# Patient Record
Sex: FEMALE | Race: WHITE | Employment: FULL TIME | URBAN - METROPOLITAN AREA
[De-identification: names, ages, dates, MRNs, and addresses within clinical notes are randomized per-mention and may not be internally consistent; named-entity substitution may affect disease eponyms.]

---

## 2017-02-09 ENCOUNTER — TELEPHONE (OUTPATIENT)
Dept: INTERNAL MEDICINE | Age: 67
End: 2017-02-09

## 2017-02-09 DIAGNOSIS — Z00.00 PHYSICAL EXAM: ICD-10-CM

## 2017-02-09 DIAGNOSIS — I10 ESSENTIAL HYPERTENSION: Primary | ICD-10-CM

## 2017-02-16 LAB
A/G RATIO: 1.8 (ref 1.1–2.2)
ALBUMIN SERPL-MCNC: 4.6 G/DL (ref 3.4–5)
ALP BLD-CCNC: 90 U/L (ref 40–129)
ALT SERPL-CCNC: 16 U/L (ref 10–40)
ANION GAP SERPL CALCULATED.3IONS-SCNC: 15 MMOL/L (ref 3–16)
AST SERPL-CCNC: 15 U/L (ref 15–37)
BASOPHILS ABSOLUTE: 0 K/UL (ref 0–0.2)
BASOPHILS RELATIVE PERCENT: 0.4 %
BILIRUB SERPL-MCNC: 0.4 MG/DL (ref 0–1)
BILIRUBIN URINE: NEGATIVE
BLOOD, URINE: NEGATIVE
BUN BLDV-MCNC: 12 MG/DL (ref 7–20)
CALCIUM SERPL-MCNC: 9.7 MG/DL (ref 8.3–10.6)
CHLORIDE BLD-SCNC: 100 MMOL/L (ref 99–110)
CHOLESTEROL, TOTAL: 174 MG/DL (ref 0–199)
CLARITY: CLEAR
CO2: 27 MMOL/L (ref 21–32)
COLOR: YELLOW
CREAT SERPL-MCNC: 0.6 MG/DL (ref 0.6–1.2)
EOSINOPHILS ABSOLUTE: 0.1 K/UL (ref 0–0.6)
EOSINOPHILS RELATIVE PERCENT: 1.4 %
GFR AFRICAN AMERICAN: >60
GFR NON-AFRICAN AMERICAN: >60
GLOBULIN: 2.6 G/DL
GLUCOSE BLD-MCNC: 89 MG/DL (ref 70–99)
GLUCOSE URINE: NEGATIVE MG/DL
HCT VFR BLD CALC: 44.2 % (ref 36–48)
HDLC SERPL-MCNC: 53 MG/DL (ref 40–60)
HEMOGLOBIN: 14.2 G/DL (ref 12–16)
KETONES, URINE: NEGATIVE MG/DL
LDL CHOLESTEROL CALCULATED: 96 MG/DL
LEUKOCYTE ESTERASE, URINE: NEGATIVE
LYMPHOCYTES ABSOLUTE: 1.7 K/UL (ref 1–5.1)
LYMPHOCYTES RELATIVE PERCENT: 30.2 %
MCH RBC QN AUTO: 28.9 PG (ref 26–34)
MCHC RBC AUTO-ENTMCNC: 32.1 G/DL (ref 31–36)
MCV RBC AUTO: 90.1 FL (ref 80–100)
MICROSCOPIC EXAMINATION: NORMAL
MONOCYTES ABSOLUTE: 0.4 K/UL (ref 0–1.3)
MONOCYTES RELATIVE PERCENT: 7.5 %
NEUTROPHILS ABSOLUTE: 3.5 K/UL (ref 1.7–7.7)
NEUTROPHILS RELATIVE PERCENT: 60.5 %
NITRITE, URINE: NEGATIVE
PDW BLD-RTO: 14.4 % (ref 12.4–15.4)
PH UA: 6.5
PLATELET # BLD: 246 K/UL (ref 135–450)
PMV BLD AUTO: 8.7 FL (ref 5–10.5)
POTASSIUM SERPL-SCNC: 4.4 MMOL/L (ref 3.5–5.1)
PROTEIN UA: NEGATIVE MG/DL
RBC # BLD: 4.9 M/UL (ref 4–5.2)
SODIUM BLD-SCNC: 142 MMOL/L (ref 136–145)
SPECIFIC GRAVITY UA: 1.01
TOTAL PROTEIN: 7.2 G/DL (ref 6.4–8.2)
TRIGL SERPL-MCNC: 124 MG/DL (ref 0–150)
TSH REFLEX FT4: 1.21 UIU/ML (ref 0.27–4.2)
URINE TYPE: NORMAL
UROBILINOGEN, URINE: 0.2 E.U./DL
VITAMIN D 25-HYDROXY: 9.2 NG/ML
VLDLC SERPL CALC-MCNC: 25 MG/DL
WBC # BLD: 5.7 K/UL (ref 4–11)

## 2017-02-20 RX ORDER — VALSARTAN 160 MG/1
TABLET ORAL
Qty: 90 TABLET | Refills: 3 | Status: SHIPPED | OUTPATIENT
Start: 2017-02-20 | End: 2018-02-07 | Stop reason: SDUPTHER

## 2017-02-28 RX ORDER — HYDROCHLOROTHIAZIDE 25 MG/1
TABLET ORAL
Qty: 90 TABLET | Refills: 3 | Status: SHIPPED | OUTPATIENT
Start: 2017-02-28 | End: 2018-02-20 | Stop reason: SDUPTHER

## 2017-03-02 ENCOUNTER — OFFICE VISIT (OUTPATIENT)
Dept: INTERNAL MEDICINE | Age: 67
End: 2017-03-02

## 2017-03-02 VITALS
SYSTOLIC BLOOD PRESSURE: 130 MMHG | WEIGHT: 274 LBS | BODY MASS INDEX: 41.52 KG/M2 | DIASTOLIC BLOOD PRESSURE: 78 MMHG | HEIGHT: 68 IN | HEART RATE: 72 BPM

## 2017-03-02 DIAGNOSIS — R19.5 LOOSE STOOLS: ICD-10-CM

## 2017-03-02 DIAGNOSIS — L98.9 SKIN LESION OF RIGHT LEG: ICD-10-CM

## 2017-03-02 DIAGNOSIS — I10 ESSENTIAL HYPERTENSION: ICD-10-CM

## 2017-03-02 DIAGNOSIS — Z00.00 PREVENTATIVE HEALTH CARE: Primary | ICD-10-CM

## 2017-03-02 DIAGNOSIS — R60.9 EDEMA, UNSPECIFIED TYPE: ICD-10-CM

## 2017-03-02 DIAGNOSIS — Z00.00 PHYSICAL EXAM: ICD-10-CM

## 2017-03-02 DIAGNOSIS — E55.9 VITAMIN D DEFICIENCY: ICD-10-CM

## 2017-03-02 PROCEDURE — 93000 ELECTROCARDIOGRAM COMPLETE: CPT | Performed by: INTERNAL MEDICINE

## 2017-03-02 PROCEDURE — 99397 PER PM REEVAL EST PAT 65+ YR: CPT | Performed by: INTERNAL MEDICINE

## 2017-03-02 ASSESSMENT — ENCOUNTER SYMPTOMS
ALLERGIC/IMMUNOLOGIC NEGATIVE: 1
ANAL BLEEDING: 0
RESPIRATORY NEGATIVE: 1
EYES NEGATIVE: 1
DIARRHEA: 1
BLOOD IN STOOL: 0

## 2017-04-06 ENCOUNTER — OFFICE VISIT (OUTPATIENT)
Dept: DERMATOLOGY | Age: 67
End: 2017-04-06

## 2017-04-06 DIAGNOSIS — D23.71 DERMATOFIBROMA OF LOWER LEG, RIGHT: Primary | ICD-10-CM

## 2017-04-06 PROCEDURE — 99201 PR OFFICE OUTPATIENT NEW 10 MINUTES: CPT | Performed by: DERMATOLOGY

## 2018-01-31 ENCOUNTER — TELEPHONE (OUTPATIENT)
Dept: INTERNAL MEDICINE | Age: 68
End: 2018-01-31

## 2018-01-31 RX ORDER — DIPHENOXYLATE HYDROCHLORIDE AND ATROPINE SULFATE 2.5; .025 MG/1; MG/1
1 TABLET ORAL EVERY 6 HOURS PRN
Qty: 20 TABLET | Refills: 0 | OUTPATIENT
Start: 2018-01-31 | End: 2018-03-02

## 2018-01-31 NOTE — TELEPHONE ENCOUNTER
Patient has been sick with vomiting and diarrhea the vomiting has stop  Pt has taken kaopectate not helping  Please call pt to advise   [facility]

## 2018-02-07 RX ORDER — VALSARTAN 160 MG/1
TABLET ORAL
Qty: 90 TABLET | Refills: 3 | Status: SHIPPED | OUTPATIENT
Start: 2018-02-07 | End: 2018-08-13

## 2018-02-13 ENCOUNTER — TELEPHONE (OUTPATIENT)
Dept: INTERNAL MEDICINE | Age: 68
End: 2018-02-13

## 2018-02-13 RX ORDER — CHOLESTYRAMINE
POWDER (GRAM) MISCELLANEOUS
Qty: 473 G | Refills: 0 | Status: SHIPPED | OUTPATIENT
Start: 2018-02-13 | End: 2018-03-05 | Stop reason: SDUPTHER

## 2018-02-13 NOTE — TELEPHONE ENCOUNTER
Symptoms: diarrhea since yesterday     What medications have you tried:Lomotil  #20   1 q6h rn diarrhea, however pt is currently taking kaopectate diarrhea started yesterday   Stopped diarrhea and d/c taking due to experiencing dizziness and would like to be Rx something else for diarrhea   Pharmacy listed     Appointment offered:yes, request to be Rx something different for new onset of diarrhea   Pt can be reached at [phone]

## 2018-02-19 ENCOUNTER — TELEPHONE (OUTPATIENT)
Dept: INTERNAL MEDICINE | Age: 68
End: 2018-02-19

## 2018-02-19 DIAGNOSIS — R31.29 MICROHEMATURIA: ICD-10-CM

## 2018-02-19 DIAGNOSIS — I10 ESSENTIAL HYPERTENSION: Primary | ICD-10-CM

## 2018-02-19 DIAGNOSIS — Z00.00 PHYSICAL EXAM: ICD-10-CM

## 2018-02-19 DIAGNOSIS — E55.9 VITAMIN D DEFICIENCY: ICD-10-CM

## 2018-02-20 RX ORDER — HYDROCHLOROTHIAZIDE 25 MG/1
TABLET ORAL
Qty: 90 TABLET | Refills: 3 | Status: SHIPPED | OUTPATIENT
Start: 2018-02-20 | End: 2019-01-28 | Stop reason: SDUPTHER

## 2018-02-26 LAB
A/G RATIO: 1.8 (ref 1.1–2.2)
ALBUMIN SERPL-MCNC: 4.5 G/DL (ref 3.4–5)
ALP BLD-CCNC: 81 U/L (ref 40–129)
ALT SERPL-CCNC: 19 U/L (ref 10–40)
ANION GAP SERPL CALCULATED.3IONS-SCNC: 17 MMOL/L (ref 3–16)
AST SERPL-CCNC: 22 U/L (ref 15–37)
BASOPHILS ABSOLUTE: 0 K/UL (ref 0–0.2)
BASOPHILS RELATIVE PERCENT: 0.3 %
BILIRUB SERPL-MCNC: 0.5 MG/DL (ref 0–1)
BILIRUBIN URINE: NEGATIVE
BLOOD, URINE: NEGATIVE
BUN BLDV-MCNC: 8 MG/DL (ref 7–20)
CALCIUM SERPL-MCNC: 9.1 MG/DL (ref 8.3–10.6)
CHLORIDE BLD-SCNC: 95 MMOL/L (ref 99–110)
CHOLESTEROL, TOTAL: 169 MG/DL (ref 0–199)
CLARITY: CLEAR
CO2: 28 MMOL/L (ref 21–32)
COLOR: YELLOW
CREAT SERPL-MCNC: 0.5 MG/DL (ref 0.6–1.2)
EOSINOPHILS ABSOLUTE: 0.1 K/UL (ref 0–0.6)
EOSINOPHILS RELATIVE PERCENT: 1.1 %
EPITHELIAL CELLS, UA: 0 /HPF (ref 0–5)
GFR AFRICAN AMERICAN: >60
GFR NON-AFRICAN AMERICAN: >60
GLOBULIN: 2.5 G/DL
GLUCOSE BLD-MCNC: 96 MG/DL (ref 70–99)
GLUCOSE URINE: NEGATIVE MG/DL
HCT VFR BLD CALC: 42.8 % (ref 36–48)
HDLC SERPL-MCNC: 48 MG/DL (ref 40–60)
HEMOGLOBIN: 14.4 G/DL (ref 12–16)
HYALINE CASTS: 0 /LPF (ref 0–8)
KETONES, URINE: NEGATIVE MG/DL
LDL CHOLESTEROL CALCULATED: 91 MG/DL
LEUKOCYTE ESTERASE, URINE: ABNORMAL
LYMPHOCYTES ABSOLUTE: 1.6 K/UL (ref 1–5.1)
LYMPHOCYTES RELATIVE PERCENT: 27.1 %
MCH RBC QN AUTO: 29.6 PG (ref 26–34)
MCHC RBC AUTO-ENTMCNC: 33.6 G/DL (ref 31–36)
MCV RBC AUTO: 88.2 FL (ref 80–100)
MICROSCOPIC EXAMINATION: YES
MONOCYTES ABSOLUTE: 0.4 K/UL (ref 0–1.3)
MONOCYTES RELATIVE PERCENT: 6.1 %
NEUTROPHILS ABSOLUTE: 3.9 K/UL (ref 1.7–7.7)
NEUTROPHILS RELATIVE PERCENT: 65.4 %
NITRITE, URINE: NEGATIVE
PDW BLD-RTO: 13.9 % (ref 12.4–15.4)
PH UA: 6.5
PLATELET # BLD: 282 K/UL (ref 135–450)
PMV BLD AUTO: 8.4 FL (ref 5–10.5)
POTASSIUM SERPL-SCNC: 3.7 MMOL/L (ref 3.5–5.1)
PROTEIN UA: NEGATIVE MG/DL
RBC # BLD: 4.85 M/UL (ref 4–5.2)
RBC UA: 2 /HPF (ref 0–4)
SODIUM BLD-SCNC: 140 MMOL/L (ref 136–145)
SPECIFIC GRAVITY UA: 1
TOTAL PROTEIN: 7 G/DL (ref 6.4–8.2)
TRIGL SERPL-MCNC: 149 MG/DL (ref 0–150)
TSH REFLEX FT4: 0.88 UIU/ML (ref 0.27–4.2)
URINE TYPE: ABNORMAL
UROBILINOGEN, URINE: 0.2 E.U./DL
VITAMIN D 25-HYDROXY: 5.9 NG/ML
VLDLC SERPL CALC-MCNC: 30 MG/DL
WBC # BLD: 5.9 K/UL (ref 4–11)
WBC UA: 1 /HPF (ref 0–5)

## 2018-03-05 ENCOUNTER — OFFICE VISIT (OUTPATIENT)
Dept: INTERNAL MEDICINE | Age: 68
End: 2018-03-05

## 2018-03-05 VITALS
HEART RATE: 67 BPM | SYSTOLIC BLOOD PRESSURE: 120 MMHG | HEIGHT: 68 IN | WEIGHT: 274 LBS | DIASTOLIC BLOOD PRESSURE: 78 MMHG | OXYGEN SATURATION: 96 % | BODY MASS INDEX: 41.52 KG/M2

## 2018-03-05 DIAGNOSIS — E55.9 VITAMIN D DEFICIENCY: ICD-10-CM

## 2018-03-05 DIAGNOSIS — I10 ESSENTIAL HYPERTENSION: Primary | ICD-10-CM

## 2018-03-05 DIAGNOSIS — R31.29 MICROHEMATURIA: ICD-10-CM

## 2018-03-05 DIAGNOSIS — Z00.00 PREVENTATIVE HEALTH CARE: ICD-10-CM

## 2018-03-05 DIAGNOSIS — R19.5 LOOSE STOOLS: ICD-10-CM

## 2018-03-05 PROCEDURE — 93000 ELECTROCARDIOGRAM COMPLETE: CPT | Performed by: INTERNAL MEDICINE

## 2018-03-05 PROCEDURE — 99397 PER PM REEVAL EST PAT 65+ YR: CPT | Performed by: INTERNAL MEDICINE

## 2018-03-05 RX ORDER — CHOLESTYRAMINE
POWDER (GRAM) MISCELLANEOUS
Qty: 473 G | Refills: 5 | Status: SHIPPED | OUTPATIENT
Start: 2018-03-05 | End: 2018-03-05 | Stop reason: SDUPTHER

## 2018-03-05 RX ORDER — CHOLESTYRAMINE
POWDER (GRAM) MISCELLANEOUS
Qty: 473 G | Refills: 5 | Status: SHIPPED | OUTPATIENT
Start: 2018-03-05 | End: 2019-02-14

## 2018-03-05 ASSESSMENT — PATIENT HEALTH QUESTIONNAIRE - PHQ9
1. LITTLE INTEREST OR PLEASURE IN DOING THINGS: 0
SUM OF ALL RESPONSES TO PHQ9 QUESTIONS 1 & 2: 0
SUM OF ALL RESPONSES TO PHQ QUESTIONS 1-9: 0
2. FEELING DOWN, DEPRESSED OR HOPELESS: 0

## 2018-03-05 ASSESSMENT — ENCOUNTER SYMPTOMS
VOMITING: 0
NAUSEA: 0
BLOOD IN STOOL: 0
EYES NEGATIVE: 1
ABDOMINAL PAIN: 0
RESPIRATORY NEGATIVE: 1
ALLERGIC/IMMUNOLOGIC NEGATIVE: 1
DIARRHEA: 1

## 2018-04-11 PROBLEM — Z00.00 PREVENTATIVE HEALTH CARE: Status: RESOLVED | Noted: 2018-03-05 | Resolved: 2018-04-11

## 2018-08-13 ENCOUNTER — TELEPHONE (OUTPATIENT)
Dept: INTERNAL MEDICINE | Age: 68
End: 2018-08-13

## 2018-08-13 RX ORDER — LOSARTAN POTASSIUM 100 MG/1
100 TABLET ORAL DAILY
Qty: 90 TABLET | Refills: 3 | Status: SHIPPED | OUTPATIENT
Start: 2018-08-13 | End: 2019-07-23 | Stop reason: SDUPTHER

## 2018-09-04 ENCOUNTER — TELEPHONE (OUTPATIENT)
Dept: INTERNAL MEDICINE | Age: 68
End: 2018-09-04

## 2018-09-04 RX ORDER — TOBRAMYCIN AND DEXAMETHASONE 3; 1 MG/ML; MG/ML
SUSPENSION/ DROPS OPHTHALMIC
Qty: 5 ML | Refills: 0 | Status: SHIPPED | OUTPATIENT
Start: 2018-09-04 | End: 2018-11-19 | Stop reason: SDUPTHER

## 2018-11-19 ENCOUNTER — TELEPHONE (OUTPATIENT)
Dept: INTERNAL MEDICINE CLINIC | Age: 68
End: 2018-11-19

## 2018-11-19 RX ORDER — TOBRAMYCIN AND DEXAMETHASONE 3; 1 MG/ML; MG/ML
SUSPENSION/ DROPS OPHTHALMIC
Qty: 5 ML | Refills: 0 | Status: SHIPPED | OUTPATIENT
Start: 2018-11-19 | End: 2019-02-14

## 2018-11-19 NOTE — TELEPHONE ENCOUNTER
I refilled the last one I could find. If not improving within a couple of days she should see us or her eye doctor.

## 2019-01-07 ENCOUNTER — TELEPHONE (OUTPATIENT)
Dept: INTERNAL MEDICINE CLINIC | Age: 69
End: 2019-01-07

## 2019-01-28 RX ORDER — HYDROCHLOROTHIAZIDE 25 MG/1
TABLET ORAL
Qty: 90 TABLET | Refills: 3 | Status: SHIPPED | OUTPATIENT
Start: 2019-01-28 | End: 2019-03-06

## 2019-01-31 RX ORDER — CHOLESTYRAMINE 4 G/9G
POWDER, FOR SUSPENSION ORAL
Qty: 378 G | Refills: 1 | Status: SHIPPED | OUTPATIENT
Start: 2019-01-31 | End: 2019-02-18 | Stop reason: SDUPTHER

## 2019-02-14 ENCOUNTER — TELEPHONE (OUTPATIENT)
Dept: INTERNAL MEDICINE CLINIC | Age: 69
End: 2019-02-14

## 2019-02-14 DIAGNOSIS — I10 ESSENTIAL HYPERTENSION: ICD-10-CM

## 2019-02-14 DIAGNOSIS — E55.9 VITAMIN D DEFICIENCY: ICD-10-CM

## 2019-02-14 DIAGNOSIS — R31.29 MICROHEMATURIA: ICD-10-CM

## 2019-02-14 DIAGNOSIS — Z00.00 WELL ADULT EXAM: Primary | ICD-10-CM

## 2019-02-18 RX ORDER — CHOLESTYRAMINE 4 G/9G
POWDER, FOR SUSPENSION ORAL
Qty: 1134 G | Refills: 1 | Status: SHIPPED | OUTPATIENT
Start: 2019-02-18 | End: 2019-09-03 | Stop reason: SDUPTHER

## 2019-02-26 LAB
A/G RATIO: 2 (ref 1.1–2.2)
ALBUMIN SERPL-MCNC: 4.7 G/DL (ref 3.4–5)
ALP BLD-CCNC: 96 U/L (ref 40–129)
ALT SERPL-CCNC: 16 U/L (ref 10–40)
ANION GAP SERPL CALCULATED.3IONS-SCNC: 16 MMOL/L (ref 3–16)
AST SERPL-CCNC: 15 U/L (ref 15–37)
BASOPHILS ABSOLUTE: 0 K/UL (ref 0–0.2)
BASOPHILS RELATIVE PERCENT: 0.3 %
BILIRUB SERPL-MCNC: 0.5 MG/DL (ref 0–1)
BUN BLDV-MCNC: 8 MG/DL (ref 7–20)
CALCIUM SERPL-MCNC: 9.3 MG/DL (ref 8.3–10.6)
CHLORIDE BLD-SCNC: 99 MMOL/L (ref 99–110)
CHOLESTEROL, TOTAL: 159 MG/DL (ref 0–199)
CO2: 28 MMOL/L (ref 21–32)
CREAT SERPL-MCNC: 0.6 MG/DL (ref 0.6–1.2)
EOSINOPHILS ABSOLUTE: 0 K/UL (ref 0–0.6)
EOSINOPHILS RELATIVE PERCENT: 1 %
GFR AFRICAN AMERICAN: >60
GFR NON-AFRICAN AMERICAN: >60
GLOBULIN: 2.3 G/DL
GLUCOSE BLD-MCNC: 97 MG/DL (ref 70–99)
HCT VFR BLD CALC: 44.1 % (ref 36–48)
HDLC SERPL-MCNC: 45 MG/DL (ref 40–60)
HEMOGLOBIN: 14.3 G/DL (ref 12–16)
HEPATITIS C ANTIBODY INTERPRETATION: NORMAL
LDL CHOLESTEROL CALCULATED: 90 MG/DL
LYMPHOCYTES ABSOLUTE: 1.3 K/UL (ref 1–5.1)
LYMPHOCYTES RELATIVE PERCENT: 27 %
MCH RBC QN AUTO: 28.7 PG (ref 26–34)
MCHC RBC AUTO-ENTMCNC: 32.4 G/DL (ref 31–36)
MCV RBC AUTO: 88.6 FL (ref 80–100)
MONOCYTES ABSOLUTE: 0.3 K/UL (ref 0–1.3)
MONOCYTES RELATIVE PERCENT: 7 %
NEUTROPHILS ABSOLUTE: 3 K/UL (ref 1.7–7.7)
NEUTROPHILS RELATIVE PERCENT: 64.7 %
PDW BLD-RTO: 14.4 % (ref 12.4–15.4)
PLATELET # BLD: 274 K/UL (ref 135–450)
PMV BLD AUTO: 8.1 FL (ref 5–10.5)
POTASSIUM SERPL-SCNC: 3.9 MMOL/L (ref 3.5–5.1)
RBC # BLD: 4.98 M/UL (ref 4–5.2)
SODIUM BLD-SCNC: 143 MMOL/L (ref 136–145)
TOTAL PROTEIN: 7 G/DL (ref 6.4–8.2)
TRIGL SERPL-MCNC: 119 MG/DL (ref 0–150)
TSH SERPL DL<=0.05 MIU/L-ACNC: 1.03 UIU/ML (ref 0.27–4.2)
VITAMIN D 25-HYDROXY: <5 NG/ML
VLDLC SERPL CALC-MCNC: 24 MG/DL
WBC # BLD: 4.7 K/UL (ref 4–11)

## 2019-02-27 LAB
ESTIMATED AVERAGE GLUCOSE: 119.8 MG/DL
HBA1C MFR BLD: 5.8 %

## 2019-03-06 ENCOUNTER — HOSPITAL ENCOUNTER (OUTPATIENT)
Age: 69
End: 2019-03-06
Payer: COMMERCIAL

## 2019-03-06 ENCOUNTER — OFFICE VISIT (OUTPATIENT)
Dept: INTERNAL MEDICINE CLINIC | Age: 69
End: 2019-03-06
Payer: COMMERCIAL

## 2019-03-06 ENCOUNTER — HOSPITAL ENCOUNTER (OUTPATIENT)
Dept: GENERAL RADIOLOGY | Age: 69
Discharge: HOME | End: 2019-03-06
Payer: COMMERCIAL

## 2019-03-06 VITALS
BODY MASS INDEX: 41.98 KG/M2 | HEIGHT: 68 IN | SYSTOLIC BLOOD PRESSURE: 130 MMHG | HEART RATE: 72 BPM | TEMPERATURE: 97.8 F | OXYGEN SATURATION: 96 % | DIASTOLIC BLOOD PRESSURE: 80 MMHG | WEIGHT: 277 LBS

## 2019-03-06 DIAGNOSIS — M79.675 GREAT TOE PAIN, LEFT: ICD-10-CM

## 2019-03-06 DIAGNOSIS — I10 ESSENTIAL HYPERTENSION: ICD-10-CM

## 2019-03-06 DIAGNOSIS — E55.9 VITAMIN D DEFICIENCY: ICD-10-CM

## 2019-03-06 DIAGNOSIS — Z23 NEED FOR PNEUMOCOCCAL VACCINATION: ICD-10-CM

## 2019-03-06 DIAGNOSIS — Z00.00 PREVENTATIVE HEALTH CARE: Primary | ICD-10-CM

## 2019-03-06 DIAGNOSIS — R60.9 EDEMA, UNSPECIFIED TYPE: ICD-10-CM

## 2019-03-06 PROCEDURE — 93000 ELECTROCARDIOGRAM COMPLETE: CPT | Performed by: INTERNAL MEDICINE

## 2019-03-06 PROCEDURE — 99213 OFFICE O/P EST LOW 20 MIN: CPT | Performed by: INTERNAL MEDICINE

## 2019-03-06 PROCEDURE — 73660 X-RAY EXAM OF TOE(S): CPT

## 2019-03-06 PROCEDURE — 99397 PER PM REEVAL EST PAT 65+ YR: CPT | Performed by: INTERNAL MEDICINE

## 2019-03-06 PROCEDURE — 90732 PPSV23 VACC 2 YRS+ SUBQ/IM: CPT | Performed by: INTERNAL MEDICINE

## 2019-03-06 PROCEDURE — 90471 IMMUNIZATION ADMIN: CPT | Performed by: INTERNAL MEDICINE

## 2019-03-06 RX ORDER — TORSEMIDE 20 MG/1
20 TABLET ORAL DAILY
Qty: 30 TABLET | Refills: 5 | Status: SHIPPED | OUTPATIENT
Start: 2019-03-06 | End: 2019-07-02 | Stop reason: SDUPTHER

## 2019-03-06 RX ORDER — TORSEMIDE 20 MG/1
20 TABLET ORAL DAILY
Qty: 30 TABLET | Refills: 5 | Status: SHIPPED | OUTPATIENT
Start: 2019-03-06 | End: 2019-03-06 | Stop reason: SDUPTHER

## 2019-03-06 ASSESSMENT — PATIENT HEALTH QUESTIONNAIRE - PHQ9
1. LITTLE INTEREST OR PLEASURE IN DOING THINGS: 0
2. FEELING DOWN, DEPRESSED OR HOPELESS: 0
SUM OF ALL RESPONSES TO PHQ QUESTIONS 1-9: 0
SUM OF ALL RESPONSES TO PHQ QUESTIONS 1-9: 0
SUM OF ALL RESPONSES TO PHQ9 QUESTIONS 1 & 2: 0

## 2019-03-06 ASSESSMENT — ENCOUNTER SYMPTOMS
GASTROINTESTINAL NEGATIVE: 1
RESPIRATORY NEGATIVE: 1

## 2019-03-11 ENCOUNTER — TELEPHONE (OUTPATIENT)
Dept: INTERNAL MEDICINE CLINIC | Age: 69
End: 2019-03-11

## 2019-04-05 PROBLEM — Z00.00 PREVENTATIVE HEALTH CARE: Status: RESOLVED | Noted: 2018-03-05 | Resolved: 2019-04-05

## 2019-07-02 RX ORDER — TORSEMIDE 20 MG/1
TABLET ORAL
Qty: 90 TABLET | Refills: 1 | Status: SHIPPED | OUTPATIENT
Start: 2019-07-02 | End: 2019-12-23

## 2019-07-23 RX ORDER — LOSARTAN POTASSIUM 100 MG/1
TABLET ORAL
Qty: 90 TABLET | Refills: 3 | Status: SHIPPED | OUTPATIENT
Start: 2019-07-23 | End: 2020-07-29

## 2019-08-05 ENCOUNTER — OFFICE VISIT (OUTPATIENT)
Dept: INTERNAL MEDICINE CLINIC | Age: 69
End: 2019-08-05
Payer: COMMERCIAL

## 2019-08-05 VITALS
HEIGHT: 68 IN | OXYGEN SATURATION: 97 % | WEIGHT: 277 LBS | BODY MASS INDEX: 41.98 KG/M2 | SYSTOLIC BLOOD PRESSURE: 148 MMHG | HEART RATE: 65 BPM | DIASTOLIC BLOOD PRESSURE: 82 MMHG

## 2019-08-05 DIAGNOSIS — R06.02 SOB (SHORTNESS OF BREATH): Primary | ICD-10-CM

## 2019-08-05 DIAGNOSIS — R13.19 ESOPHAGEAL DYSPHAGIA: ICD-10-CM

## 2019-08-05 PROCEDURE — 93000 ELECTROCARDIOGRAM COMPLETE: CPT | Performed by: INTERNAL MEDICINE

## 2019-08-05 PROCEDURE — 99213 OFFICE O/P EST LOW 20 MIN: CPT | Performed by: INTERNAL MEDICINE

## 2019-08-05 ASSESSMENT — PATIENT HEALTH QUESTIONNAIRE - PHQ9
1. LITTLE INTEREST OR PLEASURE IN DOING THINGS: 0
2. FEELING DOWN, DEPRESSED OR HOPELESS: 0
SUM OF ALL RESPONSES TO PHQ9 QUESTIONS 1 & 2: 0
SUM OF ALL RESPONSES TO PHQ QUESTIONS 1-9: 0
SUM OF ALL RESPONSES TO PHQ QUESTIONS 1-9: 0

## 2019-08-05 ASSESSMENT — ENCOUNTER SYMPTOMS
SHORTNESS OF BREATH: 0
TROUBLE SWALLOWING: 0

## 2019-08-05 NOTE — PROGRESS NOTES
SUBJECTIVE:  Patient ID: Chris Garcia is an 71 y.o. female. HPI: Patient here today for the f/u of chronic problems-- see Problem List and associated comments. New issues or complaints include (alsosee Assessment for more details): For the last couple weeks she gets lower mid chest discomfort only after she eats. Sensation of fullness. It can radiate up her esophagus or right or left below her rib cage. No associated diaphoresis or shortness of breath. No lightheadedness. If she does not eat and she gets no symptoms. In the past she has been taking antacids but not recently. She denies any acid reflux symptoms. No nocturnal symptoms. No exertional symptoms. She does get a little short of breath when she climbs multiple sets of steps but she recovers quickly. She did have an EGD in the past, probably 20 years ago or greater. Review of Systems   Constitutional: Negative for diaphoresis. HENT: Negative for trouble swallowing. Respiratory: Negative for shortness of breath. Cardiovascular: Positive for chest pain. Negative for palpitations. Neurological: Negative for light-headedness. OBJECTIVE:    BP (!) 148/82 (Site: Right Upper Arm, Position: Sitting, Cuff Size: Large Adult)   Pulse 65   Ht 5' 8\" (1.727 m)   Wt 277 lb (125.6 kg)   SpO2 97%   BMI 42.12 kg/m²      Physical Exam   Constitutional: She appears well-developed and well-nourished. No distress. Overweight   Eyes: No scleral icterus. Neck: No tracheal deviation present. Cardiovascular: Normal rate, regular rhythm and normal heart sounds. Exam reveals no gallop. No murmur heard. Pulmonary/Chest: Effort normal and breath sounds normal. No stridor. No respiratory distress. She has no wheezes. Abdominal: Soft. Bowel sounds are normal. She exhibits no distension, no abdominal bruit and no pulsatile midline mass. There is tenderness in the epigastric area. There is no rigidity and no guarding.    Skin: She is not diaphoretic. ASSESSMENT:       Encounter Diagnoses   Name Primary?  SOB (shortness of breath) Yes    Esophageal dysphagia        Esophageal dysphagia  Suspect by location and symptoms that this is all esophageal or hiatal hernia. Gave samples Nexium 20 mg to take daily. Refer to GI for probable EGD. If symptoms change in nature or quality then she is to call. EKG normal.        PLAN:See ASSESSMENT for evaluation & PLAN     Orders Placed This Encounter   Procedures    EKG 12 Lead     Order Specific Question:   Reason for Exam?     Answer: Other       PSH, PMH, SH and FH reviewed and noted. Recent and past labs, tests and consultsalso reviewed. Recent or new meds also reviewed.

## 2019-08-06 ENCOUNTER — TELEPHONE (OUTPATIENT)
Dept: INTERNAL MEDICINE CLINIC | Age: 69
End: 2019-08-06

## 2019-08-06 DIAGNOSIS — R13.19 ESOPHAGEAL DYSPHAGIA: Primary | ICD-10-CM

## 2019-08-09 ENCOUNTER — TELEPHONE (OUTPATIENT)
Dept: INTERNAL MEDICINE CLINIC | Age: 69
End: 2019-08-09

## 2019-09-03 RX ORDER — CHOLESTYRAMINE 4 G/9G
POWDER, FOR SUSPENSION ORAL
Qty: 1134 G | Refills: 1 | Status: SHIPPED | OUTPATIENT
Start: 2019-09-03 | End: 2020-06-29

## 2019-12-23 RX ORDER — TORSEMIDE 20 MG/1
TABLET ORAL
Qty: 90 TABLET | Refills: 1 | Status: SHIPPED | OUTPATIENT
Start: 2019-12-23 | End: 2020-06-22

## 2020-01-31 ENCOUNTER — OFFICE VISIT (OUTPATIENT)
Dept: INTERNAL MEDICINE CLINIC | Age: 70
End: 2020-01-31
Payer: COMMERCIAL

## 2020-01-31 VITALS
BODY MASS INDEX: 39.4 KG/M2 | DIASTOLIC BLOOD PRESSURE: 76 MMHG | WEIGHT: 260 LBS | HEIGHT: 68 IN | SYSTOLIC BLOOD PRESSURE: 122 MMHG

## 2020-01-31 PROBLEM — M17.11 DEGENERATIVE ARTHRITIS OF RIGHT KNEE: Status: ACTIVE | Noted: 2020-01-31

## 2020-01-31 PROBLEM — M23.306 DEGENERATION OF MENISCUS OF RIGHT KNEE: Status: ACTIVE | Noted: 2020-01-31

## 2020-01-31 PROCEDURE — 99213 OFFICE O/P EST LOW 20 MIN: CPT | Performed by: INTERNAL MEDICINE

## 2020-01-31 RX ORDER — OMEPRAZOLE 40 MG/1
CAPSULE, DELAYED RELEASE ORAL
COMMUNITY
Start: 2019-08-01

## 2020-01-31 NOTE — ASSESSMENT & PLAN NOTE
Probable diagnosis but she could have meniscus tear. She did have meniscus injury to her left knee previously. No benefit from steroid injection. X-rays done at [facility]. Recommend MRI and possible orthopedic follow-up.

## 2020-02-10 ENCOUNTER — TELEPHONE (OUTPATIENT)
Dept: INTERNAL MEDICINE CLINIC | Age: 70
End: 2020-02-10

## 2020-02-18 ENCOUNTER — TELEPHONE (OUTPATIENT)
Dept: INTERNAL MEDICINE CLINIC | Age: 70
End: 2020-02-18

## 2020-02-21 LAB
A/G RATIO: 1.8 (ref 1.1–2.2)
ALBUMIN SERPL-MCNC: 4.6 G/DL (ref 3.4–5)
ALP BLD-CCNC: 132 U/L (ref 40–129)
ALT SERPL-CCNC: 12 U/L (ref 10–40)
ANION GAP SERPL CALCULATED.3IONS-SCNC: 16 MMOL/L (ref 3–16)
AST SERPL-CCNC: 14 U/L (ref 15–37)
BASOPHILS ABSOLUTE: 0 K/UL (ref 0–0.2)
BASOPHILS RELATIVE PERCENT: 0.5 %
BILIRUB SERPL-MCNC: 0.5 MG/DL (ref 0–1)
BILIRUBIN URINE: NEGATIVE
BLOOD, URINE: NEGATIVE
BUN BLDV-MCNC: 10 MG/DL (ref 7–20)
CALCIUM SERPL-MCNC: 9.4 MG/DL (ref 8.3–10.6)
CHLORIDE BLD-SCNC: 103 MMOL/L (ref 99–110)
CHOLESTEROL, TOTAL: 157 MG/DL (ref 0–199)
CLARITY: CLEAR
CO2: 25 MMOL/L (ref 21–32)
COLOR: YELLOW
CREAT SERPL-MCNC: 0.5 MG/DL (ref 0.6–1.2)
EOSINOPHILS ABSOLUTE: 0.2 K/UL (ref 0–0.6)
EOSINOPHILS RELATIVE PERCENT: 3 %
EPITHELIAL CELLS, UA: 1 /HPF (ref 0–5)
GFR AFRICAN AMERICAN: >60
GFR NON-AFRICAN AMERICAN: >60
GLOBULIN: 2.6 G/DL
GLUCOSE BLD-MCNC: 87 MG/DL (ref 70–99)
GLUCOSE URINE: NEGATIVE MG/DL
HCT VFR BLD CALC: 43.5 % (ref 36–48)
HDLC SERPL-MCNC: 51 MG/DL (ref 40–60)
HEMOGLOBIN: 14.1 G/DL (ref 12–16)
HYALINE CASTS: 1 /LPF (ref 0–8)
KETONES, URINE: NEGATIVE MG/DL
LDL CHOLESTEROL CALCULATED: 86 MG/DL
LEUKOCYTE ESTERASE, URINE: ABNORMAL
LYMPHOCYTES ABSOLUTE: 1.6 K/UL (ref 1–5.1)
LYMPHOCYTES RELATIVE PERCENT: 26.7 %
MCH RBC QN AUTO: 27.8 PG (ref 26–34)
MCHC RBC AUTO-ENTMCNC: 32.5 G/DL (ref 31–36)
MCV RBC AUTO: 85.6 FL (ref 80–100)
MICROSCOPIC EXAMINATION: YES
MONOCYTES ABSOLUTE: 0.4 K/UL (ref 0–1.3)
MONOCYTES RELATIVE PERCENT: 6.8 %
NEUTROPHILS ABSOLUTE: 3.8 K/UL (ref 1.7–7.7)
NEUTROPHILS RELATIVE PERCENT: 63 %
NITRITE, URINE: NEGATIVE
PDW BLD-RTO: 15 % (ref 12.4–15.4)
PH UA: 6 (ref 5–8)
PLATELET # BLD: 263 K/UL (ref 135–450)
PMV BLD AUTO: 8.8 FL (ref 5–10.5)
POTASSIUM SERPL-SCNC: 3.8 MMOL/L (ref 3.5–5.1)
PROTEIN UA: NEGATIVE MG/DL
RBC # BLD: 5.08 M/UL (ref 4–5.2)
RBC UA: 2 /HPF (ref 0–4)
SODIUM BLD-SCNC: 144 MMOL/L (ref 136–145)
SPECIFIC GRAVITY UA: 1.01 (ref 1–1.03)
TOTAL PROTEIN: 7.2 G/DL (ref 6.4–8.2)
TRIGL SERPL-MCNC: 102 MG/DL (ref 0–150)
TSH REFLEX FT4: 1.13 UIU/ML (ref 0.27–4.2)
URINE TYPE: ABNORMAL
UROBILINOGEN, URINE: 0.2 E.U./DL
VITAMIN D 25-HYDROXY: 22.8 NG/ML
VLDLC SERPL CALC-MCNC: 20 MG/DL
WBC # BLD: 6 K/UL (ref 4–11)
WBC UA: 4 /HPF (ref 0–5)

## 2020-03-06 ENCOUNTER — OFFICE VISIT (OUTPATIENT)
Dept: INTERNAL MEDICINE CLINIC | Age: 70
End: 2020-03-06
Payer: COMMERCIAL

## 2020-03-06 VITALS
BODY MASS INDEX: 38.34 KG/M2 | SYSTOLIC BLOOD PRESSURE: 138 MMHG | DIASTOLIC BLOOD PRESSURE: 88 MMHG | WEIGHT: 253 LBS | HEIGHT: 68 IN | HEART RATE: 70 BPM | OXYGEN SATURATION: 98 %

## 2020-03-06 PROBLEM — R51.9 FRONTAL HEADACHE: Status: ACTIVE | Noted: 2020-03-06

## 2020-03-06 PROBLEM — M79.675 GREAT TOE PAIN, LEFT: Status: RESOLVED | Noted: 2019-03-06 | Resolved: 2020-03-06

## 2020-03-06 PROCEDURE — 99397 PER PM REEVAL EST PAT 65+ YR: CPT | Performed by: INTERNAL MEDICINE

## 2020-03-06 PROCEDURE — 99213 OFFICE O/P EST LOW 20 MIN: CPT | Performed by: INTERNAL MEDICINE

## 2020-03-06 RX ORDER — AMOXICILLIN AND CLAVULANATE POTASSIUM 875; 125 MG/1; MG/1
1 TABLET, FILM COATED ORAL 2 TIMES DAILY
Qty: 14 TABLET | Refills: 0 | Status: SHIPPED | OUTPATIENT
Start: 2020-03-06 | End: 2020-03-10

## 2020-03-06 RX ORDER — DICLOFENAC SODIUM 75 MG/1
TABLET, DELAYED RELEASE ORAL
COMMUNITY
Start: 2020-02-24 | End: 2020-05-01 | Stop reason: ALTCHOICE

## 2020-03-06 ASSESSMENT — ENCOUNTER SYMPTOMS
GASTROINTESTINAL NEGATIVE: 1
TROUBLE SWALLOWING: 0
RESPIRATORY NEGATIVE: 1

## 2020-03-06 ASSESSMENT — PATIENT HEALTH QUESTIONNAIRE - PHQ9
SUM OF ALL RESPONSES TO PHQ QUESTIONS 1-9: 0
1. LITTLE INTEREST OR PLEASURE IN DOING THINGS: 0
2. FEELING DOWN, DEPRESSED OR HOPELESS: 0
SUM OF ALL RESPONSES TO PHQ QUESTIONS 1-9: 0
SUM OF ALL RESPONSES TO PHQ9 QUESTIONS 1 & 2: 0

## 2020-03-06 NOTE — PROGRESS NOTES
SUBJECTIVE:  Patient ID: Deepa Harden is an 71 y.o. female. HPI: Patient here today for the f/u of chronic problems-- see Problem List and associated comments. New issues or complaints include (alsosee Assessment for more details): Here for general checkup. Labs reviewed. Her biggest problem right now is her right knee. She is seen orthopedics and received a steroid injection again. She also has been placed in a brace and placed on diclofenac. So far the shot is provided some relief but not substantial.  She will be following up with orthopedics in the future. Arthroscopic surgery and possible TKR is in her future. She also has a headache that she wakes up with in the morning. It is right above her right eye. It usually goes away by noon with the use of Tylenol. There are no other neurologic symptoms. This is been going on for approximately 1/2 weeks. She denies any new cardiovascular pulmonary issues. She is still working full-time. Review of Systems   Constitutional: Negative. HENT: Negative. Negative for trouble swallowing. Eyes: Negative for visual disturbance. Routine ophthalmology [id number] glaucoma   Respiratory: Negative. Cardiovascular: Negative. Gastrointestinal: Negative. Genitourinary: Negative. Musculoskeletal: Positive for arthralgias. Skin: Negative. Neurological: Positive for headaches. Hematological: Negative. Psychiatric/Behavioral: Negative. OBJECTIVE:    /88 (Site: Left Upper Arm, Position: Sitting, Cuff Size: Large Adult)   Pulse 70   Ht 5' 8\" (1.727 m)   Wt 253 lb (114.8 kg)   SpO2 98%   BMI 38.47 kg/m²      Physical Exam  Constitutional:       General: She is not in acute distress. Appearance: She is well-developed. She is not diaphoretic. Comments: overweight    HENT:      Head: Normocephalic and atraumatic.       Comments: Tender over right frontal sinus area     Right Ear: External ear normal.

## 2020-04-05 PROBLEM — Z00.00 PREVENTATIVE HEALTH CARE: Status: RESOLVED | Noted: 2018-03-05 | Resolved: 2020-04-05

## 2020-05-22 ENCOUNTER — TELEPHONE (OUTPATIENT)
Dept: INTERNAL MEDICINE CLINIC | Age: 70
End: 2020-05-22

## 2020-06-22 RX ORDER — TORSEMIDE 20 MG/1
TABLET ORAL
Qty: 90 TABLET | Refills: 1 | Status: SHIPPED | OUTPATIENT
Start: 2020-06-22 | End: 2020-12-14 | Stop reason: SDUPTHER

## 2020-06-29 RX ORDER — CHOLESTYRAMINE 4 G/9G
POWDER, FOR SUSPENSION ORAL
Qty: 1134 G | Refills: 1 | Status: SHIPPED | OUTPATIENT
Start: 2020-06-29 | End: 2021-07-15

## 2020-07-29 RX ORDER — LOSARTAN POTASSIUM 100 MG/1
TABLET ORAL
Qty: 90 TABLET | Refills: 3 | Status: SHIPPED | OUTPATIENT
Start: 2020-07-29 | End: 2021-07-06

## 2020-11-03 ENCOUNTER — OFFICE VISIT (OUTPATIENT)
Dept: INTERNAL MEDICINE CLINIC | Age: 70
End: 2020-11-03
Payer: COMMERCIAL

## 2020-11-03 VITALS
SYSTOLIC BLOOD PRESSURE: 136 MMHG | BODY MASS INDEX: 42.31 KG/M2 | WEIGHT: 279.2 LBS | HEART RATE: 90 BPM | DIASTOLIC BLOOD PRESSURE: 80 MMHG | TEMPERATURE: 97.5 F | OXYGEN SATURATION: 98 % | RESPIRATION RATE: 16 BRPM | HEIGHT: 68 IN

## 2020-11-03 PROCEDURE — 99213 OFFICE O/P EST LOW 20 MIN: CPT | Performed by: INTERNAL MEDICINE

## 2020-11-03 RX ORDER — CELECOXIB 200 MG/1
200 CAPSULE ORAL DAILY
Qty: 30 CAPSULE | Refills: 3 | Status: SHIPPED | OUTPATIENT
Start: 2020-11-03 | End: 2020-12-14 | Stop reason: ALTCHOICE

## 2020-11-03 ASSESSMENT — ENCOUNTER SYMPTOMS
EYE REDNESS: 0
CHEST TIGHTNESS: 0
WHEEZING: 0
SHORTNESS OF BREATH: 0
NAUSEA: 0
BACK PAIN: 0
ABDOMINAL PAIN: 0
COUGH: 0

## 2020-11-03 NOTE — PROGRESS NOTES
Subjective:      Patient ID: Froilan Samuel is a 79 y.o. female    Chief Complaint   Patient presents with    Back Pain    Other     both legs       Knee Pain    The pain is present in the right knee. The quality of the pain is described as aching. The pain is at a severity of 4/10. The pain is moderate. The pain has been constant since onset. Associated symptoms include a loss of motion. The symptoms are aggravated by weight bearing. She has tried rest and non-weight bearing (Excedrin) for the symptoms. The treatment provided mild relief. Leg Pain    The pain is present in the left thigh, right thigh, right leg and left leg. The quality of the pain is described as aching. The pain is at a severity of 4/10. The pain is moderate. The pain has been constant since onset. Associated symptoms include a loss of motion. The symptoms are aggravated by weight bearing and movement. She has tried non-weight bearing and rest for the symptoms. The treatment provided mild relief. Current Outpatient Medications on File Prior to Visit   Medication Sig Dispense Refill    losartan (COZAAR) 100 MG tablet TAKE 1 TABLET BY MOUTH EVERY DAY 90 tablet 3    cholestyramine (QUESTRAN) 4 GM/DOSE powder USE ONE SCOOP IN WATER 2 TIMES DAILY 1134 g 1    torsemide (DEMADEX) 20 MG tablet TAKE 1 TABLET BY MOUTH EVERY DAY 90 tablet 1    omeprazole (PRILOSEC) 40 MG delayed release capsule        No current facility-administered medications on file prior to visit.         Allergies   Allergen Reactions    Ciprofloxacin      G.I. Upset       Past Medical History:   Diagnosis Date    Esophageal reflux     Flushing     HTN (hypertension)     Labyrinthitis, unspecified     Lumbar disc disease     traumatic fall    Migraine     Pain in limb     left leg    Screening mammogram 2/29/2008    (Both)Negative     Past Surgical History:   Procedure Laterality Date    APPENDECTOMY      CHOLECYSTECTOMY      COLONOSCOPY  11/2014 negative - Dr nEgle Doctor ENDOSCOPY  09/2019    Small sliding hiatal hernia     Social History     Socioeconomic History    Marital status: Single     Spouse name: Not on file    Number of children: Not on file    Years of education: Not on file    Highest education level: Not on file   Occupational History    Not on file   Social Needs    Financial resource strain: Not on file    Food insecurity     Worry: Not on file     Inability: Not on file    Transportation needs     Medical: Not on file     Non-medical: Not on file   Tobacco Use    Smoking status: Never Smoker    Smokeless tobacco: Never Used   Substance and Sexual Activity    Alcohol use: No    Drug use: No    Sexual activity: Not on file   Lifestyle    Physical activity     Days per week: Not on file     Minutes per session: Not on file    Stress: Not on file   Relationships    Social connections     Talks on phone: Not on file     Gets together: Not on file     Attends Congregation service: Not on file     Active member of club or organization: Not on file     Attends meetings of clubs or organizations: Not on file     Relationship status: Not on file    Intimate partner violence     Fear of current or ex partner: Not on file     Emotionally abused: Not on file     Physically abused: Not on file     Forced sexual activity: Not on file   Other Topics Concern    Not on file   Social History Narrative    Not on file     Family History   Problem Relation Age of Onset    Cancer Mother         skin, SCC     Immunization History   Administered Date(s) Administered    Influenza Virus Vaccine 10/16/2012    Pneumococcal Conjugate 13-valent (Lonell Azucena) 02/02/2016    Pneumococcal Polysaccharide ([id number]) 03/06/2019    Tdap (Boostrix, Adacel) 01/28/2013    Zoster Live (Zostavax) 01/29/2014       Review of Systems   Constitutional: Negative for fatigue, fever and unexpected weight change.    HENT: Negative for congestion and hearing loss. Eyes: Negative for redness and visual disturbance. Respiratory: Negative for cough, chest tightness, shortness of breath and wheezing. Cardiovascular: Positive for leg swelling. Negative for chest pain. Gastrointestinal: Negative for abdominal pain and nausea. Endocrine: Negative for polydipsia and polyuria. Genitourinary: Negative for dysuria and frequency. Musculoskeletal: Positive for arthralgias. Negative for back pain and neck pain. Skin: Negative for rash and wound. Allergic/Immunologic: Negative for environmental allergies. Neurological: Negative for dizziness, weakness and headaches. Hematological: Negative for adenopathy. Does not bruise/bleed easily. Psychiatric/Behavioral: Negative for sleep disturbance. The patient is not nervous/anxious. Objective:    Physical Exam  Constitutional:       Appearance: She is obese. Cardiovascular:      Rate and Rhythm: Normal rate and regular rhythm. Heart sounds: No murmur. Pulmonary:      Effort: Pulmonary effort is normal.      Breath sounds: No wheezing or rales. Abdominal:      General: Abdomen is flat. Musculoskeletal:      Right lower leg: Edema present. Left lower leg: Edema present. Skin:     General: Skin is warm and dry. Findings: No erythema or rash. Neurological:      Mental Status: She is alert and oriented to person, place, and time. Psychiatric:         Mood and Affect: Mood normal.       Vitals:    11/03/20 0941   BP: 136/80   Pulse: 90   Resp: 16   Temp: 97.5 °F (36.4 °C)   SpO2: 98%       Assessment and plan       1. Essential hypertension  Blood pressure slightly increased. She has not been taking her diuretic Demadex for the last several months. She has slowly been accumulating edema in her lower extremities. 2. Degeneration of meniscus of right knee  Chronic degenerative joint disease of the right knee.   She has been taking Excedrin 2 or 3 times a day for the last few months without much improvement. She had diclofenac before then but does not recall whether it did much better job. She is requesting medication for knee and leg pain. - celecoxib (CELEBREX) 200 MG capsule; Take 1 capsule by mouth daily  Dispense: 30 capsule; Refill: 3    3. Leg pain, bilateral  Bilateral leg pain which I think is both her knee arthritis, lower extremity edema, and chronic back condition after disc rupture 30 years ago. We should try reducing the swelling by having her restart her diuretic. Start on the anti-inflammatory Celebrex. Then proceed with evaluation of her back work-up if her pain is unimproved.

## 2020-11-03 NOTE — PROGRESS NOTES
Pt present due to lower back pain and pain in both legs. By the end of February R knee replacement put on hold due to Matthho but still causing pain. Lower back pain started with fall 30 years ago resulting in 2 herniated disk. Treatment was shots with relief.

## 2020-12-14 ENCOUNTER — VIRTUAL VISIT (OUTPATIENT)
Dept: INTERNAL MEDICINE CLINIC | Age: 70
End: 2020-12-14
Payer: COMMERCIAL

## 2020-12-14 PROCEDURE — 99214 OFFICE O/P EST MOD 30 MIN: CPT | Performed by: INTERNAL MEDICINE

## 2020-12-14 RX ORDER — TORSEMIDE 20 MG/1
TABLET ORAL
Qty: 180 TABLET | Refills: 1 | Status: SHIPPED | OUTPATIENT
Start: 2020-12-14 | End: 2020-12-22

## 2020-12-14 ASSESSMENT — ENCOUNTER SYMPTOMS
SHORTNESS OF BREATH: 1
GASTROINTESTINAL NEGATIVE: 1
CHEST TIGHTNESS: 0

## 2020-12-14 NOTE — ASSESSMENT & PLAN NOTE
She still needs knee surgerypossible total arthroplasty. Checking vascular studies at this time on her legs.

## 2020-12-14 NOTE — PROGRESS NOTES
SUBJECTIVE:  Patient ID: Hunter Vogt is an 79 y.o. female. HPI: Patient here today for the f/u of chronic problems-- see Problem List and associated comments. New issues or complaints include (also see Assessment for more details):      TELEHEALTH EVALUATION -- Audio/Visual (During VWAVX-70 public health emergency)    Pursuant to the emergency declaration under the [address] [place] [address] authority and the [employer] Act, this Virtual  Visit was conducted, with patient's consent, to reduce the patient's risk of exposure to COVID-19 and provide continuity of care for an established patient. Services were provided through a video synchronous discussion virtually to substitute for in-person clinic visit. She has been having worsening edema of her lower extremities for the last 4 to 6 weeks. In the morning when she wakes up there is not much edema but it progresses as the day goes on. She actually becomes edematous enough to have pitting edema by evening. She still has a severely arthritic right knee which will probably need knee replacement. We discussed possibility of compression hose which may be problematic. She does sit a lot for work. During the night she is able to elevate the foot of her bed which she is currently doing to some degree. She wakes up with much less edema in the morning. She occasionally describes little dyspnea with ambulation. Note no chest pain or diaphoresis. She has no wheezing. Review of Systems   Constitutional: Negative for fatigue and fever. Respiratory: Positive for shortness of breath. Negative for chest tightness. Cardiovascular: Positive for leg swelling. Negative for chest pain. Gastrointestinal: Negative. Genitourinary: Negative for difficulty urinating. Musculoskeletal: Positive for arthralgias. Skin: Negative for rash. Neurological: Negative. Psychiatric/Behavioral: Negative. OBJECTIVE:    There were no vitals taken for this visit. Physical Exam  Constitutional:       Comments: Overweight   Pulmonary:      Effort: Pulmonary effort is normal. No respiratory distress. Skin:     Coloration: Skin is not pale. Neurological:      General: No focal deficit present. Mental Status: She is alert and oriented to person, place, and time. Psychiatric:         Mood and Affect: Mood normal.         Thought Content: Thought content normal.         Judgment: Judgment normal.         ASSESSMENT:       Encounter Diagnoses   Name Primary?  Claudication of both lower extremities (HCC) Yes    Edema of both legs     Dyspnea on exertion     Heart murmur     Edema, unspecified type     Osteoarthritis of right knee, unspecified osteoarthritis type     Essential hypertension        Edema  Chronic edema of her legs. Worsens as day goes on. Will increase Demadex to 40 mg since it no longer is as efficacious as prior. Avoid NSAIDs and excess salt. Keep legs elevated. Discussed compression hose which may be problematic. Check vascular studies of extremities and echo. Degenerative arthritis of right knee  She still needs knee surgerypossible total arthroplasty. Checking vascular studies at this time on her legs. Hypertension  Continue losartan        PLAN:See ASSESSMENT for evaluation & PLAN     Orders Placed This Encounter   Procedures    VL DUP LOWER EXTREMITY ARTERIES BILATERAL     Standing Status:   Future     Standing Expiration Date:   12/14/2021     Order Specific Question:   Reason for exam:     Answer:   Chronic lower extremity edema. Family history of PVD    US DOPPLER VENOUS LEGS B/L    ECHO Complete 2D W Doppler W Color     Order Specific Question:   Reason for exam:     Answer:   Dependent edema; some exertional dyspnea.   Evaluate left ventricular function, diastolic function , PMH, SH and FH reviewed and noted. Recent and past labs, tests and consultsalso reviewed. Recent or new meds also reviewed.

## 2020-12-14 NOTE — ASSESSMENT & PLAN NOTE
Chronic edema of her legs. Worsens as day goes on. Will increase Demadex to 40 mg since it no longer is as efficacious as prior. Avoid NSAIDs and excess salt. Keep legs elevated. Discussed compression hose which may be problematic. Check vascular studies of extremities and echo.

## 2020-12-22 RX ORDER — TORSEMIDE 20 MG/1
TABLET ORAL
Qty: 90 TABLET | Refills: 1 | Status: SHIPPED | OUTPATIENT
Start: 2020-12-22 | End: 2021-06-28

## 2021-01-12 ENCOUNTER — TELEPHONE (OUTPATIENT)
Dept: INTERNAL MEDICINE CLINIC | Age: 71
End: 2021-01-12

## 2021-01-12 NOTE — TELEPHONE ENCOUNTER
Hydration and Tylenol are the most important. If she is not hydrating well then she should hold the water pill for a few days. The main thing to watch out for is respiratory distress-if she has difficulty breathing or feels unusually short of breath that she needs to call or get evaluated at the ER.

## 2021-01-12 NOTE — TELEPHONE ENCOUNTER
Patient called stating she had the COVID-19 done on Sunday, 01/10/21, rapid test and she came back positive. She is taking Mucinex, Tylenol and Flonase, is there anything wlse she needs to be taking? He states she is on a water pill, they told her to stay hydrated, should she continue to take the water pill right now?      Please call to advise

## 2021-01-18 ENCOUNTER — TELEPHONE (OUTPATIENT)
Dept: INTERNAL MEDICINE CLINIC | Age: 71
End: 2021-01-18

## 2021-01-18 RX ORDER — ONDANSETRON 4 MG/1
4 TABLET, FILM COATED ORAL EVERY 6 HOURS PRN
Qty: 20 TABLET | Refills: 1 | Status: SHIPPED | OUTPATIENT
Start: 2021-01-18 | End: 2021-04-08

## 2021-01-18 NOTE — TELEPHONE ENCOUNTER
Stay hydrated. Okay to use some Afrin nasal spray for a few days if necessary for nasal congestion. Can alternate Tylenol and Advil for the fever every 4 hours.

## 2021-01-18 NOTE — TELEPHONE ENCOUNTER
Day 10 of covid 19 and no cough but fevers are still really high and she cant get them to come down, she also has head congestion and cant get anything to help. She tried mucinex it does not help and she has a little diarrhea and no appetite.  Please advise

## 2021-01-18 NOTE — TELEPHONE ENCOUNTER
SPOKE TO PT GAVE ADVISE   Pt wants to know if something can be called in for nausea?     Sent to [facility] on [address] and [id number]

## 2021-01-25 ENCOUNTER — TELEPHONE (OUTPATIENT)
Dept: INTERNAL MEDICINE CLINIC | Age: 71
End: 2021-01-25

## 2021-01-25 RX ORDER — DEXAMETHASONE 4 MG/1
4 TABLET ORAL 2 TIMES DAILY WITH MEALS
Qty: 14 TABLET | Refills: 0 | Status: SHIPPED | OUTPATIENT
Start: 2021-01-25 | End: 2021-02-01

## 2021-01-25 NOTE — TELEPHONE ENCOUNTER
MD stated if it has been over 10 days from the  onset of sxs, and she has no fever, then she can leave quarantine.    And unless her job requires it - but I do not rec this

## 2021-01-25 NOTE — TELEPHONE ENCOUNTER
Pt states she still test positive for covid 15 days later and still has sinus congestion no fever but is miserable as far as sinuses please advise she would like some antibiotics or something the nose spray is not helping.

## 2021-01-25 NOTE — TELEPHONE ENCOUNTER
Its not unusual to still test positive for Covid. Viral remnants present. If she is not blowing any green or yellow thick mucus out of her sinuses and it may only be some inflammation and I would actually give her a short course of steroids.   Decadron 4 mg    #14     1 twice daily

## 2021-01-25 NOTE — TELEPHONE ENCOUNTER
Script sent   Pt aware   Pt wants to know if she has to quarantine still? Only symptom she is still having is congestion park the mucus is clear when it does come out.    Also if she does not have to quarantine does she have to test until she gets a negative

## 2021-02-02 ENCOUNTER — TELEPHONE (OUTPATIENT)
Dept: INTERNAL MEDICINE CLINIC | Age: 71
End: 2021-02-02

## 2021-02-02 DIAGNOSIS — I73.9 CLAUDICATION (HCC): ICD-10-CM

## 2021-02-02 DIAGNOSIS — I87.2 VENOUS INSUFFICIENCY: ICD-10-CM

## 2021-02-02 DIAGNOSIS — M79.89 RIGHT LEG SWELLING: ICD-10-CM

## 2021-02-02 DIAGNOSIS — R60.0 LEG EDEMA: ICD-10-CM

## 2021-02-02 DIAGNOSIS — M79.89 LEFT LEG SWELLING: Primary | ICD-10-CM

## 2021-02-02 DIAGNOSIS — Z20.822 EXPOSURE TO COVID-19 VIRUS: ICD-10-CM

## 2021-02-02 DIAGNOSIS — I73.9 PERIPHERAL ARTERIAL DISEASE (HCC): ICD-10-CM

## 2021-02-02 NOTE — TELEPHONE ENCOUNTER
If she has had the symptoms for this long the Covid test may actually be negative at this point.   She may want to get the complete antibody test-blood test.

## 2021-02-02 NOTE — TELEPHONE ENCOUNTER
Pt has been dealing with a fever ,loss of taste, loss of smell and diarrhea for about 3 weeks now and she would like to know if she should go have the covid test done again?  Pt was given covid number [id number] and she stated she had it done twice already and if she had to go get another one this would be the 3rd one pls advise she is awaiting on a call back before she call the number

## 2021-02-03 ENCOUNTER — TELEPHONE (OUTPATIENT)
Dept: INTERNAL MEDICINE CLINIC | Age: 71
End: 2021-02-03

## 2021-02-04 DIAGNOSIS — I10 ESSENTIAL HYPERTENSION: ICD-10-CM

## 2021-02-04 DIAGNOSIS — R31.29 MICROHEMATURIA: ICD-10-CM

## 2021-02-04 DIAGNOSIS — Z00.00 PREVENTATIVE HEALTH CARE: ICD-10-CM

## 2021-02-04 DIAGNOSIS — Z20.822 EXPOSURE TO COVID-19 VIRUS: ICD-10-CM

## 2021-02-04 DIAGNOSIS — E55.9 VITAMIN D DEFICIENCY: ICD-10-CM

## 2021-02-04 LAB
A/G RATIO: 1.6 (ref 1.1–2.2)
ALBUMIN SERPL-MCNC: 3.9 G/DL (ref 3.4–5)
ALP BLD-CCNC: 111 U/L (ref 40–129)
ALT SERPL-CCNC: 24 U/L (ref 10–40)
ANION GAP SERPL CALCULATED.3IONS-SCNC: 15 MMOL/L (ref 3–16)
AST SERPL-CCNC: 15 U/L (ref 15–37)
BACTERIA: ABNORMAL /HPF
BASOPHILS ABSOLUTE: 0 K/UL (ref 0–0.2)
BASOPHILS RELATIVE PERCENT: 0.3 %
BILIRUB SERPL-MCNC: 0.9 MG/DL (ref 0–1)
BILIRUBIN URINE: NEGATIVE
BLOOD, URINE: ABNORMAL
BUN BLDV-MCNC: 14 MG/DL (ref 7–20)
CALCIUM SERPL-MCNC: 8.8 MG/DL (ref 8.3–10.6)
CHLORIDE BLD-SCNC: 97 MMOL/L (ref 99–110)
CHOLESTEROL, TOTAL: 163 MG/DL (ref 0–199)
CLARITY: ABNORMAL
CO2: 25 MMOL/L (ref 21–32)
COLOR: YELLOW
CREAT SERPL-MCNC: 0.6 MG/DL (ref 0.6–1.2)
EOSINOPHILS ABSOLUTE: 0.1 K/UL (ref 0–0.6)
EOSINOPHILS RELATIVE PERCENT: 1.3 %
EPITHELIAL CELLS, UA: 10 /HPF (ref 0–5)
GFR AFRICAN AMERICAN: >60
GFR NON-AFRICAN AMERICAN: >60
GLOBULIN: 2.5 G/DL
GLUCOSE BLD-MCNC: 88 MG/DL (ref 70–99)
GLUCOSE URINE: NEGATIVE MG/DL
HCT VFR BLD CALC: 43 % (ref 36–48)
HDLC SERPL-MCNC: 49 MG/DL (ref 40–60)
HEMOGLOBIN: 14.1 G/DL (ref 12–16)
HYALINE CASTS: 2 /LPF (ref 0–8)
KETONES, URINE: NEGATIVE MG/DL
LDL CHOLESTEROL CALCULATED: 74 MG/DL
LEUKOCYTE ESTERASE, URINE: ABNORMAL
LYMPHOCYTES ABSOLUTE: 1.5 K/UL (ref 1–5.1)
LYMPHOCYTES RELATIVE PERCENT: 19.2 %
MCH RBC QN AUTO: 28.2 PG (ref 26–34)
MCHC RBC AUTO-ENTMCNC: 32.8 G/DL (ref 31–36)
MCV RBC AUTO: 85.9 FL (ref 80–100)
MICROSCOPIC EXAMINATION: YES
MONOCYTES ABSOLUTE: 0.7 K/UL (ref 0–1.3)
MONOCYTES RELATIVE PERCENT: 9.3 %
NEUTROPHILS ABSOLUTE: 5.4 K/UL (ref 1.7–7.7)
NEUTROPHILS RELATIVE PERCENT: 69.9 %
NITRITE, URINE: NEGATIVE
PDW BLD-RTO: 15.8 % (ref 12.4–15.4)
PH UA: 6 (ref 5–8)
PLATELET # BLD: 395 K/UL (ref 135–450)
PMV BLD AUTO: 8.5 FL (ref 5–10.5)
POTASSIUM SERPL-SCNC: 3.5 MMOL/L (ref 3.5–5.1)
PROTEIN UA: ABNORMAL MG/DL
RBC # BLD: 5 M/UL (ref 4–5.2)
RBC UA: ABNORMAL /HPF (ref 0–4)
SARS-COV-2 ANTIBODY, TOTAL: POSITIVE
SODIUM BLD-SCNC: 137 MMOL/L (ref 136–145)
SPECIFIC GRAVITY UA: 1.02 (ref 1–1.03)
TOTAL PROTEIN: 6.4 G/DL (ref 6.4–8.2)
TRIGL SERPL-MCNC: 200 MG/DL (ref 0–150)
TSH REFLEX FT4: 0.9 UIU/ML (ref 0.27–4.2)
URINE TYPE: ABNORMAL
UROBILINOGEN, URINE: 0.2 E.U./DL
VITAMIN D 25-HYDROXY: 25.1 NG/ML
VLDLC SERPL CALC-MCNC: 40 MG/DL
WBC # BLD: 7.7 K/UL (ref 4–11)
WBC UA: 303 /HPF (ref 0–5)

## 2021-02-05 RX ORDER — CEFUROXIME AXETIL 250 MG/1
250 TABLET ORAL 2 TIMES DAILY
Qty: 10 TABLET | Refills: 0 | Status: SHIPPED | OUTPATIENT
Start: 2021-02-05 | End: 2021-02-10

## 2021-02-05 NOTE — TELEPHONE ENCOUNTER
For the arterial Doppler use peripheral arterial disease; claudication  For the venous Doppler use leg edema, venous insufficiency

## 2021-02-05 NOTE — TELEPHONE ENCOUNTER
Magdy Cornelius with scheduling called and  Stated that if you really want arterial and venous then arterial has to have a different DX code. What code would you want to use?

## 2021-03-01 ENCOUNTER — HOSPITAL ENCOUNTER (OUTPATIENT)
Dept: VASCULAR LAB | Age: 71
Discharge: HOME | End: 2021-03-01
Payer: COMMERCIAL

## 2021-03-01 DIAGNOSIS — I73.9 PERIPHERAL ARTERIAL DISEASE (HCC): ICD-10-CM

## 2021-03-01 DIAGNOSIS — I73.9 CLAUDICATION (HCC): ICD-10-CM

## 2021-03-01 PROCEDURE — 93925 LOWER EXTREMITY STUDY: CPT

## 2021-03-05 ENCOUNTER — OFFICE VISIT (OUTPATIENT)
Dept: INTERNAL MEDICINE CLINIC | Age: 71
End: 2021-03-05
Payer: COMMERCIAL

## 2021-03-05 VITALS
TEMPERATURE: 97.8 F | HEART RATE: 72 BPM | OXYGEN SATURATION: 98 % | SYSTOLIC BLOOD PRESSURE: 140 MMHG | BODY MASS INDEX: 42.53 KG/M2 | WEIGHT: 271 LBS | DIASTOLIC BLOOD PRESSURE: 82 MMHG | HEIGHT: 67 IN

## 2021-03-05 DIAGNOSIS — Z12.39 SCREENING BREAST EXAMINATION: ICD-10-CM

## 2021-03-05 DIAGNOSIS — M79.89 LEFT LEG SWELLING: ICD-10-CM

## 2021-03-05 DIAGNOSIS — M79.89 RIGHT LEG SWELLING: ICD-10-CM

## 2021-03-05 DIAGNOSIS — Z00.00 PREVENTATIVE HEALTH CARE: ICD-10-CM

## 2021-03-05 DIAGNOSIS — M23.306 DEGENERATION OF MENISCUS OF RIGHT KNEE: ICD-10-CM

## 2021-03-05 DIAGNOSIS — I10 ESSENTIAL HYPERTENSION: ICD-10-CM

## 2021-03-05 DIAGNOSIS — Z00.00 ANNUAL PHYSICAL EXAM: Primary | ICD-10-CM

## 2021-03-05 DIAGNOSIS — E55.9 VITAMIN D DEFICIENCY: ICD-10-CM

## 2021-03-05 DIAGNOSIS — R60.9 EDEMA, UNSPECIFIED TYPE: ICD-10-CM

## 2021-03-05 PROBLEM — G89.29 CHRONIC LOW BACK PAIN: Status: ACTIVE | Noted: 2021-03-05

## 2021-03-05 PROBLEM — R51.9 FRONTAL HEADACHE: Status: RESOLVED | Noted: 2020-03-06 | Resolved: 2021-03-05

## 2021-03-05 PROBLEM — M54.50 CHRONIC LOW BACK PAIN: Status: ACTIVE | Noted: 2021-03-05

## 2021-03-05 LAB — CORTISOL TOTAL: 12.8 UG/DL

## 2021-03-05 PROCEDURE — 93000 ELECTROCARDIOGRAM COMPLETE: CPT | Performed by: INTERNAL MEDICINE

## 2021-03-05 PROCEDURE — 99397 PER PM REEVAL EST PAT 65+ YR: CPT | Performed by: INTERNAL MEDICINE

## 2021-03-05 RX ORDER — SPIRONOLACTONE 25 MG/1
25 TABLET ORAL DAILY
Qty: 30 TABLET | Refills: 5 | Status: SHIPPED | OUTPATIENT
Start: 2021-03-05 | End: 2021-06-21

## 2021-03-05 ASSESSMENT — PATIENT HEALTH QUESTIONNAIRE - PHQ9
1. LITTLE INTEREST OR PLEASURE IN DOING THINGS: 0
SUM OF ALL RESPONSES TO PHQ QUESTIONS 1-9: 0
SUM OF ALL RESPONSES TO PHQ9 QUESTIONS 1 & 2: 0

## 2021-03-05 ASSESSMENT — ENCOUNTER SYMPTOMS
SHORTNESS OF BREATH: 0
GASTROINTESTINAL NEGATIVE: 1
BACK PAIN: 1
TROUBLE SWALLOWING: 0

## 2021-03-05 NOTE — ASSESSMENT & PLAN NOTE
Exacerbated in bilateral lower extremities. Suspect multifactorial including venous disease and obesity. Arterial studies okay. Echocardiogram and venous Dopplers pending. Continue Demadex 20 mg. Add spironolactone 25 mg. Strongly encourage weight loss. Keep legs elevated. Compression hose would be problematic. She did note that her back felt better while she was on steroids for the Covid. Also she thought her edema was slightly less in her feet.

## 2021-03-05 NOTE — ASSESSMENT & PLAN NOTE
She has known lower lumbar disc disease. Situation is tolerable at this time. She denies any intervention.

## 2021-03-05 NOTE — PROGRESS NOTES
SUBJECTIVE:  Patient ID: Vernon English is an 79 y.o. female. HPI: Patient here today for the f/u of chronic problems-- see Problem List and associated comments. New issues or complaints include (also see Assessment for more details): Here for her yearly checkup. Her main problem is her swelling in her legs. This is been getting worse over the last couple months. Seem to exacerbate during her Covid infection 2 months ago. She has recovered from the Covid except she still feels occasionally fuzzy in her thinking and memory. She has returned to work. Review of Systems   Constitutional: Positive for fatigue. Negative for fever. HENT: Negative for trouble swallowing. Eyes: Negative for visual disturbance. Respiratory: Negative for shortness of breath. Cardiovascular: Positive for leg swelling. Negative for chest pain. Gastrointestinal: Negative. Genitourinary: Negative for difficulty urinating. Musculoskeletal: Positive for arthralgias and back pain. Skin: Negative for rash. Neurological: Negative for headaches. Hematological: Negative for adenopathy. Psychiatric/Behavioral: Negative. OBJECTIVE:    BP (!) 140/82   Pulse 72   Temp 97.8 °F (36.6 °C)   Ht 5' 7\" (1.702 m)   Wt 271 lb (122.9 kg)   SpO2 98%   BMI 42.44 kg/m²      Physical Exam  Constitutional:       General: She is not in acute distress. Appearance: She is well-developed. She is obese. She is not diaphoretic. Comments: overweight    HENT:      Head: Normocephalic and atraumatic. Comments: Tender over right frontal sinus area     Right Ear: External ear normal.      Left Ear: External ear normal.      Nose: Nose normal.      Mouth/Throat:      Pharynx: No oropharyngeal exudate. Eyes:      General: No scleral icterus. Right eye: No discharge. Left eye: No discharge. Extraocular Movements: Extraocular movements intact.       Conjunctiva/sclera: Conjunctivae normal. Pupils: Pupils are equal, round, and reactive to light. Neck:      Musculoskeletal: Normal range of motion and neck supple. Thyroid: No thyromegaly. Vascular: No carotid bruit or JVD. Trachea: No tracheal deviation. Cardiovascular:      Rate and Rhythm: Normal rate and regular rhythm. Pulses:           Carotid pulses are 2+ on the right side and 2+ on the left side. Radial pulses are 2+ on the right side and 2+ on the left side. Heart sounds: Normal heart sounds. No murmur. No friction rub. No gallop. Pulmonary:      Effort: Pulmonary effort is normal. No respiratory distress. Breath sounds: Normal breath sounds. No stridor. No decreased breath sounds, wheezing or rales. Abdominal:      General: Bowel sounds are normal. There is no distension or abdominal bruit. Palpations: Abdomen is soft. Musculoskeletal: Normal range of motion. General: No tenderness. Right lower leg: Edema present. Left lower leg: Edema present. Comments: SLR negative. Some right hip pain with hip rotation. Lymphadenopathy:      Head:      Right side of head: No submandibular adenopathy. Left side of head: No submandibular adenopathy. Cervical: No cervical adenopathy. Upper Body:      Right upper body: No supraclavicular adenopathy. Left upper body: No supraclavicular adenopathy. Skin:     General: Skin is warm and dry. Coloration: Skin is not pale. Findings: No erythema or rash. Comments: Nevi     Neurological:      Mental Status: She is alert and oriented to person, place, and time. Cranial Nerves: No cranial nerve deficit. Motor: No tremor or abnormal muscle tone. Coordination: Coordination normal.      Gait: Gait normal.      Deep Tendon Reflexes: Reflexes are normal and symmetric. Reflex Scores:       Bicep reflexes are 2+ on the right side and 2+ on the left side. Order Specific Question:   8AM or 4PM?     Answer:   NA    EKG 12 Lead     Order Specific Question:   Reason for Exam?     Answer:   Chest pain     , PMH, SH and FH reviewed and noted. Recent and past labs, tests and consultsalso reviewed. Recent or new meds also reviewed.

## 2021-03-08 ENCOUNTER — HOSPITAL ENCOUNTER (OUTPATIENT)
Dept: VASCULAR LAB | Age: 71
Discharge: HOME | End: 2021-03-08
Payer: COMMERCIAL

## 2021-03-08 ENCOUNTER — HOSPITAL ENCOUNTER (OUTPATIENT)
Dept: NON INVASIVE DIAGNOSTICS | Age: 71
Discharge: HOME | End: 2021-03-08
Payer: COMMERCIAL

## 2021-03-08 DIAGNOSIS — I87.2 VENOUS INSUFFICIENCY: ICD-10-CM

## 2021-03-08 DIAGNOSIS — R60.0 LEG EDEMA: ICD-10-CM

## 2021-03-08 DIAGNOSIS — M79.89 RIGHT LEG SWELLING: ICD-10-CM

## 2021-03-08 DIAGNOSIS — M79.89 LEFT LEG SWELLING: ICD-10-CM

## 2021-03-08 LAB
LV EF: 58 %
LVEF MODALITY: NORMAL

## 2021-03-08 PROCEDURE — 93306 TTE W/DOPPLER COMPLETE: CPT

## 2021-03-08 PROCEDURE — 93970 EXTREMITY STUDY: CPT

## 2021-03-09 DIAGNOSIS — M79.604 LEG PAIN, BILATERAL: Primary | ICD-10-CM

## 2021-03-09 DIAGNOSIS — M79.605 LEG PAIN, BILATERAL: Primary | ICD-10-CM

## 2021-03-09 RX ORDER — TRAMADOL HYDROCHLORIDE 50 MG/1
50 TABLET ORAL 2 TIMES DAILY PRN
Qty: 60 TABLET | Refills: 0 | Status: SHIPPED | OUTPATIENT
Start: 2021-03-09 | End: 2021-11-22 | Stop reason: ALTCHOICE

## 2021-03-10 ENCOUNTER — TELEPHONE (OUTPATIENT)
Dept: ORTHOPEDIC SURGERY | Age: 71
End: 2021-03-10

## 2021-03-16 ENCOUNTER — IMMUNIZATION (OUTPATIENT)
Dept: PRIMARY CARE CLINIC | Age: 71
End: 2021-03-16
Payer: COMMERCIAL

## 2021-03-16 PROCEDURE — 0001A COVID-19, PFIZER VACCINE 30MCG/0.3ML DOSE: CPT | Performed by: FAMILY MEDICINE

## 2021-03-16 PROCEDURE — 91300 COVID-19, PFIZER VACCINE 30MCG/0.3ML DOSE: CPT | Performed by: FAMILY MEDICINE

## 2021-03-19 ENCOUNTER — TELEPHONE (OUTPATIENT)
Dept: VASCULAR SURGERY | Age: 71
End: 2021-03-19

## 2021-03-19 DIAGNOSIS — M25.473 ANKLE SWELLING, UNSPECIFIED LATERALITY: Primary | ICD-10-CM

## 2021-04-04 PROBLEM — Z00.00 PREVENTATIVE HEALTH CARE: Status: RESOLVED | Noted: 2018-03-05 | Resolved: 2021-04-04

## 2021-04-06 ENCOUNTER — IMMUNIZATION (OUTPATIENT)
Dept: PRIMARY CARE CLINIC | Age: 71
End: 2021-04-06
Payer: COMMERCIAL

## 2021-04-06 PROCEDURE — 0002A COVID-19, PFIZER VACCINE 30MCG/0.3ML DOSE: CPT | Performed by: FAMILY MEDICINE

## 2021-04-06 PROCEDURE — 91300 COVID-19, PFIZER VACCINE 30MCG/0.3ML DOSE: CPT | Performed by: FAMILY MEDICINE

## 2021-04-08 ENCOUNTER — OFFICE VISIT (OUTPATIENT)
Dept: VASCULAR SURGERY | Age: 71
End: 2021-04-08
Payer: COMMERCIAL

## 2021-04-08 VITALS
SYSTOLIC BLOOD PRESSURE: 140 MMHG | DIASTOLIC BLOOD PRESSURE: 74 MMHG | BODY MASS INDEX: 41.81 KG/M2 | HEIGHT: 67 IN | WEIGHT: 266.4 LBS | TEMPERATURE: 97.1 F | HEART RATE: 86 BPM

## 2021-04-08 DIAGNOSIS — I89.0 LYMPHEDEMA OF BOTH LOWER EXTREMITIES: Primary | ICD-10-CM

## 2021-04-08 PROCEDURE — 99203 OFFICE O/P NEW LOW 30 MIN: CPT | Performed by: SURGERY

## 2021-04-08 NOTE — PROGRESS NOTES
Sarabjit Rogers (:  1950) is a 79 y.o. female,New patient, here for evaluation of the following chief complaint(s):  New Patient (Np referred per Dr Eileen Monge for swollen ankels and calves, present swelling since 2020)      ASSESSMENT/PLAN:  1. Lymphedema of both lower extremities  -     Ambulatory referral to Physical Therapy  She appears to have lymphedema changes of the lower extremities, likely secondary to long-term chronic venous insufficiency. --Refer to physical therapy for lymphedema massage and wrap   --Discussed the importance of long-term compression stockings   --Discussed the importance of increased walking, elevation of the legs, and weight management. All questions were answered and she expressed understanding. Return if symptoms worsen or fail to improve. SUBJECTIVE/OBJECTIVE:  Ms. Alejandro Willard is a very nice 77-year-old female who presents today for evaluation of chronic leg swelling. This has been present for many years, though worse over the last few months. She does have a sedentary job, and has been more sedentary since the Covid pandemic, as she has been working from home and moving around less. At this point, she reports it is difficult to walk secondary to the swelling and heaviness in her legs. She has not been able to wear compression. She denies any coronary symptoms, denies chest pain shortness of breath, dyspnea on exertion, or history of congestive heart failure. She is a non-smoker, not diabetic. Review of Systems   All other systems reviewed and are negative. Physical Exam  Constitutional:       General: She is not in acute distress. Appearance: Normal appearance. She is obese. She is not ill-appearing. Neck:      Musculoskeletal: Normal range of motion and neck supple. Cardiovascular:      Rate and Rhythm: Normal rate and regular rhythm. Pulses: Normal pulses. Heart sounds: No murmur. No friction rub. No gallop.     Pulmonary:

## 2021-04-14 ENCOUNTER — HOSPITAL ENCOUNTER (OUTPATIENT)
Dept: PHYSICAL THERAPY | Age: 71
Setting detail: THERAPIES SERIES
Discharge: HOME | End: 2021-04-14
Payer: COMMERCIAL

## 2021-04-14 PROCEDURE — 97161 PT EVAL LOW COMPLEX 20 MIN: CPT

## 2021-04-14 PROCEDURE — 97530 THERAPEUTIC ACTIVITIES: CPT

## 2021-04-14 NOTE — FLOWSHEET NOTE
[address] [facility]  Phone: [phone]   Fax: [phone]    Physical Therapy Daily LYMPHEDEMA Treatment Note    Date:  2021    Patient Name:  Pietro Garcia    :  1950  MRN: [id number]  Restrictions/Precautions:    Medical/Treatment Diagnosis Information:  · Diagnosis: Bilateral LE lymphedema  ·    Insurance/Certification information:  PT Insurance Information: [employer]---90/10 plan, medical necessity  Physician Information:  Referring Practitioner: Dr. Marine Esparza of care signed (Y/N): []  Yes [x]  No     Date of Patient follow up with Physician:     Functional scale[de-identified]  LLIS  raw score = 28/72 ; dysfunction = 38%    Progress Report: [x]  Yes  []  No     Date Range for reporting period:  Beginning  Ending    Progress report due (10 Rx/or 30 days whichever is less): visit #33 or      Recertification due (POC duration/ or 90 days whichever is less): visit #*     Visit # Insurance Allowable Auth required?  Date Range    [place] []  Yes  [x]  No         Latex Allergy:  [x]NO      []YES  Preferred Language for Healthcare:   [x]English       []other:      Pain level:  4-5/10     SUBJECTIVE:  See eval    OBJECTIVE: See eval      RESTRICTIONS/PRECAUTIONS:     Exercises/Interventions:     Therapeutic Exercises (07046) Resistance / level Sets/sec Reps Notes          Pt educated on exercises to stimulate lymphatic flow                                     Therapeutic Activities (32773)  (Dynamic activities such as compression, designed to improve functional performance)  30 min  Discussed purchase of wraps and provided handout detailing where to obtain   Compression: trial tensoshape size   applied to        Multilayer compression bandaging to   Stockinette  Artiflex  Foam   cm low stretch compression bandage  cm low stretch compression bandage  cm low stretch compression bandage   cm low stretch compression bandage Home Management  (providing pt education on safety procedures/instructions)  30 min     Pt educated on compression as follows:   how to appropriately apply and wear compression  how to maintain appropriate gradient compression  do not sleep with compression garment/tensoshape  signs and symptoms of constriction   when to remove compression    X        X    x     Pt educated on lymphedema prevention and management   x                                        Neuromuscular Re-ed (24279)                            Manual Intervention (89981)       See MLD flowchart below                                            Manual Lymph Drainage (MLD):  MLD to B LE, clearing along alternate pathway to ipsilateral axillary lymph nodes    Clear Nodes 10x each   Neck    [address]    Axilla    Abdomen    Groin    Popliteal x2    Clear Alternate Pathway 10x each   Re-clear alternate pathway   x5 each position    Location        Fluid Mobilization 10x each   Re-clear alternate pathway   x5 each position    Shoulder bracing    Location        Protein Resorption 10x each   Location        Clear Foot/Ankle or Hand 10x each   Achilles    Bilateral malleolus    Fan the cards    Clear dorsum    Clear through web space    Clear toes/fingers    Fluid mobilization    Re-clear all positions  X5 each            Modalities:     OTHER:     Pt education:   Pt educated on pathology and anatomy of etiology of lymphedema, condition precautions, indications for long term prognosis. Pt was educated on diagnosis; prognosis; PT POC including MLD, compression (role of multilayer bandaging/ compression garments), lymphedema management/prevention of flare ups, role of exercise, HEP, lymphedema pump; expectations for rehab.     All pt questions were answered and handouts provided    HEP instruction:node clearance and swipe technique to be discussed next visit       Therapeutic Exercise and NMR EXR  [] (16490) Provided verbal/tactile cueing for activities related to strengthening, flexibility, endurance, ROM for improvements in  [] LE / Lumbar: LE, proximal hip, and core control with self care, mobility, lifting, ambulation. [] UE / Cervical: cervical, postural, scapular, scapulothoracic and UE control with self care, reaching, carrying, lifting, house/yardwork, driving, computer work.  [] (25518) Provided verbal/tactile cueing for activities related to improving balance, coordination, kinesthetic sense, posture, motor skill, proprioception to assist with   [] LE / lumbar: LE, proximal hip, and core control in self care, mobility, lifting, ambulation and eccentric single leg control. [] UE / cervical: cervical, scapular, scapulothoracic and UE control with self care, reaching, carrying, lifting, house/yardwork, driving, computer work.   [] (02292) Therapist is in constant attendance of 2 or more patients providing skilled therapy interventions, but not providing any significant amount of measurable one-on-one time to either patient, for improvements in  [] LE / lumbar: LE, proximal hip, and core control in self care, mobility, lifting, ambulation and eccentric single leg control. [] UE / cervical: cervical, scapular, scapulothoracic and UE control with self care, reaching, carrying, lifting, house/yardwork, driving, computer work.      NMR and Therapeutic Activities:    [] (60139 or 74983) Provided verbal/tactile cueing for activities related to improving balance, coordination, kinesthetic sense, posture, motor skill, proprioception and motor activation to allow for proper function of   [] LE: / Lumbar core, proximal hip and LE with self care and ADLs  [] UE / Cervical: cervical, postural, scapular, scapulothoracic and UE control with self care, carrying, lifting, driving, computer work.   [] (37410) Gait Re-education- Provided training and instruction to the patient for proper LE, core and proximal hip recruitment and positioning and eccentric body weight control with ambulation re-education including up and down stairs     Home Management Training / Self Care:  [] (07052) Provided self-care/home management training related to activities of daily living and compensatory training, and/or use of adaptive equipment for improvement with: ADLs and compensatory training, meal preparation, safety procedures and instruction in use of adaptive equipment, including bathing, grooming, dressing, personal hygiene, basic household cleaning and chores. Home Exercise Program:    [x] (20189) Reviewed/Progressed HEP activities related to strengthening, flexibility, endurance, ROM of   [] LE / Lumbar: core, proximal hip and LE for functional self-care, mobility, lifting and ambulation/stair navigation   [] UE / Cervical: cervical, postural, scapular, scapulothoracic and UE control with self care, reaching, carrying, lifting, house/yardwork, driving, computer work  [] (20587)Reviewed/Progressed HEP activities related to improving balance, coordination, kinesthetic sense, posture, motor skill, proprioception of   [] LE: core, proximal hip and LE for self care, mobility, lifting, and ambulation/stair navigation    [] UE / Cervical: cervical, postural,  scapular, scapulothoracic and UE control with self care, reaching, carrying, lifting, house/yardwork, driving, computer work    Manual Treatments:  PROM / STM / Oscillations-Mobs:  G-I, II, III, IV (PA's, Inf., Post.)  [] (22819) Provided manual therapy to mobilize LE, proximal hip and/or LS spine soft tissue/joints for the purpose of modulating pain, promoting relaxation,  increasing ROM, reducing/eliminating soft tissue swelling/inflammation/restriction, improving soft tissue extensibility and allowing for proper ROM for normal function with   [] LE / lumbar: self care, mobility, lifting and ambulation. [] UE / Cervical: self care, reaching, carrying, lifting, house/yardwork, driving, computer work.      Modalities:  [] progressing as expected towards functional goals listed. [] Progression is slowed due to complexities/Impairments listed. [] Progression has been slowed due to co-morbidities. [x] Plan just implemented, too soon to assess goals progression <30days   [] Goals require adjustment due to lack of progress  [] Patient is not progressing as expected and requires additional follow up with physician  [] Other    Persisting Functional Limitations/Impairments:  []Sleeping []Sitting               [x]Standing [x]Transfers        [x]Walking []Kneeling               []Stairs []Squatting / bending   [x]ADLs []Reaching  []Lifting  []Housework  []Driving []Job related tasks  []Sports/Recreation [x]Other:donning/doffing shoe and pants        ASSESSMENT:  See eval  Treatment/Activity Tolerance:  [] Patient able to complete tx [] Patient limited by fatigue  [] Patient limited by pain  [] Patient limited by other medical complications  [] Other:     Prognosis: [x] Good [] Fair  [] Poor    Patient Requires Follow-up: [x] Yes  [] No    Plan for next treatment session:   MLD, compression, HEP, pt education, lymph pump as appropriate, exercise to stimulate lymphatic flow    PLAN: See eval. PT 2x / week for 6    weeks. [] Continue per plan of care [] Alter current plan (see comments)  [x] Plan of care initiated [] Hold pending MD visit [] Discharge    Electronically signed by: Anna Villegas DPT [id number]    Note: If patient does not return for scheduled/ recommended follow up visits, this note will serve as a discharge from care along with most recent update on progress.

## 2021-04-14 NOTE — PLAN OF CARE
having some trouble climbing up and down steps and tends to do it in a step to fashion. Current Level of Function:  Prior Level of Function: Prior to this injury / incident, pt was independent with ADLs and IADLs; walks without AD and without a limp    Living Status: lives alone in [facility], has to climb multiple flights to reach condo unit  Occupation/School:     PAIN:  Pain Scale:4-5 /10, current, worsens at times  Easing factors: rest  Provocative factors:  Sitting, standing    Functional Outcome: LLIS: 28  taken at initial eval    Precautions/ Contra-indications: n/a  Latex Allergy:  [x]NO      []YES  Relevant Medical History:  [x] Patient history, allergies, meds reviewed. Medical chart reviewed. See intake form. Review Of Systems (ROS):  [x]Performed Review of systems (Integumentary, CardioPulmonary, Neurological) by intake and observation. Intake form has been scanned into medical record. Patient has been instructed to contact their primary care physician regarding ROS issues if not already being addressed at this time.       Co-morbidities/Complexities (which will affect course of rehabilitation):   []None        [x]Hx of COVID   Arthritic conditions   []Rheumatoid arthritis (M05.9)  [x]Osteoarthritis (M19.91)  []Gout   Cardiovascular conditions   [x]Hypertension (I10)  []Hyperlipidemia (E78.5)  []Angina pectoris (I20)  []Atherosclerosis (I70)  []Pacemaker  []Hx of CABG/stent/  cardiac surgeries   Musculoskeletal conditions   []Disc pathology   []Congenital spine pathologies   []Osteoporosis (M81.8)  []Osteopenia (M85.8)  []Scoliosis       Endocrine conditions   []Hypothyroid (E03.9)  []Hyperthyroid Gastrointestinal conditions   []Constipation (O37.72)   Metabolic conditions   []Morbid obesity (E66.01)  []Diabetes type 1(E10.65) or 2 (E11.65)   []Neuropathy (G60.9)     Cardio/Pulmonary conditions   []Asthma (J45)  []Coughing   []COPD (J44.9)  []CHF  []A-fib   Psychological Disorders  []Anxiety (F41.9)  []Depression (F32.9)   []Other:   Developmental Disorders  []Autism (F84.0)  []CP (G80)  []Down Syndrome (Q90.9)  []Developmental delay     Neurological conditions  []Prior Stroke (I69.30)  []Parkinson's (G20)  []Encephalopathy (G93.40)  []MS (G35)  []Post-polio (G14)  []SCI  []TBI  []ALS Other conditions  []Fibromyalgia (M79.7)  []Vertigo  []Syncope  []Kidney Failure  []Cancer      []currently undergoing                treatment  []Pregnancy  []Incontinence   Prior surgeries  []involved limb  []previous spinal surgery  [] section birth  []hysterectomy  []bowel / bladder surgery  []other relevant surgeries   []Other:                OBJECTIVE:       Functional Mobility/TransfersGAIT: indep      ROM Right Left Comments         LE      Has been told that she needs a right knee replacement; AROM B LE functionally intact                                                         Pitting   [] none [] slightly [x] moderate [] severe [] brawny (does not indent)   Color    [] dusky [x] mottled [] red streaks [] other:  Skin Texture   [] rough  [x] dry   [] moist  [] normal  [] hyperkaratosis [] hyperplasia  [] hyperpigmentation [] Elephantiasis  [] papillomas  [] Skin breakdown with lymphorrhea (weeping)  Skin Temperature   [x] normal [] cool  [] uneven [] warm [] hot  Edema Rebound  [] quick [x] slow [] fibrotic tissue  *pressure applied x10 seconds    Signs of Constriction:  Condition of Nailbeds:   [x] discolored [] red  [] white [] swollen     Skin Breakdown (indicate size, location and number) [] Yes [x] No  Comments:  Fistulas (an abnormal passageway) [] Yes  [x] No  Comments:  Tinea (fungus) [x] Yes [] No  Comments: toenails  Papilloma (benign tumor arising from an epithelial layer)  [] Yes [x] No  Comments:   Fibrotic areas [] Yes [x] No  Comments:   Lymphorrhea (flow of lymph from a cut or ruptured lymph vessels): [] Yes [x] No  Comments:  Warts (a local growth of the outer layer of skin) [] Yes [x] No  Comments:  Ulcers  [] Yes [x] No  Comments:    SCARS: n/a    STEMMER SIGN: [x] positive [] negative    Stage of Lymphedema   [] Latency stage/Lymphangiopathy (Stage 0 / Prestage / Subclinical stage):   · No swelling  · Reduced transport capacity (TC)  · \"Normal\" tissue consistency  [] Stage 1 (reversible stage):  · Edema is soft (pitting)  · No secondary tissue changes  · Elevation reduces swelling  [x] Stage 2 (spontaneously irreversible stage)  · Lyphostatic fibrosis  · Hardening of the tissue (no pitting)  · Stemmer sign positive   · Frequent infections   [] Stage 3 (lymphostatic elephantiasis)  · Extreme increase in volume and tissue texture with typical skin changes (papillomas, deep skinfolds, etc.)  · Stemmer sign positive  · GIRTH MEASUREMENT  · (Tape on skin along anterior tibialis)  ·   Lower Extremity Right (cm) Left  (cm)   Date 4/14 4/14        cm  Widest at hip     cm at waist (at umbilicus)          Metatarsal heads 25.6 26.1   10Cm above inf aspect of lat mall 30.8 30.7   20Cm above inf aspect lat mall  40.0 39.6   30Cm above  inf aspect lat mall  50.5 49.5   40Cm above  inf aspect of lat mall 50.9 51.9   55Cm above  inf aspect of lat mall  61.9 61   65Cm above  inf aspect of lat mall 64.8 65.5                  Total Girth 324.5 324.3            Classification for Lymphedema: N/A because bilateral involvement  [] Mild: < 3 cm differential between affected limb and unaffected limb  [] Moderate:  3 - 5 cm differential between affected limb and unaffected limb   [] Severe:  5+ cm differential between affected limb and unaffected limb           Barriers to/and or personal factors that will affect rehab potential:              []Age  []Sex    []Smoker              []Motivation/Lack of Motivation                        [x]Co-Morbidities              []Cognitive Function, education/learning barriers              []Environmental, home barriers              []profession/work barriers  []past PT/medical experience  []other:    Falls Risk Assessment (30 days):   [x] Falls Risk assessed and no intervention required.   [] Falls Risk assessed and Patient requires intervention due to being higher risk   TUG score (>12s at risk):     [] Falls education provided, including         ASSESSMENT: presents with B LE lymphedema without known cause, primarily present below the knee, making it difficult for ADLs, ambulation up and down steps, and donning/doffing shoes and pants  Functional Impairments:   [x] Noted increased girth of B LE  [x] Noted decreased health of skin at B LE    []Decreased functional strength of   []Reduced balance/proprioceptive control    []other:  reduced functional ROM of    []other:  reduce functional strength of    []other: myofascial changes and pain at    [] Postural impairments:   []other:      Functional Activity Limitations (from functional questionnaire and intake)  [x]Reduced ability to use affected limb for ADLs/IADLs due to swelling causing symptoms of heaviness, skin tightness, pain  []Reduced ability to perform lifting, reaching, carrying tasks  [x]Reduced ability to wear his/her normal clothes/shoes due to swelling    [x]Reduced ability to tolerate prolonged functional positions  [x]Reduced ability or difficulty with changes of positions or transfers between positions  []Reduced ability to maintain good posture and demonstrate good body mechanics with sitting, bending, and lifting   [x]Reduced ability to sleep   [] Reduced ability or tolerance with driving and/or computer work   []Reduced ability to squat   []Reduced ability to forward bend   []Reduced ability to ambulate prolonged functional periods/distances/surfaces   [x]Reduced ability to ascend/descend stairs    []Reduced ability to tolerate any impact through UE or spine   []other:        Participation Restrictions   [x]Reduced participation in self care activities   [x]Reduced participation in home management activities   [x]Reduced participation in work activities   [x]Reduced participation in social activities. []Reduced participation in sport/recreational activities. Prognosis/Rehab Potential:      [x]Excellent   []Good    []Fair   []Poor    Tolerance of evaluation/treatment:    [x]Excellent   []Good    []Fair   []Poor     Physical Therapy Evaluation Complexity Justification  [x] A history of present problem with:  [] no personal factors and/or comorbidities that impact the plan of care;  [x]1-2 personal factors and/or comorbidities that impact the plan of care  []3 personal factors and/or comorbidities that impact the plan of care  [x] An examination of body systems using standardized tests and measures addressing any of the following: body structures and functions (impairments), activity limitations, and/or participation restrictions;:  [] a total of 1-2 or more elements   [x] a total of 3 or more elements   [] a total of 4 or more elements   [x] A clinical presentation with:  [x] stable and/or uncomplicated characteristics   [] evolving clinical presentation with changing characteristics  [] unstable and unpredictable characteristics;   [x] Clinical decision making of [x] low, [] moderate, [] high complexity using standardized patient assessment instrument and/or measurable assessment of functional outcome. [x] EVAL (LOW) 77757 (typically 15 minutes face-to-face)  [] EVAL (MOD) 68676 (typically 30 minutes face-to-face)  [] EVAL (HIGH) 79110 (typically 45 minutes face-to-face)  [] RE-EVAL     PLAN:  manual lymph drainage to ipsilateral axillary nodes; compression, HEP, education on lymphedema management, ROM/strength exercises to restore PLOF    Frequency/Duration:  2 days per week for 6 Weeks:  Interventions:  [x]  Compression to include multilayer compression bandaging and/or compression garments as appropriate  [x]  Manual therapy as indicated for B LE to include: manual lymph drainage, STM, ROM as appropriate.    [x]

## 2021-04-19 ENCOUNTER — HOSPITAL ENCOUNTER (OUTPATIENT)
Dept: PHYSICAL THERAPY | Age: 71
Setting detail: THERAPIES SERIES
Discharge: HOME | End: 2021-04-19
Payer: COMMERCIAL

## 2021-04-19 PROCEDURE — 97140 MANUAL THERAPY 1/> REGIONS: CPT

## 2021-04-19 PROCEDURE — 97530 THERAPEUTIC ACTIVITIES: CPT

## 2021-04-19 NOTE — FLOWSHEET NOTE
[facility] Physical Therapy  Phone: [phone]   Fax: [phone]    Physical Therapy Daily LYMPHEDEMA Treatment Note    Date:  2021    Patient Name:  Kip Hartman    :  1950  MRN: [id number]  Restrictions/Precautions:    Medical/Treatment Diagnosis Information:  · Diagnosis: Bilateral LE lymphedema     Insurance/Certification information:  PT Insurance Information: U[employer]---90/10 plan, medical necessity  Physician Information:  Referring Practitioner: Dr. Mika Pennington of care signed (Y/N): []  Yes [x]  No     Date of Patient follow up with Physician:     Functional scale[de-identified]  LLIS  raw score = 28/72 ; dysfunction = 38%    Progress Report: []  Yes  [x]  No     Date Range for reporting period:  Beginning  Ending    Progress report due (10 Rx/or 30 days whichever is less): visit #27 or      Recertification due (POC duration/ or 90 days whichever is less): visit #*     Visit # Insurance Allowable Auth required? Date Range    [place] []  Yes  [x]  No         Latex Allergy:  [x]NO      []YES  Preferred Language for Healthcare:   [x]English       []other:      Pain level:  3/10     SUBJECTIVE:  Returns feeling that her legs are less swollen already, shoe is fitting better. Compliant with use of tensoshape and into clinic with her bandages (Artiflex has not yet arrived).     OBJECTIVE: See eval      RESTRICTIONS/PRECAUTIONS:     Exercises/Interventions:     Therapeutic Exercises (93240) Resistance / level Sets/sec Reps Notes          Pt educated on exercises to stimulate lymphatic flow     AP X 20 Added to HEP; discussed mm pumping with compression                               Therapeutic Activities (73898)  (Dynamic activities such as compression, designed to improve functional performance)  30 min     Compression: trial tensoshape size   applied to        Multilayer compression bandaging to   Stockinette  Artiflex  Foam   8 cm low stretch compression bandage  10 cm low stretch compression bandage  cm low stretch compression bandage   cm low stretch compression bandage        X      X  x  BLE                                      Home Management  (providing pt education on safety procedures/instructions)  5 min     Pt educated on compression as follows:   how to appropriately apply and wear compression  how to maintain appropriate gradient compression  do not sleep with compression garment/tensoshape  signs and symptoms of constriction   when to remove compression      Pt educated on lymphedema prevention and management        Educated on self node clearance  x                                 Neuromuscular Re-ed (71188)                            Manual Intervention (41172)  30 min      See MLD flowchart below--B LE  x                                          Manual Lymph Drainage (MLD):  MLD to B LE, clearing along alternate pathway to ipsilateral axillary lymph nodes    Clear Nodes 10x each   Neck x   [address] x   Axilla x   Abdomen x   Groin x   Popliteal x2 x   Clear Alternate Pathway 10x each   Re-clear alternate pathway   x5 each position x   Location Ipsilateral axilla       Fluid Mobilization 10x each   Re-clear alternate pathway   x5 each position x   Shoulder bracing    Location        Protein Resorption 10x each   Location        Clear Foot/Ankle or Hand 10x each   Achilles x   Bilateral malleolus x   Fan the cards x   Clear dorsum x   Clear through web space x   Clear toes/fingers    Fluid mobilization x   Re-clear all positions  X5 each x           Modalities:     OTHER:     Pt education:   Pt educated on pathology and anatomy of etiology of lymphedema, condition precautions, indications for long term prognosis.     Pt was educated on diagnosis; prognosis; PT POC including MLD, compression (role of multilayer bandaging/ compression garments), lymphedema management/prevention of flare ups, role of exercise, HEP, lymphedema pump; proper ROM for normal function with   [x] LE / lumbar: self care, mobility, lifting and ambulation. [] UE / Cervical: self care, reaching, carrying, lifting, house/yardwork, driving, computer work. Modalities:  [] (23266) Vasopneumatic compression: Utilized vasopneumatic compression to decrease edema / swelling for the purpose of improving mobility and quad tone / recruitment which will allow for increased overall function including but not limited to self-care, transfers, ambulation, and ascending / descending stairs. Charges:  Timed Code Treatment Minutes: 65   Total Treatment Minutes: 65     [] EVAL - LOW (83796)   [] EVAL - MOD (99760)  [] EVAL - HIGH (11719)  [] RE-EVAL (40838)  [] JK(28652) x       [] Ionto  [] NMR (09427) x       [] Vaso  [x] Manual (77061) x   2    [] Ultrasound  [x] TA x   2     [] Mech Traction (66672)  [] Aquatic Therapy x     [] ES (un) (87343):   [] Home Management Training x  [] ES(attended) (45934)   [] Dry Needling 1-2 muscles (71151):  [] Dry Needling 3+ muscles (772492  [] Group:      [] Other:     GOALS:   GOALS:  Patient stated goal: \"get rid of swelling\"  [] Progressing: [] Met: [] Not Met: [] Adjusted    Therapist goals for Patient:   Short Term Goals: To be achieved in: 2 weeks  1. Independent in HEP and progression per patient tolerance, in order to prevent return of swelling   [] Progressing: [] Met: [] Not Met: [] Adjusted  2. Patient will have a decrease in swelling/pain to facilitate improvement in movement, function, and ADLs as indicated by improvement with LLIS. [] Progressing: [] Met: [] Not Met: [] Adjusted    Long Term Goals: To be achieved in: 6 weeks  1. Disability index score of 10% or less on the LLIS to assist with reaching prior level of function. [] Progressing: [] Met: [] Not Met: [] Adjusted    2.  Decrease swelling of B LE by at least 15 cm total limb volume so that pt can return to functional activities including walking up and down steps and getting in and out of bed without increased symptoms or restriction. [] Progressing: [] Met: [] Not Met: [] Adjusted      Overall Progression Towards Functional goals/ Treatment Progress Update:  [] Patient is progressing as expected towards functional goals listed. [] Progression is slowed due to complexities/Impairments listed. [] Progression has been slowed due to co-morbidities. [x] Plan just implemented, too soon to assess goals progression <30days   [] Goals require adjustment due to lack of progress  [] Patient is not progressing as expected and requires additional follow up with physician  [] Other    Persisting Functional Limitations/Impairments:  []Sleeping []Sitting               [x]Standing [x]Transfers        [x]Walking []Kneeling               []Stairs []Squatting / bending   [x]ADLs []Reaching  []Lifting  []Housework  []Driving []Job related tasks  []Sports/Recreation [x]Other:donning/doffing shoe and pants        ASSESSMENT:  See eval  Treatment/Activity Tolerance:  [] Patient able to complete tx [] Patient limited by fatigue  [] Patient limited by pain  [] Patient limited by other medical complications  [] Other:     Prognosis: [x] Good [] Fair  [] Poor    Patient Requires Follow-up: [x] Yes  [] No    Plan for next treatment session:   MLD, compression, HEP, pt education, lymph pump as appropriate, exercise to stimulate lymphatic flow    PLAN: See eval. PT 2x / week for 6    weeks. [x] Continue per plan of care [] Alter current plan (see comments)  [] Plan of care initiated [] Hold pending MD visit [] Discharge    Electronically signed by: Yamilex Beltran DPT [id number]    Note: If patient does not return for scheduled/ recommended follow up visits, this note will serve as a discharge from care along with most recent update on progress.

## 2021-04-23 ENCOUNTER — HOSPITAL ENCOUNTER (OUTPATIENT)
Dept: PHYSICAL THERAPY | Age: 71
Setting detail: THERAPIES SERIES
Discharge: HOME | End: 2021-04-23
Payer: COMMERCIAL

## 2021-04-23 PROCEDURE — 97530 THERAPEUTIC ACTIVITIES: CPT

## 2021-04-23 PROCEDURE — 97140 MANUAL THERAPY 1/> REGIONS: CPT

## 2021-04-23 NOTE — FLOWSHEET NOTE
[address] [facility] Physical Therapy  Phone: [phone]   Fax: [phone]    Physical Therapy Daily LYMPHEDEMA Treatment Note    Date:  2021    Patient Name:  Chucho Aleman    :  1950  MRN: [id number]  Restrictions/Precautions:    Medical/Treatment Diagnosis Information:  · Diagnosis: Bilateral LE lymphedema     Insurance/Certification information:  PT Insurance Information: UMR---90/10 plan, medical necessity  Physician Information:  Referring Practitioner: Dr. Flaherty Cough of care signed (Y/N): []  Yes [x]  No     Date of Patient follow up with Physician:     Functional scale[de-identified]  LLIS  raw score = 28/72 ; dysfunction = 38%    Progress Report: []  Yes  [x]  No     Date Range for reporting period:  Beginning  Ending    Progress report due (10 Rx/or 30 days whichever is less): visit #90 or      Recertification due (POC duration/ or 90 days whichever is less): visit #*     Visit # Insurance Allowable Auth required? Date Range   3/12 [place] []  Yes  [x]  No         Latex Allergy:  [x]NO      []YES  Preferred Language for Healthcare:   [x]English       []other:      Pain level:  3/10     SUBJECTIVE:  Patient feeling frustrated because she feels that her swelling increased since last visit.   Reports compliance with node clearance    OBJECTIVE: See eval      RESTRICTIONS/PRECAUTIONS:     Exercises/Interventions:     Therapeutic Exercises (17325) Resistance / level Sets/sec Reps Notes          Pt educated on exercises to stimulate lymphatic flow                                 Therapeutic Activities (22595)  (Dynamic activities such as compression, designed to improve functional performance)  30 min     Compression: trial tensoshape size   applied to        Multilayer compression bandaging to   Stockinette  Artiflex  Foam   8 cm low stretch compression bandage  10 cm low stretch compression bandage  cm low stretch compression bandage   cm low stretch compression bandage        X  X    X  x  BLE                                      Home Management  (providing pt education on safety procedures/instructions)  5 min     Pt educated on compression as follows:   how to appropriately apply and wear compression  how to maintain appropriate gradient compression  do not sleep with compression garment/tensoshape  signs and symptoms of constriction   when to remove compression      Pt educated on lymphedema prevention and management        Educated on self node clearance  x                                 Neuromuscular Re-ed (03497)                            Manual Intervention (91077)  30 min      See MLD flowchart below--B LE  x                                          Manual Lymph Drainage (MLD):  MLD to B LE, clearing along alternate pathway to ipsilateral axillary lymph nodes    Clear Nodes 10x each   Neck x   [address] x   Axilla x   Abdomen x   Groin x   Popliteal x2 x   Clear Alternate Pathway 10x each   Re-clear alternate pathway   x5 each position x   Location Ipsilateral axilla       Fluid Mobilization 10x each   Re-clear alternate pathway   x5 each position x   Shoulder bracing    Location        Protein Resorption 10x each   Location        Clear Foot/Ankle or Hand 10x each   Achilles x   Bilateral malleolus x   Fan the cards x   Clear dorsum x   Clear through web space x   Clear toes/fingers    Fluid mobilization x   Re-clear all positions  X5 each x           Modalities:     OTHER:     Pt education:   Pt educated on pathology and anatomy of etiology of lymphedema, condition precautions, indications for long term prognosis. Pt was educated on diagnosis; prognosis; PT POC including MLD, compression (role of multilayer bandaging/ compression garments), lymphedema management/prevention of flare ups, role of exercise, HEP, lymphedema pump; expectations for rehab.     All pt questions were answered and handouts provided    HEP instruction: 4/19:added self node clearance to HEP  4/23: added pathway clearance swiping technique to HEP; discussed self wrapping using comprilan; handouts provided and patient demonstrated understanding       Therapeutic Exercise and NMR EXR  [] (03022) Provided verbal/tactile cueing for activities related to strengthening, flexibility, endurance, ROM for improvements in  [] LE / Lumbar: LE, proximal hip, and core control with self care, mobility, lifting, ambulation. [] UE / Cervical: cervical, postural, scapular, scapulothoracic and UE control with self care, reaching, carrying, lifting, house/yardwork, driving, computer work.  [] (17061) Provided verbal/tactile cueing for activities related to improving balance, coordination, kinesthetic sense, posture, motor skill, proprioception to assist with   [] LE / lumbar: LE, proximal hip, and core control in self care, mobility, lifting, ambulation and eccentric single leg control. [] UE / cervical: cervical, scapular, scapulothoracic and UE control with self care, reaching, carrying, lifting, house/yardwork, driving, computer work.   [] (43098) Therapist is in constant attendance of 2 or more patients providing skilled therapy interventions, but not providing any significant amount of measurable one-on-one time to either patient, for improvements in  [] LE / lumbar: LE, proximal hip, and core control in self care, mobility, lifting, ambulation and eccentric single leg control. [] UE / cervical: cervical, scapular, scapulothoracic and UE control with self care, reaching, carrying, lifting, house/yardwork, driving, computer work.      NMR and Therapeutic Activities:    [x] (47008 or 91073) Provided verbal/tactile cueing for activities related to improving balance, coordination, kinesthetic sense, posture, motor skill, proprioception and motor activation to allow for proper function of   [x] LE: / Lumbar core, proximal hip and LE with self care and ADLs  [] UE / Cervical: cervical, postural, scapular, scapulothoracic and UE control with self care, carrying, lifting, driving, computer work.   [] (73273) Gait Re-education- Provided training and instruction to the patient for proper LE, core and proximal hip recruitment and positioning and eccentric body weight control with ambulation re-education including up and down stairs     Home Management Training / Self Care:  [] (30988) Provided self-care/home management training related to activities of daily living and compensatory training, and/or use of adaptive equipment for improvement with: ADLs and compensatory training, meal preparation, safety procedures and instruction in use of adaptive equipment, including bathing, grooming, dressing, personal hygiene, basic household cleaning and chores.      Home Exercise Program:    [x] (20885) Reviewed/Progressed HEP activities related to strengthening, flexibility, endurance, ROM of   [] LE / Lumbar: core, proximal hip and LE for functional self-care, mobility, lifting and ambulation/stair navigation   [] UE / Cervical: cervical, postural, scapular, scapulothoracic and UE control with self care, reaching, carrying, lifting, house/yardwork, driving, computer work  [] (98575)Reviewed/Progressed HEP activities related to improving balance, coordination, kinesthetic sense, posture, motor skill, proprioception of   [] LE: core, proximal hip and LE for self care, mobility, lifting, and ambulation/stair navigation    [] UE / Cervical: cervical, postural,  scapular, scapulothoracic and UE control with self care, reaching, carrying, lifting, house/yardwork, driving, computer work    Manual Treatments:  PROM / STM / Oscillations-Mobs:  G-I, II, III, IV (PA's, Inf., Post.)  [x] (99853) Provided manual therapy to mobilize LE, proximal hip and/or LS spine soft tissue/joints for the purpose of modulating pain, promoting relaxation,  increasing ROM, reducing/eliminating soft tissue swelling/inflammation/restriction, improving soft tissue extensibility and allowing for proper ROM for normal function with   [x] LE / lumbar: self care, mobility, lifting and ambulation. [] UE / Cervical: self care, reaching, carrying, lifting, house/yardwork, driving, computer work. Modalities:  [] (57831) Vasopneumatic compression: Utilized vasopneumatic compression to decrease edema / swelling for the purpose of improving mobility and quad tone / recruitment which will allow for increased overall function including but not limited to self-care, transfers, ambulation, and ascending / descending stairs. Charges:  Timed Code Treatment Minutes: 60   Total Treatment Minutes: 60     [] EVAL - LOW (38108)   [] EVAL - MOD (15859)  [] EVAL - HIGH (66044)  [] RE-EVAL (05798)  [] YZ(51929) x       [] Ionto  [] NMR (07650) x       [] Vaso  [x] Manual (19684) x   2    [] Ultrasound  [x] TA x   2     [] Mech Traction (93218)  [] Aquatic Therapy x     [] ES (un) (80892):   [] Home Management Training x  [] ES(attended) (16916)   [] Dry Needling 1-2 muscles (68344):  [] Dry Needling 3+ muscles (336911  [] Group:      [] Other:     GOALS:   GOALS:  Patient stated goal: \"get rid of swelling\"  [] Progressing: [] Met: [] Not Met: [] Adjusted    Therapist goals for Patient:   Short Term Goals: To be achieved in: 2 weeks  1. Independent in HEP and progression per patient tolerance, in order to prevent return of swelling   [] Progressing: [] Met: [] Not Met: [] Adjusted  2. Patient will have a decrease in swelling/pain to facilitate improvement in movement, function, and ADLs as indicated by improvement with LLIS. [] Progressing: [] Met: [] Not Met: [] Adjusted    Long Term Goals: To be achieved in: 6 weeks  1. Disability index score of 10% or less on the LLIS to assist with reaching prior level of function. [] Progressing: [] Met: [] Not Met: [] Adjusted    2.  Decrease swelling of B LE by at least 15 cm total limb volume so that pt can return to functional activities including walking up and down steps and getting in and out of bed without increased symptoms or restriction. [] Progressing: [] Met: [] Not Met: [] Adjusted      Overall Progression Towards Functional goals/ Treatment Progress Update:  [] Patient is progressing as expected towards functional goals listed. [] Progression is slowed due to complexities/Impairments listed. [] Progression has been slowed due to co-morbidities. [x] Plan just implemented, too soon to assess goals progression <30days   [] Goals require adjustment due to lack of progress  [] Patient is not progressing as expected and requires additional follow up with physician  [] Other    Persisting Functional Limitations/Impairments:  []Sleeping []Sitting               [x]Standing [x]Transfers        [x]Walking []Kneeling               []Stairs []Squatting / bending   [x]ADLs []Reaching  []Lifting  []Housework  []Driving []Job related tasks  []Sports/Recreation [x]Other:donning/doffing shoe and pants        ASSESSMENT:  See eval  Treatment/Activity Tolerance:  [] Patient able to complete tx [] Patient limited by fatigue  [] Patient limited by pain  [] Patient limited by other medical complications  [] Other:     Prognosis: [x] Good [] Fair  [] Poor    Patient Requires Follow-up: [x] Yes  [] No    Plan for next treatment session: changed most appointments to 90 minutes moving forward to allow for more complete treatment  MLD, compression, HEP, pt education, lymph pump as appropriate, exercise to stimulate lymphatic flow    PLAN: See eval. PT 2x / week for 6 weeks. [x] Continue per plan of care [] Alter current plan (see comments)  [] Plan of care initiated [] Hold pending MD visit [] Discharge    Electronically signed by: Orion Domínguez DPT [id number]    Note: If patient does not return for scheduled/ recommended follow up visits, this note will serve as a discharge from care along with most recent update on progress.

## 2021-04-26 ENCOUNTER — HOSPITAL ENCOUNTER (OUTPATIENT)
Dept: PHYSICAL THERAPY | Age: 71
Setting detail: THERAPIES SERIES
Discharge: HOME | End: 2021-04-26
Payer: COMMERCIAL

## 2021-04-26 PROCEDURE — 97530 THERAPEUTIC ACTIVITIES: CPT

## 2021-04-26 PROCEDURE — 97140 MANUAL THERAPY 1/> REGIONS: CPT

## 2021-04-26 NOTE — FLOWSHEET NOTE
[address] [facility] Physical Therapy  Phone: [phone]   Fax: [phone]    Physical Therapy Daily LYMPHEDEMA Treatment Note    Date:  2021    Patient Name:  Terry Woodard    :  1950  MRN: [id number]  Restrictions/Precautions:    Medical/Treatment Diagnosis Information:  · Diagnosis: Bilateral LE lymphedema     Insurance/Certification information:  PT Insurance Information: UMR---90/10 plan, medical necessity  Physician Information:  Referring Practitioner: Dr. Nath Locus of care signed (Y/N): []  Yes [x]  No     Date of Patient follow up with Physician:     Functional scale[de-identified]  LLIS  raw score = 28/72 ; dysfunction = 38%    Progress Report: []  Yes  [x]  No     Date Range for reporting period:  Beginning  Ending    Progress report due (10 Rx/or 30 days whichever is less): visit #87 or      Recertification due (POC duration/ or 90 days whichever is less): visit #*     Visit # Insurance Allowable Auth required? Date Range    [place]  Yes  [x]  No         Latex Allergy:  [x]NO      []YES  Preferred Language for Healthcare:   [x]English       []other:      Pain level:  10     SUBJECTIVE: attempted wrapping at home, but had some trouble getting it wrapped above her ankle. Twisted her right knee at the sink over the weekend, so is having difficulty walking today.     OBJECTIVE: See eval      RESTRICTIONS/PRECAUTIONS:     Exercises/Interventions:     Therapeutic Exercises (14800) Resistance / level Sets/sec Reps Notes          Pt educated on exercises to stimulate lymphatic flow                                 Therapeutic Activities (43395)  (Dynamic activities such as compression, designed to improve functional performance)  25 min     Compression: trial tensoshape size   applied to        Multilayer compression bandaging to   Stockinette  Artiflex  Foam   8 cm low stretch compression bandage  10 cm low stretch compression bandage  cm low stretch compression bandage   cm low stretch compression bandage        X  X    X  x  BLE                                      Home Management  (providing pt education on safety procedures/instructions)       Pt educated on compression as follows:   how to appropriately apply and wear compression  how to maintain appropriate gradient compression  do not sleep with compression garment/tensoshape  signs and symptoms of constriction   when to remove compression      Pt educated on lymphedema prevention and management        Educated on self node clearance                                   Neuromuscular Re-ed (04446)                            Manual Intervention (53438)  30 min      See MLD flowchart below--B LE  x                                          Manual Lymph Drainage (MLD):  MLD to B LE, clearing along alternate pathway to ipsilateral axillary lymph nodes    Clear Nodes 10x each   Neck x   [address] x   Axilla x   Abdomen x   Groin x   Popliteal x2 x   Clear Alternate Pathway 10x each   Re-clear alternate pathway   x5 each position x   Location Ipsilateral axilla       Fluid Mobilization 10x each   Re-clear alternate pathway   x5 each position x   Shoulder bracing    Location        Protein Resorption 10x each   Location        Clear Foot/Ankle or Hand 10x each   Achilles x   Bilateral malleolus x   Fan the cards x   Clear dorsum x   Clear through web space x   Clear toes/fingers    Fluid mobilization x   Re-clear all positions  X5 each x           Modalities:     OTHER:     Pt education:   Pt educated on pathology and anatomy of etiology of lymphedema, condition precautions, indications for long term prognosis. Pt was educated on diagnosis; prognosis; PT POC including MLD, compression (role of multilayer bandaging/ compression garments), lymphedema management/prevention of flare ups, role of exercise, HEP, lymphedema pump; expectations for rehab.     All pt questions were answered and handouts provided    HEP instruction: 4/19:added self node clearance to HEP  4/23: added pathway clearance swiping technique to HEP; discussed self wrapping using comprilan; handouts provided and patient demonstrated understanding       Therapeutic Exercise and NMR EXR  [] (75325) Provided verbal/tactile cueing for activities related to strengthening, flexibility, endurance, ROM for improvements in  [] LE / Lumbar: LE, proximal hip, and core control with self care, mobility, lifting, ambulation. [] UE / Cervical: cervical, postural, scapular, scapulothoracic and UE control with self care, reaching, carrying, lifting, house/yardwork, driving, computer work.  [] (04457) Provided verbal/tactile cueing for activities related to improving balance, coordination, kinesthetic sense, posture, motor skill, proprioception to assist with   [] LE / lumbar: LE, proximal hip, and core control in self care, mobility, lifting, ambulation and eccentric single leg control. [] UE / cervical: cervical, scapular, scapulothoracic and UE control with self care, reaching, carrying, lifting, house/yardwork, driving, computer work.   [] (57529) Therapist is in constant attendance of 2 or more patients providing skilled therapy interventions, but not providing any significant amount of measurable one-on-one time to either patient, for improvements in  [] LE / lumbar: LE, proximal hip, and core control in self care, mobility, lifting, ambulation and eccentric single leg control. [] UE / cervical: cervical, scapular, scapulothoracic and UE control with self care, reaching, carrying, lifting, house/yardwork, driving, computer work.      NMR and Therapeutic Activities:    [x] (59793 or 11051) Provided verbal/tactile cueing for activities related to improving balance, coordination, kinesthetic sense, posture, motor skill, proprioception and motor activation to allow for proper function of   [x] LE: / Lumbar core, proximal hip and LE with self care and ADLs  [] UE / Cervical: cervical, postural, scapular, scapulothoracic and UE control with self care, carrying, lifting, driving, computer work.   [] (84261) Gait Re-education- Provided training and instruction to the patient for proper LE, core and proximal hip recruitment and positioning and eccentric body weight control with ambulation re-education including up and down stairs     Home Management Training / Self Care:  [] (69484) Provided self-care/home management training related to activities of daily living and compensatory training, and/or use of adaptive equipment for improvement with: ADLs and compensatory training, meal preparation, safety procedures and instruction in use of adaptive equipment, including bathing, grooming, dressing, personal hygiene, basic household cleaning and chores.      Home Exercise Program:    [x] (68273) Reviewed/Progressed HEP activities related to strengthening, flexibility, endurance, ROM of   [] LE / Lumbar: core, proximal hip and LE for functional self-care, mobility, lifting and ambulation/stair navigation   [] UE / Cervical: cervical, postural, scapular, scapulothoracic and UE control with self care, reaching, carrying, lifting, house/yardwork, driving, computer work  [] (92547)Reviewed/Progressed HEP activities related to improving balance, coordination, kinesthetic sense, posture, motor skill, proprioception of   [] LE: core, proximal hip and LE for self care, mobility, lifting, and ambulation/stair navigation    [] UE / Cervical: cervical, postural,  scapular, scapulothoracic and UE control with self care, reaching, carrying, lifting, house/yardwork, driving, computer work    Manual Treatments:  PROM / STM / Oscillations-Mobs:  G-I, II, III, IV (PA's, Inf., Post.)  [x] (32299) Provided manual therapy to mobilize LE, proximal hip and/or LS spine soft tissue/joints for the purpose of modulating pain, promoting relaxation,  increasing ROM, reducing/eliminating soft tissue swelling/inflammation/restriction, improving soft tissue extensibility and allowing for proper ROM for normal function with   [x] LE / lumbar: self care, mobility, lifting and ambulation. [] UE / Cervical: self care, reaching, carrying, lifting, house/yardwork, driving, computer work. Modalities:  [] (72868) Vasopneumatic compression: Utilized vasopneumatic compression to decrease edema / swelling for the purpose of improving mobility and quad tone / recruitment which will allow for increased overall function including but not limited to self-care, transfers, ambulation, and ascending / descending stairs. Charges:  Timed Code Treatment Minutes: 55   Total Treatment Minutes: 55     [] EVAL - LOW (96024)   [] EVAL - MOD (15864)  [] EVAL - HIGH (10758)  [] RE-EVAL (11335)  [] YE(46609) x       [] Ionto  [] NMR (97701) x       [] Vaso  [x] Manual (56596) x   2    [] Ultrasound  [x] TA x   2     [] Mech Traction (12640)  [] Aquatic Therapy x     [] ES (un) (46739):   [] Home Management Training x  [] ES(attended) (79337)   [] Dry Needling 1-2 muscles (31111):  [] Dry Needling 3+ muscles (954926  [] Group:      [] Other:     GOALS:   GOALS:  Patient stated goal: \"get rid of swelling\"  [] Progressing: [] Met: [] Not Met: [] Adjusted    Therapist goals for Patient:   Short Term Goals: To be achieved in: 2 weeks  1. Independent in HEP and progression per patient tolerance, in order to prevent return of swelling   [] Progressing: [] Met: [] Not Met: [] Adjusted  2. Patient will have a decrease in swelling/pain to facilitate improvement in movement, function, and ADLs as indicated by improvement with LLIS. [] Progressing: [] Met: [] Not Met: [] Adjusted    Long Term Goals: To be achieved in: 6 weeks  1. Disability index score of 10% or less on the LLIS to assist with reaching prior level of function. [] Progressing: [] Met: [] Not Met: [] Adjusted    2.  Decrease swelling of B LE by at least 15 cm total limb volume so that pt can return to functional activities including walking up and down steps and getting in and out of bed without increased symptoms or restriction. [] Progressing: [] Met: [] Not Met: [] Adjusted      Overall Progression Towards Functional goals/ Treatment Progress Update:  [] Patient is progressing as expected towards functional goals listed. [] Progression is slowed due to complexities/Impairments listed. [] Progression has been slowed due to co-morbidities. [x] Plan just implemented, too soon to assess goals progression <30days   [] Goals require adjustment due to lack of progress  [] Patient is not progressing as expected and requires additional follow up with physician  [] Other    Persisting Functional Limitations/Impairments:  []Sleeping []Sitting               [x]Standing [x]Transfers        [x]Walking []Kneeling               []Stairs []Squatting / bending   [x]ADLs []Reaching  []Lifting  []Housework  []Driving []Job related tasks  []Sports/Recreation [x]Other:donning/doffing shoe and pants        ASSESSMENT:  See eval  Treatment/Activity Tolerance:  [] Patient able to complete tx [] Patient limited by fatigue  [] Patient limited by pain  [] Patient limited by other medical complications  [] Other:     Prognosis: [x] Good [] Fair  [] Poor    Patient Requires Follow-up: [x] Yes  [] No    Plan for next treatment session: changed most appointments to 90 minutes moving forward to allow for more complete treatment  MLD, compression, HEP, pt education, lymph pump as appropriate, exercise to stimulate lymphatic flow    PLAN: See eval. PT 2x / week for 6 weeks.    [x] Continue per plan of care [] Alter current plan (see comments)  [] Plan of care initiated [] Hold pending MD visit [] Discharge    Electronically signed by: Santi De La Rosa DPT [id number]    Note: If patient does not return for scheduled/ recommended follow up visits, this note will serve as a discharge from care along with most recent update on progress.

## 2021-04-30 ENCOUNTER — HOSPITAL ENCOUNTER (OUTPATIENT)
Dept: PHYSICAL THERAPY | Age: 71
Setting detail: THERAPIES SERIES
Discharge: HOME | End: 2021-04-30
Payer: COMMERCIAL

## 2021-04-30 PROCEDURE — 97016 VASOPNEUMATIC DEVICE THERAPY: CPT

## 2021-04-30 PROCEDURE — 97140 MANUAL THERAPY 1/> REGIONS: CPT

## 2021-04-30 PROCEDURE — 97530 THERAPEUTIC ACTIVITIES: CPT

## 2021-04-30 NOTE — FLOWSHEET NOTE
compression bandage        X  X    X  x  BLE                                      Home Management  (providing pt education on safety procedures/instructions)       Pt educated on compression as follows:   how to appropriately apply and wear compression  how to maintain appropriate gradient compression  do not sleep with compression garment/tensoshape  signs and symptoms of constriction   when to remove compression      Pt educated on lymphedema prevention and management        Educated on self node clearance                                   Neuromuscular Re-ed (61651)                            Manual Intervention ([id number])  60     See MLD flowchart below--B LE  x                                          Manual Lymph Drainage (MLD):  MLD to B LE, clearing along alternate pathway to ipsilateral axillary lymph nodes    Clear Nodes 10x each   Neck x   [address] x   Axilla x   Abdomen x   Groin x   Popliteal x2 x   Clear Alternate Pathway 10x each   Re-clear alternate pathway   x5 each position x   Location Ipsilateral axilla       Fluid Mobilization 10x each   Re-clear alternate pathway   x5 each position x   Shoulder bracing    Location        Protein Resorption 10x each   Location        Clear Foot/Ankle or Hand 10x each   Achilles x   Bilateral malleolus x   Fan the cards x   Clear dorsum x   Clear through web space x   Clear toes/fingers    Fluid mobilization x   Re-clear all positions  X5 each x           Modalities: vasopneumatic pump x 20 min B LE    OTHER: 4/30: advised patient to obtain 30-40 mm HG LE compression garment to the knee; tensoshape not providing enough compression between sessions and patient is struggling to apply wraps on her own. Pt education:   Pt educated on pathology and anatomy of etiology of lymphedema, condition precautions, indications for long term prognosis.     Pt was educated on diagnosis; prognosis; PT POC including MLD, compression (role of multilayer bandaging/ compression garments), lymphedema management/prevention of flare ups, role of exercise, HEP, lymphedema pump; expectations for rehab. All pt questions were answered and handouts provided    HEP instruction: 4/19:added self node clearance to HEP  4/23: added pathway clearance swiping technique to HEP; discussed self wrapping using comprilan; handouts provided and patient demonstrated understanding       Therapeutic Exercise and NMR EXR  [] (82492) Provided verbal/tactile cueing for activities related to strengthening, flexibility, endurance, ROM for improvements in  [] LE / Lumbar: LE, proximal hip, and core control with self care, mobility, lifting, ambulation. [] UE / Cervical: cervical, postural, scapular, scapulothoracic and UE control with self care, reaching, carrying, lifting, house/yardwork, driving, computer work.  [] (86532) Provided verbal/tactile cueing for activities related to improving balance, coordination, kinesthetic sense, posture, motor skill, proprioception to assist with   [] LE / lumbar: LE, proximal hip, and core control in self care, mobility, lifting, ambulation and eccentric single leg control. [] UE / cervical: cervical, scapular, scapulothoracic and UE control with self care, reaching, carrying, lifting, house/yardwork, driving, computer work.   [] (61471) Therapist is in constant attendance of 2 or more patients providing skilled therapy interventions, but not providing any significant amount of measurable one-on-one time to either patient, for improvements in  [] LE / lumbar: LE, proximal hip, and core control in self care, mobility, lifting, ambulation and eccentric single leg control. [] UE / cervical: cervical, scapular, scapulothoracic and UE control with self care, reaching, carrying, lifting, house/yardwork, driving, computer work.      NMR and Therapeutic Activities:    [x] (77351 or 26597) Provided verbal/tactile cueing for activities related to improving balance, coordination, kinesthetic sense, posture, motor skill, proprioception and motor activation to allow for proper function of   [x] LE: / Lumbar core, proximal hip and LE with self care and ADLs  [] UE / Cervical: cervical, postural, scapular, scapulothoracic and UE control with self care, carrying, lifting, driving, computer work.   [] (32669) Gait Re-education- Provided training and instruction to the patient for proper LE, core and proximal hip recruitment and positioning and eccentric body weight control with ambulation re-education including up and down stairs     Home Management Training / Self Care:  [] (46280) Provided self-care/home management training related to activities of daily living and compensatory training, and/or use of adaptive equipment for improvement with: ADLs and compensatory training, meal preparation, safety procedures and instruction in use of adaptive equipment, including bathing, grooming, dressing, personal hygiene, basic household cleaning and chores.      Home Exercise Program:    [x] (56193) Reviewed/Progressed HEP activities related to strengthening, flexibility, endurance, ROM of   [] LE / Lumbar: core, proximal hip and LE for functional self-care, mobility, lifting and ambulation/stair navigation   [] UE / Cervical: cervical, postural, scapular, scapulothoracic and UE control with self care, reaching, carrying, lifting, house/yardwork, driving, computer work  [] (69314)Reviewed/Progressed HEP activities related to improving balance, coordination, kinesthetic sense, posture, motor skill, proprioception of   [] LE: core, proximal hip and LE for self care, mobility, lifting, and ambulation/stair navigation    [] UE / Cervical: cervical, postural,  scapular, scapulothoracic and UE control with self care, reaching, carrying, lifting, house/yardwork, driving, computer work    Manual Treatments:  PROM / STM / Oscillations-Mobs:  G-I, II, III, IV (PA's, Inf., Post.)  [x] (90189) Provided manual therapy to mobilize LE, proximal hip and/or LS spine soft tissue/joints for the purpose of modulating pain, promoting relaxation,  increasing ROM, reducing/eliminating soft tissue swelling/inflammation/restriction, improving soft tissue extensibility and allowing for proper ROM for normal function with   [x] LE / lumbar: self care, mobility, lifting and ambulation. [] UE / Cervical: self care, reaching, carrying, lifting, house/yardwork, driving, computer work. Modalities:  [] (15150) Vasopneumatic compression: Utilized vasopneumatic compression to decrease edema / swelling for the purpose of improving mobility and quad tone / recruitment which will allow for increased overall function including but not limited to self-care, transfers, ambulation, and ascending / descending stairs. Charges:  Timed Code Treatment Minutes: 90   Total Treatment Minutes: 110     [] EVAL - LOW (24897)   [] EVAL - MOD (16586)  [] EVAL - HIGH (22150)  [] RE-EVAL (02181)  [] VD(09205) x       [] Ionto  [] NMR (41657) x       [x] Vaso  [x] Manual (04911) x   4    [] Ultrasound  [x] TA x   2     [] Mech Traction (81909)  [] Aquatic Therapy x     [] ES (un) (96934):   [] Home Management Training x  [] ES(attended) (22944)   [] Dry Needling 1-2 muscles (40552):  [] Dry Needling 3+ muscles (206436  [] Group:      [] Other:     GOALS:   GOALS:  Patient stated goal: \"get rid of swelling\"  [] Progressing: [] Met: [] Not Met: [] Adjusted    Therapist goals for Patient:   Short Term Goals: To be achieved in: 2 weeks  1. Independent in HEP and progression per patient tolerance, in order to prevent return of swelling   [] Progressing: [] Met: [] Not Met: [] Adjusted  2. Patient will have a decrease in swelling/pain to facilitate improvement in movement, function, and ADLs as indicated by improvement with LLIS. [] Progressing: [] Met: [] Not Met: [] Adjusted    Long Term Goals: To be achieved in: 6 weeks  1.  Disability index score of 10% or less on the LLIS to assist with reaching prior level of function. [] Progressing: [] Met: [] Not Met: [] Adjusted    2. Decrease swelling of B LE by at least 15 cm total limb volume so that pt can return to functional activities including walking up and down steps and getting in and out of bed without increased symptoms or restriction. [] Progressing: [] Met: [] Not Met: [] Adjusted      Overall Progression Towards Functional goals/ Treatment Progress Update:  [] Patient is progressing as expected towards functional goals listed. [] Progression is slowed due to complexities/Impairments listed. [] Progression has been slowed due to co-morbidities. [x] Plan just implemented, too soon to assess goals progression <30days   [] Goals require adjustment due to lack of progress  [] Patient is not progressing as expected and requires additional follow up with physician  [] Other    Persisting Functional Limitations/Impairments:  []Sleeping []Sitting               [x]Standing [x]Transfers        [x]Walking []Kneeling               []Stairs []Squatting / bending   [x]ADLs []Reaching  []Lifting  []Housework  []Driving []Job related tasks  []Sports/Recreation [x]Other:donning/doffing shoe and pants        ASSESSMENT:  See eval  Treatment/Activity Tolerance:  [] Patient able to complete tx [] Patient limited by fatigue  [] Patient limited by pain  [] Patient limited by other medical complications  [] Other:     Prognosis: [x] Good [] Fair  [] Poor    Patient Requires Follow-up: [x] Yes  [] No    Plan for next treatment session: changed most appointments to 90 minutes moving forward to allow for more complete treatment  MLD, compression, HEP, pt education, lymph pump as appropriate, exercise to stimulate lymphatic flow    PLAN: See eval. PT 2x / week for 6 weeks.    [x] Continue per plan of care [] Alter current plan (see comments)  [] Plan of care initiated [] Hold pending MD visit [] Discharge    Electronically signed by: Panchito Vora, [place] [id number]    Note: If patient does not return for scheduled/ recommended follow up visits, this note will serve as a discharge from care along with most recent update on progress.

## 2021-05-03 ENCOUNTER — APPOINTMENT (OUTPATIENT)
Dept: PHYSICAL THERAPY | Age: 71
End: 2021-05-03
Payer: COMMERCIAL

## 2021-05-05 ENCOUNTER — HOSPITAL ENCOUNTER (OUTPATIENT)
Dept: PHYSICAL THERAPY | Age: 71
Setting detail: THERAPIES SERIES
Discharge: HOME | End: 2021-05-05
Payer: COMMERCIAL

## 2021-05-05 PROCEDURE — 97530 THERAPEUTIC ACTIVITIES: CPT

## 2021-05-05 PROCEDURE — 97016 VASOPNEUMATIC DEVICE THERAPY: CPT

## 2021-05-05 PROCEDURE — 97140 MANUAL THERAPY 1/> REGIONS: CPT

## 2021-05-05 NOTE — FLOWSHEET NOTE
[facility]  Phone: [phone]   Fax: [phone]    Physical Therapy Daily LYMPHEDEMA Treatment Note    Date:  2021    Patient Name:  Loren Gatica    :  1950  MRN: [id number]  Restrictions/Precautions:    Medical/Treatment Diagnosis Information:  · Diagnosis: Bilateral LE lymphedema     Insurance/Certification information:  PT Insurance Information: [employer]---90/10 plan, medical necessity  Physician Information:  Referring Practitioner: Dr. Mckeon Reason of care signed (Y/N): []  Yes [x]  No     Date of Patient follow up with Physician:     Functional scale[de-identified]  LLIS  raw score = 28/72 ; dysfunction = 38%    Progress Report: []  Yes  [x]  No     Date Range for reporting period:  Beginning  Ending    Progress report due (10 Rx/or 30 days whichever is less): visit #12 or      Recertification due (POC duration/ or 90 days whichever is less): visit #*     Visit # Insurance Allowable Auth required? Date Range    [place] []  Yes  [x]  No         Latex Allergy:  [x]NO      []YES  Preferred Language for Healthcare:   [x]English       []other:      Pain level:  3/10     SUBJECTIVE: no new complaints. Says she feels her legs are a little better in terms of swelling. Has been trying to wrap at home, but it is challenging for her to get them on, especially to reach the foot. OBJECTIVE: : see measures taken below; bilateral ankles significantly swollen; no fibrotic tissue in either leg.  No erythema      RESTRICTIONS/PRECAUTIONS:     Exercises/Interventions:     Therapeutic Exercises (55231) Resistance / level Sets/sec Reps Notes          Pt educated on exercises to stimulate lymphatic flow                                 Therapeutic Activities (06206)  (Dynamic activities such as compression, designed to improve functional performance)  30 min     Compression: trial tensoshape size   applied to        Multilayer compression bandaging to   Stockinette  Artiflex  Foam   8 cm low stretch compression bandage  10 cm low stretch compression bandage  cm low stretch compression bandage   cm low stretch compression bandage        X  X    X  x  BLE   Re-measure both legs  10 min                                 Home Management  (providing pt education on safety procedures/instructions)       Pt educated on compression as follows:   how to appropriately apply and wear compression  how to maintain appropriate gradient compression  do not sleep with compression garment/tensoshape  signs and symptoms of constriction   when to remove compression      Pt educated on lymphedema prevention and management        Educated on self node clearance                                   Neuromuscular Re-ed (78757)                            Manual Intervention ([id number])  60     See MLD flowchart below--B LE  x                                          Manual Lymph Drainage (MLD):  MLD to B LE, clearing along alternate pathway to ipsilateral axillary lymph nodes    Clear Nodes 10x each   Neck x   [address] x   Axilla x   Abdomen x   Groin x   Popliteal x2 x   Clear Alternate Pathway 10x each   Re-clear alternate pathway   x5 each position x   Location Ipsilateral axilla       Fluid Mobilization 10x each   Re-clear alternate pathway   x5 each position x   Shoulder bracing    Location        Protein Resorption 10x each   Location        Clear Foot/Ankle or Hand 10x each   Achilles x   Bilateral malleolus x   Fan the cards x   Clear dorsum x   Clear through web space x   Clear toes/fingers    Fluid mobilization x   Re-clear all positions  X5 each x       GIRTH MEASUREMENT  (Tape on skin along medial aspect of LE)    Lower Extremity Right (cm) Left  (cm)   Date 5/5 5/5        cm  Widest at hip, measured in standing 769.2    cm at  umbilicus, measured in standing 118.7         Metatarsal heads 25.2 25.6   10Cm abovinferior aspect of lat mall  27.8 28.0   20Cm abovinferior aspect of lat mall     37.4 38.7   30Cm abovinferior aspect of lat mall   48.4 48.9   40Cm aboveinferior aspect of lat mall  49.0 50.3   55Cm above inferior aspect of lat mall   61.4 60.3   65Cm above inferior aspect of lat mall 65.0 65.9                  Total Girth        314.2cm    317.7    cm     total limb girth on 4/14 on R was 324.5 cm and on L was 324.3 cm    Modalities: 5/5: vasopneumatic pump x 20 min B LE    OTHER: 4/30: advised patient to obtain 30-40 mm HG LE compression garment to the knee; tensoshape not providing enough compression between sessions and patient is struggling to apply wraps on her own. Pt education:   Pt educated on pathology and anatomy of etiology of lymphedema, condition precautions, indications for long term prognosis. Pt was educated on diagnosis; prognosis; PT POC including MLD, compression (role of multilayer bandaging/ compression garments), lymphedema management/prevention of flare ups, role of exercise, HEP, lymphedema pump; expectations for rehab. All pt questions were answered and handouts provided    HEP instruction: 4/19:added self node clearance to HEP  4/23: added pathway clearance swiping technique to HEP; discussed self wrapping using comprilan; handouts provided and patient demonstrated understanding       Therapeutic Exercise and NMR EXR  [] (44480) Provided verbal/tactile cueing for activities related to strengthening, flexibility, endurance, ROM for improvements in  [] LE / Lumbar: LE, proximal hip, and core control with self care, mobility, lifting, ambulation.   [] UE / Cervical: cervical, postural, scapular, scapulothoracic and UE control with self care, reaching, carrying, lifting, house/yardwork, driving, computer work.  [] (02506) Provided verbal/tactile cueing for activities related to improving balance, coordination, kinesthetic sense, posture, motor skill, proprioception to assist with   [] LE / lumbar: LE, proximal hip, and core control in self care, strengthening, flexibility, endurance, ROM of   [] LE / Lumbar: core, proximal hip and LE for functional self-care, mobility, lifting and ambulation/stair navigation   [] UE / Cervical: cervical, postural, scapular, scapulothoracic and UE control with self care, reaching, carrying, lifting, house/yardwork, driving, computer work  [] (07166)Reviewed/Progressed HEP activities related to improving balance, coordination, kinesthetic sense, posture, motor skill, proprioception of   [] LE: core, proximal hip and LE for self care, mobility, lifting, and ambulation/stair navigation    [] UE / Cervical: cervical, postural,  scapular, scapulothoracic and UE control with self care, reaching, carrying, lifting, house/yardwork, driving, computer work    Manual Treatments:  PROM / STM / Oscillations-Mobs:  G-I, II, III, IV (PA's, Inf., Post.)  [x] (92740) Provided manual therapy to mobilize LE, proximal hip and/or LS spine soft tissue/joints for the purpose of modulating pain, promoting relaxation,  increasing ROM, reducing/eliminating soft tissue swelling/inflammation/restriction, improving soft tissue extensibility and allowing for proper ROM for normal function with   [x] LE / lumbar: self care, mobility, lifting and ambulation. [] UE / Cervical: self care, reaching, carrying, lifting, house/yardwork, driving, computer work. Modalities:  [] (77548) Vasopneumatic compression: Utilized vasopneumatic compression to decrease edema / swelling for the purpose of improving mobility and quad tone / recruitment which will allow for increased overall function including but not limited to self-care, transfers, ambulation, and ascending / descending stairs.        Charges:  Timed Code Treatment Minutes: 100   Total Treatment Minutes: 120     [] EVAL - LOW (74638)   [] EVAL - MOD (19779)  [] EVAL - HIGH (15291)  [] RE-EVAL (66224)  [] CY(19087) x       [] Ionto  [] NMR (17568) x       [x] Vaso  [x] Manual (61572) x   4    [] Ultrasound  [x] TA x   3     [] [facility] Traction (29935)  [] Aquatic Therapy x     [] ES (un) (10425):   [] Home Management Training x  [] ES(attended) (94283)   [] Dry Needling 1-2 muscles (18015):  [] Dry Needling 3+ muscles (819106  [] Group:      [] Other:     GOALS:   GOALS:  Patient stated goal: \"get rid of swelling\"  [] Progressing: [] Met: [] Not Met: [] Adjusted    Therapist goals for Patient:   Short Term Goals: To be achieved in: 2 weeks  1. Independent in HEP and progression per patient tolerance, in order to prevent return of swelling   [] Progressing: [] Met: [] Not Met: [] Adjusted  2. Patient will have a decrease in swelling/pain to facilitate improvement in movement, function, and ADLs as indicated by improvement with LLIS. [] Progressing: [] Met: [] Not Met: [] Adjusted    Long Term Goals: To be achieved in: 6 weeks  1. Disability index score of 10% or less on the LLIS to assist with reaching prior level of function. [] Progressing: [] Met: [] Not Met: [] Adjusted    2. Decrease swelling of B LE by at least 15 cm total limb volume so that pt can return to functional activities including walking up and down steps and getting in and out of bed without increased symptoms or restriction. [] Progressing: [] Met: [] Not Met: [] Adjusted      Overall Progression Towards Functional goals/ Treatment Progress Update:  [x] Patient is progressing as expected towards functional goals listed. [] Progression is slowed due to complexities/Impairments listed. [] Progression has been slowed due to co-morbidities.   [] Plan just implemented, too soon to assess goals progression <30days   [] Goals require adjustment due to lack of progress  [] Patient is not progressing as expected and requires additional follow up with physician  [] Other    Persisting Functional Limitations/Impairments:  []Sleeping []Sitting               [x]Standing [x]Transfers        [x]Walking []Kneeling               []Stairs []Squatting /

## 2021-05-07 ENCOUNTER — HOSPITAL ENCOUNTER (OUTPATIENT)
Dept: PHYSICAL THERAPY | Age: 71
Setting detail: THERAPIES SERIES
Discharge: HOME | End: 2021-05-07
Payer: COMMERCIAL

## 2021-05-07 PROCEDURE — 97140 MANUAL THERAPY 1/> REGIONS: CPT

## 2021-05-07 PROCEDURE — 97530 THERAPEUTIC ACTIVITIES: CPT

## 2021-05-07 PROCEDURE — 97016 VASOPNEUMATIC DEVICE THERAPY: CPT

## 2021-05-07 NOTE — FLOWSHEET NOTE
[address] [facility] Therapy  Phone: [phone]   Fax: [phone]    Physical Therapy Daily LYMPHEDEMA Treatment Note    Date:  2021    Patient Name:  Aarti Lo    :  1950  MRN: [id number]  Restrictions/Precautions:    Medical/Treatment Diagnosis Information:  · Diagnosis: Bilateral LE lymphedema     Insurance/Certification information:  PT Insurance Information: UMR---90/10 plan, medical necessity  Physician Information:  Referring Practitioner: Dr. Thompson Spikes of care signed (Y/N): []  Yes [x]  No     Date of Patient follow up with Physician:     Functional scale[de-identified]  LLIS  raw score = 28/72 ; dysfunction = 38%    Progress Report: []  Yes  [x]  No     Date Range for reporting period:  Beginning  Ending    Progress report due (10 Rx/or 30 days whichever is less): visit #54 or 0     Recertification due (POC duration/ or 90 days whichever is less): visit #*     Visit # [place] Allowable Auth required? Date [place]    [place]  [place]  [x]  No         Latex Allergy:  [x]NO      []YES  Preferred Language for Healthcare:   [x]English       []other:      Pain level:  3/10     SUBJECTIVE: needed script for LE compression. That was distributed today. Legs looking better today, especially at the ankles with improved compression from wraps. OBJECTIVE: : see measures taken below; bilateral ankles significantly swollen; no fibrotic tissue in either leg.  No erythema      RESTRICTIONS/PRECAUTIONS:     Exercises/Interventions:     Therapeutic Exercises (84870) Resistance / level Sets/sec Reps Notes          Pt educated on exercises to stimulate lymphatic flow                                 Therapeutic Activities (85086)  (Dynamic activities such as compression, designed to improve functional performance)  30 min     Compression: trial tensoshape size   applied to        Multilayer compression bandaging to   Stockinette  Artiflex  Foam   8 cm low stretch compression bandage  10 cm low stretch compression bandage  cm low stretch compression bandage   cm low stretch compression bandage        X  X    X  x  BLE   Re-measure both legs                                   Home Management  (providing pt education on safety procedures/instructions)       Pt educated on compression as follows:   how to appropriately apply and wear compression  how to maintain appropriate gradient compression  do not sleep with compression garment/tensoshape  signs and symptoms of constriction   when to remove compression      Pt educated on lymphedema prevention and management        Educated on self node clearance                                   Neuromuscular Re-ed (56410)                            Manual Intervention (14190)  60 min     See MLD flowchart below--B LE  x                                          Manual Lymph Drainage (MLD):  MLD to B LE, clearing along alternate pathway to ipsilateral axillary lymph nodes    Clear Nodes 10x each   Neck x   [address] x   Axilla x   Abdomen x   Groin x   Popliteal x2 x   Clear Alternate Pathway 10x each   Re-clear alternate pathway   x5 each position x   Location Ipsilateral axilla       Fluid Mobilization 10x each   Re-clear alternate pathway   x5 each position x   Shoulder bracing    Location        Protein Resorption 10x each   Location        Clear Foot/Ankle or Hand 10x each   Achilles x   Bilateral malleolus x   Fan the cards x   Clear dorsum x   Clear through web space x   Clear toes/fingers    Fluid mobilization x   Re-clear all positions  X5 each x       GIRTH MEASUREMENT  (Tape on skin along medial aspect of LE)    Lower Extremity Right (cm) Left  (cm)   Date 5/5 5/5        cm  Widest at hip, measured in standing 886.7    cm at  umbilicus, measured in standing 118.7         Metatarsal heads 25.2 25.6   10Cm abovinferior aspect of lat mall  27.8 28.0   20Cm abovinferior aspect of lat mall     37.4 38.7   30Cm abovinferior aspect of lat mall   48.4 48.9   40Cm aboveinferior aspect of lat mall  49.0 50.3   55Cm above inferior aspect of lat mall   61.4 60.3   65Cm above inferior aspect of lat mall 65.0 65.9                  Total Girth        314.2cm    317.7    cm     total limb girth on 4/14 on R was 324.5 cm and on L was 324.3 cm    Modalities: 5/7: vasopneumatic pump x 20 min B LE    OTHER: 4/30: advised patient to obtain 30-40 mm HG LE compression garment to the knee; tensoshape not providing enough compression between sessions and patient is struggling to apply wraps on her own. Pt education:   Pt educated on pathology and anatomy of etiology of lymphedema, condition precautions, indications for long term prognosis. Pt was educated on diagnosis; prognosis; PT POC including MLD, compression (role of multilayer bandaging/ compression garments), lymphedema management/prevention of flare ups, role of exercise, HEP, lymphedema pump; expectations for rehab. All pt questions were answered and handouts provided    HEP instruction: 4/19:added self node clearance to HEP  4/23: added pathway clearance swiping technique to HEP; discussed self wrapping using comprilan; handouts provided and patient demonstrated understanding       Therapeutic Exercise and NMR EXR  [] (83920) Provided verbal/tactile cueing for activities related to strengthening, flexibility, endurance, ROM for improvements in  [] LE / Lumbar: LE, proximal hip, and core control with self care, mobility, lifting, ambulation.   [] UE / Cervical: cervical, postural, scapular, scapulothoracic and UE control with self care, reaching, carrying, lifting, house/yardwork, driving, computer work.  [] (16130) Provided verbal/tactile cueing for activities related to improving balance, coordination, kinesthetic sense, posture, motor skill, proprioception to assist with   [] LE / lumbar: LE, proximal hip, and core control in self care, mobility, lifting, ambulation and endurance, ROM of   [] LE / Lumbar: core, proximal hip and LE for functional self-care, mobility, lifting and ambulation/stair navigation   [] UE / Cervical: cervical, postural, scapular, scapulothoracic and UE control with self care, reaching, carrying, lifting, house/yardwork, driving, computer work  [] (25637)Reviewed/Progressed HEP activities related to improving balance, coordination, kinesthetic sense, posture, motor skill, proprioception of   [] LE: core, proximal hip and LE for self care, mobility, lifting, and ambulation/stair navigation    [] UE / Cervical: cervical, postural,  scapular, scapulothoracic and UE control with self care, reaching, carrying, lifting, house/yardwork, driving, computer work    Manual Treatments:  PROM / STM / Oscillations-Mobs:  G-I, II, III, IV (PA's, Inf., Post.)  [x] (79264) Provided manual therapy to mobilize LE, proximal hip and/or LS spine soft tissue/joints for the purpose of modulating pain, promoting relaxation,  increasing ROM, reducing/eliminating soft tissue swelling/inflammation/restriction, improving soft tissue extensibility and allowing for proper ROM for normal function with   [x] LE / lumbar: self care, mobility, lifting and ambulation. [] UE / Cervical: self care, reaching, carrying, lifting, house/yardwork, driving, computer work. Modalities:  [] (17817) Vasopneumatic compression: Utilized vasopneumatic compression to decrease edema / swelling for the purpose of improving mobility and quad tone / recruitment which will allow for increased overall function including but not limited to self-care, transfers, ambulation, and ascending / descending stairs.        Charges:  Timed Code Treatment Minutes: 90   Total Treatment Minutes: 110     [] EVAL - LOW (43396)   [] EVAL - MOD (14059)  [] EVAL - HIGH (89942)  [] RE-EVAL (92343)  [] HS(52836) x       [] Ionto  [] NMR (98631) x       [x] Vaso  [x] Manual (87458) x   4    [] Ultrasound  [x] TA x   2     [] [facility] Traction (50907)  [] Aquatic Therapy x     [] ES (un) (08378):   [] Home Management Training x  [] ES(attended) (50402)   [] Dry Needling 1-2 muscles (13065):  [] Dry Needling 3+ muscles (878716  [] Group:      [] Other:     GOALS:   GOALS:  Patient stated goal: \"get rid of swelling\"  [] Progressing: [] Met: [] Not Met: [] Adjusted    Therapist goals for Patient:   Short Term Goals: To be achieved in: 2 weeks  1. Independent in HEP and progression per patient tolerance, in order to prevent return of swelling   [] Progressing: [] Met: [] Not Met: [] Adjusted  2. Patient will have a decrease in swelling/pain to facilitate improvement in movement, function, and ADLs as indicated by improvement with LLIS. [] Progressing: [] Met: [] Not Met: [] Adjusted    Long Term Goals: To be achieved in: 6 weeks  1. Disability index score of 10% or less on the LLIS to assist with reaching prior level of function. [] Progressing: [] Met: [] Not Met: [] Adjusted    2. Decrease swelling of B LE by at least 15 cm total limb volume so that pt can return to functional activities including walking up and down steps and getting in and out of bed without increased symptoms or restriction. [] Progressing: [] Met: [] Not Met: [] Adjusted      Overall Progression Towards Functional goals/ Treatment Progress Update:  [x] Patient is progressing as expected towards functional goals listed. [] Progression is slowed due to complexities/Impairments listed. [] Progression has been slowed due to co-morbidities.   [] Plan just implemented, too soon to assess goals progression <30days   [] Goals require adjustment due to lack of progress  [] Patient is not progressing as expected and requires additional follow up with physician  [] Other    Persisting Functional Limitations/Impairments:  []Sleeping []Sitting               [x]Standing [x]Transfers        [x]Walking []Kneeling               []Stairs []Squatting / bending   [x]ADLs []Reaching  []Lifting  []Housework  []Driving []Job related tasks  []Sports/Recreation [x]Other:donning/doffing shoe and pants        ASSESSMENT:  See eval  Treatment/Activity Tolerance:  [] Patient able to complete tx [] Patient limited by fatigue  [] Patient limited by pain  [] Patient limited by other medical complications  [] Other:     Prognosis: [x] Good [] Fair  [] Poor    Patient Requires Follow-up: [x] Yes  [] No    Plan for next treatment session: changed most appointments to 90 minutes moving forward to allow for more complete treatment; patient interested in obtaining pump for home  MLD, compression, HEP, pt education, lymph pump as appropriate, exercise to stimulate lymphatic flow    PLAN: See eval. PT 2x / week for 6 weeks. [x] Continue per plan of care [] Alter current plan (see comments)  [] Plan of care initiated [] Hold pending MD visit [] Discharge    Electronically signed by: Seamus Coats DPT [id number]    Note: If patient does not return for scheduled/ recommended follow up visits, this note will serve as a discharge from care along with most recent update on progress.

## 2021-05-07 NOTE — PROGRESS NOTES
Physical Therapy  Recommending that this patient obtain knee high compression stockings at 30-40 mmHG for bilateral LE to address lymphedema. Your co-signature on this note will act as the patient's script for the stockings so that she may obtain them from a local medical supply company. Thank you!   Beka Guerrero DPT [id number]

## 2021-05-10 ENCOUNTER — HOSPITAL ENCOUNTER (OUTPATIENT)
Dept: PHYSICAL THERAPY | Age: 71
Setting detail: THERAPIES SERIES
Discharge: HOME | End: 2021-05-10
Payer: COMMERCIAL

## 2021-05-10 PROCEDURE — 97530 THERAPEUTIC ACTIVITIES: CPT

## 2021-05-10 PROCEDURE — 97140 MANUAL THERAPY 1/> REGIONS: CPT

## 2021-05-10 NOTE — FLOWSHEET NOTE
[address] [facility]  Phone: [phone]   Fax: [phone]    Physical Therapy Daily LYMPHEDEMA Treatment Note    Date:  5/10/2021    Patient Name:  Kathie Harris    :  1950  MRN: [id number]  Restrictions/Precautions:    Medical/Treatment Diagnosis Information:  · Diagnosis: Bilateral LE lymphedema     Insurance/Certification information:  PT Insurance Information: UMR---90/10 plan, medical necessity  Physician Information:  Referring Practitioner: Dr. Dianne Lanza of care signed (Y/N): []  Yes [x]  No     Date of Patient follow up with Physician:     Functional scale[de-identified]  LLIS  raw score =  ; dysfunction = 38%    Progress Report: []  Yes  [x]  No     Date Range for reporting period:  Beginning  Ending    Progress report due (10 Rx/or 30 days whichever is less): visit #08 or      Recertification due (POC duration/ or 90 days whichever is less): visit #*     Visit # Insurance Allowable Auth required? Date [place]    [place]  [place]  [x]  No         Latex Allergy:  [x]NO      []YES  Preferred Language for Healthcare:   [x]English       []other:      Pain level:  3/10     SUBJECTIVE: hoping to get compression sleeves fit later this week. Has had some trouble getting an appointment scheduled. Returns with some more swelling today due to inability to wrap as well at home and the fact that she still does not have a good compression sleeve on her leg between wrapping. OBJECTIVE: : see measures taken below; bilateral ankles significantly swollen; no fibrotic tissue in either leg.  No erythema      RESTRICTIONS/PRECAUTIONS:     Exercises/Interventions:     Therapeutic Exercises (66842) Resistance / level Sets/sec Reps Notes          Pt educated on exercises to stimulate lymphatic flow                                 Therapeutic Activities (19681)  (Dynamic activities such as compression, designed to improve functional performance)  25 min heads 25.2 25.6   10Cm abovinferior aspect of lat mall  27.8 28.0   20Cm abovinferior aspect of lat mall     37.4 38.7   30Cm abovinferior aspect of lat mall   48.4 48.9   40Cm aboveinferior aspect of lat mall  49.0 50.3   55Cm above inferior aspect of lat mall   61.4 60.3   65Cm above inferior aspect of lat mall 65.0 65.9                  Total Girth        314.2cm    317.7    cm     total limb girth on 4/14 on R was 324.5 cm and on L was 324.3 cm    Modalities: 5/7: vasopneumatic pump x 20 min B LE    OTHER: 4/30: advised patient to obtain 30-40 mm HG LE compression garment to the knee; tensoshape not providing enough compression between sessions and patient is struggling to apply wraps on her own. Pt education:   Pt educated on pathology and anatomy of etiology of lymphedema, condition precautions, indications for long term prognosis. Pt was educated on diagnosis; prognosis; PT POC including MLD, compression (role of multilayer bandaging/ compression garments), lymphedema management/prevention of flare ups, role of exercise, HEP, lymphedema pump; expectations for rehab. All pt questions were answered and handouts provided    HEP instruction: 4/19:added self node clearance to HEP  4/23: added pathway clearance swiping technique to HEP; discussed self wrapping using comprilan; handouts provided and patient demonstrated understanding       Therapeutic Exercise and NMR EXR  [] (49455) Provided verbal/tactile cueing for activities related to strengthening, flexibility, endurance, ROM for improvements in  [] LE / Lumbar: LE, proximal hip, and core control with self care, mobility, lifting, ambulation.   [] UE / Cervical: cervical, postural, scapular, scapulothoracic and UE control with self care, reaching, carrying, lifting, house/yardwork, driving, computer work.  [] (38829) Provided verbal/tactile cueing for activities related to improving balance, coordination, kinesthetic sense, posture, motor skill, proprioception to assist with   [] LE / lumbar: LE, proximal hip, and core control in self care, mobility, lifting, ambulation and eccentric single leg control. [] UE / cervical: cervical, scapular, scapulothoracic and UE control with self care, reaching, carrying, lifting, house/yardwork, driving, computer work.   [] (49223) Therapist is in constant attendance of 2 or more patients providing skilled therapy interventions, but not providing any significant amount of measurable one-on-one time to either patient, for improvements in  [] LE / lumbar: LE, proximal hip, and core control in self care, mobility, lifting, ambulation and eccentric single leg control. [] UE / cervical: cervical, scapular, scapulothoracic and UE control with self care, reaching, carrying, lifting, house/yardwork, driving, computer work.      NMR and Therapeutic Activities:    [x] (71947 or 14219) Provided verbal/tactile cueing for activities related to improving balance, coordination, kinesthetic sense, posture, motor skill, proprioception and motor activation to allow for proper function of   [x] LE: / Lumbar core, proximal hip and LE with self care and ADLs  [] UE / Cervical: cervical, postural, scapular, scapulothoracic and UE control with self care, carrying, lifting, driving, computer work.   [] (62365) Gait Re-education- Provided training and instruction to the patient for proper LE, core and proximal hip recruitment and positioning and eccentric body weight control with ambulation re-education including up and down stairs     Home Management Training / Self Care:  [] (68161) Provided self-care/home management training related to activities of daily living and compensatory training, and/or use of adaptive equipment for improvement with: ADLs and compensatory training, meal preparation, safety procedures and instruction in use of adaptive equipment, including bathing, grooming, dressing, personal hygiene, basic household cleaning and chores. Home Exercise Program:    [x] (61989) Reviewed/Progressed HEP activities related to strengthening, flexibility, endurance, ROM of   [] LE / Lumbar: core, proximal hip and LE for functional self-care, mobility, lifting and ambulation/stair navigation   [] UE / Cervical: cervical, postural, scapular, scapulothoracic and UE control with self care, reaching, carrying, lifting, house/yardwork, driving, computer work  [] (33796)Reviewed/Progressed HEP activities related to improving balance, coordination, kinesthetic sense, posture, motor skill, proprioception of   [] LE: core, proximal hip and LE for self care, mobility, lifting, and ambulation/stair navigation    [] UE / Cervical: cervical, postural,  scapular, scapulothoracic and UE control with self care, reaching, carrying, lifting, house/yardwork, driving, computer work    Manual Treatments:  PROM / STM / Oscillations-Mobs:  G-I, II, III, IV (PA's, Inf., Post.)  [x] (48096) Provided manual therapy to mobilize LE, proximal hip and/or LS spine soft tissue/joints for the purpose of modulating pain, promoting relaxation,  increasing ROM, reducing/eliminating soft tissue swelling/inflammation/restriction, improving soft tissue extensibility and allowing for proper ROM for normal function with   [x] LE / lumbar: self care, mobility, lifting and ambulation. [] UE / Cervical: self care, reaching, carrying, lifting, house/yardwork, driving, computer work. Modalities:  [] (97891) Vasopneumatic compression: Utilized vasopneumatic compression to decrease edema / swelling for the purpose of improving mobility and quad tone / recruitment which will allow for increased overall function including but not limited to self-care, transfers, ambulation, and ascending / descending stairs.        Charges:  Timed Code Treatment Minutes: 85   Total Treatment Minutes: 85     [] EVAL - LOW (51522)   [] EVAL - MOD (70041)  [] EVAL - HIGH (02288)  [] [employer]ames  (02686)  [] KD(21016) x       [] Ionto  [] NMR (46688) x       [] Vaso  [x] Manual (85402) x   4    [] Ultrasound  [x] TA x   2     [] Mech Traction (24967)  [] Aquatic Therapy x     [] ES (un) (36108):   [] Home Management Training x  [] ES(attended) (00382)   [] Dry Needling 1-2 muscles (85911):  [] Dry Needling 3+ muscles (178402  [] Group:      [] Other:     GOALS:   GOALS:  Patient stated goal: \"get rid of swelling\"  [] Progressing: [] Met: [] Not Met: [] Adjusted    Therapist goals for Patient:   Short Term Goals: To be achieved in: 2 weeks  1. Independent in HEP and progression per patient tolerance, in order to prevent return of swelling   [] Progressing: [] Met: [] Not Met: [] Adjusted  2. Patient will have a decrease in swelling/pain to facilitate improvement in movement, function, and ADLs as indicated by improvement with LLIS. [] Progressing: [] Met: [] Not Met: [] Adjusted    Long Term Goals: To be achieved in: 6 weeks  1. Disability index score of 10% or less on the LLIS to assist with reaching prior level of function. [] Progressing: [] Met: [] Not Met: [] Adjusted    2. Decrease swelling of B LE by at least 15 cm total limb volume so that pt can return to functional activities including walking up and down steps and getting in and out of bed without increased symptoms or restriction. [] Progressing: [] Met: [] Not Met: [] Adjusted      Overall Progression Towards Functional goals/ Treatment Progress Update:  [x] Patient is progressing as expected towards functional goals listed. [] Progression is slowed due to complexities/Impairments listed. [] Progression has been slowed due to co-morbidities.   [] Plan just implemented, too soon to assess goals progression <30days   [] Goals require adjustment due to lack of progress  [] Patient is not progressing as expected and requires additional follow up with physician  [] Other    Persisting Functional Limitations/Impairments:  []Sleeping []Sitting               [x]Standing [x]Transfers        [x]Walking []Kneeling               []Stairs []Squatting / bending   [x]ADLs []Reaching  []Lifting  []Housework  []Driving []Job related tasks  []Sports/Recreation [x]Other:donning/doffing shoe and pants        ASSESSMENT:  See eval  Treatment/Activity Tolerance:  [] Patient able to complete tx [] Patient limited by fatigue  [] Patient limited by pain  [] Patient limited by other medical complications  [] Other:     Prognosis: [x] Good [] Fair  [] Poor    Patient Requires Follow-up: [x] Yes  [] No    Plan for next treatment session: changed most appointments to 90 minutes moving forward to allow for more complete treatment; patient interested in obtaining pump for home  MLD, compression, HEP, pt education, lymph pump as appropriate, exercise to stimulate lymphatic flow    PLAN: See eval. PT 2x / week for 6 weeks. [x] Continue per plan of care [] Alter current plan (see comments)  [] Plan of care initiated [] Hold pending MD visit [] Discharge    Electronically signed by: Merlene Frausto DPT [id number]    Note: If patient does not return for scheduled/ recommended follow up visits, this note will serve as a discharge from care along with most recent update on progress.

## 2021-05-14 ENCOUNTER — HOSPITAL ENCOUNTER (OUTPATIENT)
Dept: PHYSICAL THERAPY | Age: 71
Setting detail: THERAPIES SERIES
Discharge: HOME | End: 2021-05-14
Payer: COMMERCIAL

## 2021-05-14 PROCEDURE — 97140 MANUAL THERAPY 1/> REGIONS: CPT

## 2021-05-14 PROCEDURE — 97530 THERAPEUTIC ACTIVITIES: CPT

## 2021-05-14 NOTE — FLOWSHEET NOTE
[facility]  Phone: [phone]   Fax: [phone]    Physical Therapy Daily LYMPHEDEMA Treatment Note    Date:  2021    Patient Name:  Juana Solomon    :  1950  MRN: [id number]  Restrictions/Precautions:    Medical/Treatment Diagnosis Information:  · Diagnosis: Bilateral LE lymphedema     Insurance/Certification information:  PT Insurance Information: UMR---90/10 plan, medical necessity  Physician Information:  Referring Practitioner: Dr. Inez Roger of care signed (Y/N): []  Yes [x]  No     Date of Patient follow up with Physician:     Functional scale[de-identified]  LLIS  raw score = 28/72 ; dysfunction = 38%    Progress Report: []  Yes  [x]  No     Date Range for reporting period:  Beginning  Ending    Progress report due (10 Rx/or 30 days whichever is less): visit #87 or      Recertification due (POC duration/ or 90 days whichever is less): visit #*     Visit # Insurance Allowable Auth required? Date Range    [place] []  Yes  [x]  No         Latex Allergy:  [x]NO      []YES  Preferred Language for Healthcare:   [x]English       []other:      Pain level:  3/10     SUBJECTIVE: has obtained her compression garments and is wearing them faithfully at this point. Noticing improvement in fluid volume bilateral LE; will take measures next visit. OBJECTIVE: : see measures taken below; bilateral ankles significantly swollen; no fibrotic tissue in either leg.  No erythema      RESTRICTIONS/PRECAUTIONS:     Exercises/Interventions:     Therapeutic Exercises (43802) Resistance / level Sets/sec Reps Notes          Pt educated on exercises to stimulate lymphatic flow                                 Therapeutic Activities (02482)  (Dynamic activities such as compression, designed to improve functional performance)  25 min     Compression: trial tensoshape size   applied to        Multilayer compression bandaging to [place]  Artiflex  Foam   8 cm low stretch compression bandage  10 cm low stretch compression bandage  cm low stretch compression bandage   cm low stretch compression bandage        X  X    X  x  BLE   Re-measure both legs                                   Home Management  (providing pt education on safety procedures/instructions)       Pt educated on compression as follows:   how to appropriately apply and wear compression  how to maintain appropriate gradient compression  do not sleep with compression garment/tensoshape  signs and symptoms of constriction   when to remove compression      Pt educated on lymphedema prevention and management        Educated on self node clearance                                   Neuromuscular Re-ed (46229)                            Manual Intervention (00713)  60 min     See MLD flowchart below--B LE  x                                          Manual Lymph Drainage (MLD):  MLD to B LE, clearing along alternate pathway to ipsilateral axillary lymph nodes    Clear Nodes 10x each   Neck x   [address] x   Axilla x   Abdomen x   Groin x   Popliteal x2 x   Clear Alternate Pathway 10x each   Re-clear alternate pathway   x5 each position x   Location Ipsilateral axilla       Fluid Mobilization 10x each   Re-clear alternate pathway   x5 each position x   Shoulder bracing    Location        Protein Resorption 10x each   Location        Clear Foot/Ankle or Hand 10x each   Achilles x   Bilateral malleolus x   Fan the cards x   Clear dorsum x   Clear through web space x   Clear toes/fingers    Fluid mobilization x   Re-clear all positions  X5 each x       GIRTH MEASUREMENT  (Tape on skin along medial aspect of LE)    Lower Extremity Right (cm) Left  (cm)   Date 5/5 5/5        cm  Widest at hip, measured in standing 505.0    cm at  umbilicus, measured in standing 118.7         Metatarsal heads 25.2 25.6   10Cm abovinferior aspect of lat mall  27.8 28.0   20Cm abovinferior aspect of lat mall     37.4 38.7   30Cm abovinferior aspect of lat mall   48.4 48.9   40Cm aboveinferior aspect of lat mall  49.0 50.3   55Cm above inferior aspect of lat mall   61.4 60.3   65Cm above inferior aspect of lat mall 65.0 65.9                  Total Girth        314.2cm    317.7    cm     total limb girth on 4/14 on R was 324.5 cm and on L was 324.3 cm    Modalities: 5/7: vasopneumatic pump x 20 min B LE    OTHER: 4/30: advised patient to obtain 30-40 mm HG LE compression garment to the knee; tensoshape not providing enough compression between sessions and patient is struggling to apply wraps on her own. Pt education:   Pt educated on pathology and anatomy of etiology of lymphedema, condition precautions, indications for long term prognosis. Pt was educated on diagnosis; prognosis; PT POC including MLD, compression (role of multilayer bandaging/ compression garments), lymphedema management/prevention of flare ups, role of exercise, HEP, lymphedema pump; expectations for rehab. All pt questions were answered and handouts provided    HEP instruction: 4/19:added self node clearance to HEP  4/23: added pathway clearance swiping technique to HEP; discussed self wrapping using comprilan; handouts provided and patient demonstrated understanding       Therapeutic Exercise and NMR EXR  [] (27202) Provided verbal/tactile cueing for activities related to strengthening, flexibility, endurance, ROM for improvements in  [] LE / Lumbar: LE, proximal hip, and core control with self care, mobility, lifting, ambulation.   [] UE / Cervical: cervical, postural, scapular, scapulothoracic and UE control with self care, reaching, carrying, lifting, house/yardwork, driving, computer work.  [] (75496) Provided verbal/tactile cueing for activities related to improving balance, coordination, kinesthetic sense, posture, motor skill, proprioception to assist with   [] LE / lumbar: LE, proximal hip, and core control in self care, mobility, lifting, ambulation and eccentric single leg control. [] UE / cervical: cervical, scapular, scapulothoracic and UE control with self care, reaching, carrying, lifting, house/yardwork, driving, computer work.   [] (09742) Therapist is in constant attendance of 2 or more patients providing skilled therapy interventions, but not providing any significant amount of measurable one-on-one time to either patient, for improvements in  [] LE / lumbar: LE, proximal hip, and core control in self care, mobility, lifting, ambulation and eccentric single leg control. [] UE / cervical: cervical, scapular, scapulothoracic and UE control with self care, reaching, carrying, lifting, house/yardwork, driving, computer work. NMR and Therapeutic Activities:    [x] (38559 or 18891) Provided verbal/tactile cueing for activities related to improving balance, coordination, kinesthetic sense, posture, motor skill, proprioception and motor activation to allow for proper function of   [x] LE: / Lumbar core, proximal hip and LE with self care and ADLs  [] UE / Cervical: cervical, postural, scapular, scapulothoracic and UE control with self care, carrying, lifting, driving, computer work.   [] (14074) Gait Re-education- Provided training and instruction to the patient for proper LE, core and proximal hip recruitment and positioning and eccentric body weight control with ambulation re-education including up and down stairs     Home Management Training / Self Care:  [] (53460) Provided self-care/home management training related to activities of daily living and compensatory training, and/or use of adaptive equipment for improvement with: ADLs and compensatory training, meal preparation, safety procedures and instruction in use of adaptive equipment, including bathing, grooming, dressing, personal hygiene, basic household cleaning and chores.      Home Exercise Program:    [x] (54840) Reviewed/Progressed HEP activities related to Ultrasound  [x] TA x   2     [] [facility] Traction (19421)  [] Aquatic Therapy x     [] ES (un) (02291):   [] Home Management Training x  [] ES(attended) (75138)   [] Dry Needling 1-2 muscles (68635):  [] Dry Needling 3+ muscles (894269  [] Group:      [] Other:     GOALS:   GOALS:  Patient stated goal: \"get rid of swelling\"  [] Progressing: [] Met: [] Not Met: [] Adjusted    Therapist goals for Patient:   Short Term Goals: To be achieved in: 2 weeks  1. Independent in HEP and progression per patient tolerance, in order to prevent return of swelling   [] Progressing: [] Met: [] Not Met: [] Adjusted  2. Patient will have a decrease in swelling/pain to facilitate improvement in movement, function, and ADLs as indicated by improvement with LLIS. [] Progressing: [] Met: [] Not Met: [] Adjusted    Long Term Goals: To be achieved in: 6 weeks  1. Disability index score of 10% or less on the LLIS to assist with reaching prior level of function. [] Progressing: [] Met: [] Not Met: [] Adjusted    2. Decrease swelling of B LE by at least 15 cm total limb volume so that pt can return to functional activities including walking up and down steps and getting in and out of bed without increased symptoms or restriction. [] Progressing: [] Met: [] Not Met: [] Adjusted      Overall Progression Towards Functional goals/ Treatment Progress Update:  [x] Patient is progressing as expected towards functional goals listed. [] Progression is slowed due to complexities/Impairments listed. [] Progression has been slowed due to co-morbidities.   [] Plan just implemented, too soon to assess goals progression <30days   [] Goals require adjustment due to lack of progress  [] Patient is not progressing as expected and requires additional follow up with physician  [] Other    Persisting Functional Limitations/Impairments:  []Sleeping []Sitting               [x]Standing [x]Transfers        [x]Walking []Kneeling               []Stairs []Squatting / bending   [x]ADLs []Reaching  []Lifting  []Housework  []Driving []Job related tasks  []Sports/Recreation [x]Other:donning/doffing shoe and pants        ASSESSMENT:  See eval  Treatment/Activity Tolerance:  [] Patient able to complete tx [] Patient limited by fatigue  [] Patient limited by pain  [] Patient limited by other medical complications  [] Other:     Prognosis: [x] Good [] Fair  [] Poor    Patient Requires Follow-up: [x] Yes  [] No    Plan for next treatment session: continue MLD and multi-layer compression for B LE; have contacted Corrine from [employer] regarding home pump  MLD, compression, HEP, pt education, lymph pump as appropriate, exercise to stimulate lymphatic flow    PLAN: See eval. PT 2x / week for 6 weeks. [x] Continue per plan of care [] Alter current plan (see comments)  [] Plan of care initiated [] Hold pending MD visit [] Discharge    Electronically signed by: Darlene Richter DPT [id number]    Note: If patient does not return for scheduled/ recommended follow up visits, this note will serve as a discharge from care along with most recent update on progress.

## 2021-05-17 ENCOUNTER — HOSPITAL ENCOUNTER (OUTPATIENT)
Dept: PHYSICAL THERAPY | Age: 71
Setting detail: THERAPIES SERIES
Discharge: HOME | End: 2021-05-17
Payer: COMMERCIAL

## 2021-05-17 PROCEDURE — 97140 MANUAL THERAPY 1/> REGIONS: CPT

## 2021-05-17 PROCEDURE — 97530 THERAPEUTIC ACTIVITIES: CPT

## 2021-05-17 NOTE — FLOWSHEET NOTE
[address] [facility]  Phone: [phone]   Fax: [phone]    Physical Therapy Daily LYMPHEDEMA Treatment Note    Physical Therapy Re-Certification Plan of Care    Dear Dr. Santos Carrizales  ,    We had the pleasure of treating the following patient for physical therapy services at [facility]. A summary of our findings can be found in the updated assessment below. This includes our plan of care. If you have any questions or concerns regarding these findings, please do not hesitate to contact me at the office phone number checked above. Thank you for the referral.     Physician Signature:________________________________Date:__________________  By signing above (or electronic signature), therapist's plan is approved by physician      Functional Outcome: Lymphedema Life Impact Scale: :                                                     Overall Response to Treatment:   [x]Patient is responding well to treatment and improvement is noted with regards  to goals   []Patient should continue to improve in reasonable time if they continue HEP   []Patient has plateaued and is no longer responding to skilled PT intervention    []Patient is getting worse and would benefit from return to referring MD   []Patient unable to adhere to initial POC   []Other:     Date range of Visits:  to   Total Visits: 10    Recommendation:    [x]Continue PT 2x / wk for 4 weeks.                []Hold PT, pending MD visit        Date:  2021    Patient Name:  Prakash Connelly    :  1950  MRN: [id number]  Restrictions/Precautions:    Medical/Treatment Diagnosis Information:  · Diagnosis: Bilateral LE lymphedema     Insurance/Certification information:  PT Insurance Information: UMR---90/10 plan, medical necessity  Physician Information:  Referring Practitioner: Dr. Sandra Miguel of care signed (Y/N): []  Yes [x] compression      Pt educated on lymphedema prevention and management        Educated on self node clearance                                   Neuromuscular Re-ed (39748)                            Manual Intervention ([id number])  60 min     See MLD flowchart below--B LE  x                                          Manual Lymph Drainage (MLD):  MLD to B LE, clearing along alternate pathway to ipsilateral axillary lymph nodes    Clear Nodes 10x each   Neck x   [address] x   Axilla x   Abdomen x   Groin x   Popliteal x2 x   Clear Alternate Pathway 10x each   Re-clear alternate pathway   x5 each position x   Location Ipsilateral axilla       Fluid Mobilization 10x each   Re-clear alternate pathway   x5 each position x   Shoulder bracing    Location        Protein Resorption 10x each   Location        Clear Foot/Ankle or Hand 10x each   Achilles x   Bilateral malleolus x   Fan the cards x   Clear dorsum x   Clear through web space x   Clear toes/fingers    Fluid mobilization x   Re-clear all positions  X5 each x       GIRTH MEASUREMENT  (Tape on skin along medial aspect of LE)    Lower Extremity Right (cm) Left  (cm)   Date 5/17 5/17        cm  Widest at hip, measured in standing 725.1    cm at  umbilicus, measured in standing 118.7         Metatarsal heads 24.6 25.5   10Cm above inferior aspect of lat mall  27.5 27.2   20Cm abovinferior aspect of lat mall     39.9 39.5   30Cm abovinferior aspect of lat mall   49.6 48.7   40Cm aboveinferior aspect of lat mall  49.9 50.0   55Cm above inferior aspect of lat mall   62.3 61.5   65Cm above inferior aspect of lat mall 66.5 67.0                  Total Girth        320.3 cm    319.4 cm     total limb girth on 4/14 on R was 324.5 cm and on L was 324.3 cm  Total limb girth on 5/5 on R was 314.2 cm and on L was 317.7    Modalities: 5/7: vasopneumatic pump x 20 min B LE    OTHER: 4/30: advised patient to obtain 30-40 mm HG LE compression garment to the knee; tensoshape not providing enough compression between sessions and patient is struggling to apply wraps on her own. Pt education:   Pt educated on pathology and anatomy of etiology of lymphedema, condition precautions, indications for long term prognosis. Pt was educated on diagnosis; prognosis; PT POC including MLD, compression (role of multilayer bandaging/ compression garments), lymphedema management/prevention of flare ups, role of exercise, HEP, lymphedema pump; expectations for rehab. All pt questions were answered and handouts provided    HEP instruction: 4/19:added self node clearance to HEP  4/23: added pathway clearance swiping technique to HEP; discussed self wrapping using comprilan; handouts provided and patient demonstrated understanding       Therapeutic Exercise and NMR EXR  [] (18090) Provided verbal/tactile cueing for activities related to strengthening, flexibility, endurance, ROM for improvements in  [] LE / Lumbar: LE, proximal hip, and core control with self care, mobility, lifting, ambulation. [] UE / Cervical: cervical, postural, scapular, scapulothoracic and UE control with self care, reaching, carrying, lifting, house/yardwork, driving, computer work.  [] (27907) Provided verbal/tactile cueing for activities related to improving balance, coordination, kinesthetic sense, posture, motor skill, proprioception to assist with   [] LE / lumbar: LE, proximal hip, and core control in self care, mobility, lifting, ambulation and eccentric single leg control.    [] UE / cervical: cervical, scapular, scapulothoracic and UE control with self care, reaching, carrying, lifting, house/yardwork, driving, computer work.   [] (24650) Therapist is in constant attendance of 2 or more patients providing skilled therapy interventions, but not providing any significant amount of measurable one-on-one time to either patient, for improvements in  [] LE / lumbar: LE, proximal hip, and core control in self care, mobility, lifting, ambulation and eccentric single leg control. [] UE / cervical: cervical, scapular, scapulothoracic and UE control with self care, reaching, carrying, lifting, house/yardwork, driving, computer work. NMR and Therapeutic Activities:    [x] (71319 or 97040) Provided verbal/tactile cueing for activities related to improving balance, coordination, kinesthetic sense, posture, motor skill, proprioception and motor activation to allow for proper function of   [x] LE: / Lumbar core, proximal hip and LE with self care and ADLs  [] UE / Cervical: cervical, postural, scapular, scapulothoracic and UE control with self care, carrying, lifting, driving, computer work.   [] (16974) Gait Re-education- Provided training and instruction to the patient for proper LE, core and proximal hip recruitment and positioning and eccentric body weight control with ambulation re-education including up and down stairs     Home Management Training / Self Care:  [] (85732) Provided self-care/home management training related to activities of daily living and compensatory training, and/or use of adaptive equipment for improvement with: ADLs and compensatory training, meal preparation, safety procedures and instruction in use of adaptive equipment, including bathing, grooming, dressing, personal hygiene, basic household cleaning and chores.      Home Exercise Program:    [x] (40422) Reviewed/Progressed HEP activities related to strengthening, flexibility, endurance, ROM of   [] LE / Lumbar: core, proximal hip and LE for functional self-care, mobility, lifting and ambulation/stair navigation   [] UE / Cervical: cervical, postural, scapular, scapulothoracic and UE control with self care, reaching, carrying, lifting, house/yardwork, driving, computer work  [] (09274)Reviewed/Progressed HEP activities related to improving balance, coordination, kinesthetic sense, posture, motor skill, proprioception of   [] LE: core, proximal hip and LE for self care, mobility, lifting, and ambulation/stair navigation    [] UE / Cervical: cervical, postural,  scapular, scapulothoracic and UE control with self care, reaching, carrying, lifting, house/yardwork, driving, computer work    Manual Treatments:  PROM / STM / Oscillations-Mobs:  G-I, II, III, IV (PA's, Inf., Post.)  [x] (13491) Provided manual therapy to mobilize LE, proximal hip and/or LS spine soft tissue/joints for the purpose of modulating pain, promoting relaxation,  increasing ROM, reducing/eliminating soft tissue swelling/inflammation/restriction, improving soft tissue extensibility and allowing for proper ROM for normal function with   [x] LE / lumbar: self care, mobility, lifting and ambulation. [] UE / Cervical: self care, reaching, carrying, lifting, house/yardwork, driving, computer work. Modalities:  [] (86286) Vasopneumatic compression: Utilized vasopneumatic compression to decrease edema / swelling for the purpose of improving mobility and quad tone / recruitment which will allow for increased overall function including but not limited to self-care, transfers, ambulation, and ascending / descending stairs. Charges:  Timed Code Treatment Minutes: 90   Total Treatment Minutes: 90     [] EVAL - LOW (83815)   [] EVAL - MOD (17742)  [] EVAL - HIGH (75788)  [] RE-EVAL (79891)  [] JX(69816) x       [] Ionto  [] NMR (52548) x       [] Vaso  [x] Manual (47096) x   4    [] Ultrasound  [x] TA x   2     [] Mech Traction (20279)  [] Aquatic Therapy x     [] ES (un) (37015):   [] Home Management Training x  [] ES(attended) (89523)   [] Dry Needling 1-2 muscles (82696):  [] Dry Needling 3+ muscles (436485  [] Group:      [] Other:     GOALS:   GOALS:  Patient stated goal: \"get rid of swelling\"  [x] Progressing: [] Met: [] Not Met: [] Adjusted    Therapist goals for Patient:   Short Term Goals: To be achieved in: 2 weeks  1.  Independent in HEP and progression per patient tolerance, in order to prevent return of swelling   [] Progressing: [x] Met: [] Not Met: [] Adjusted  2. Patient will have a decrease in swelling/pain to facilitate improvement in movement, function, and ADLs as indicated by improvement with LLIS. [] Progressing: [x] Met: [] Not Met: [] Adjusted    Long Term Goals: To be achieved in: 6 weeks  1. Disability index score of 10% or less on the LLIS to assist with reaching prior level of function. [x] Progressing: [] Met: [] Not Met: [] Adjusted    2. Decrease swelling of B LE by at least 15 cm total limb volume so that pt can return to functional activities including walking up and down steps and getting in and out of bed without increased symptoms or restriction. [x] Progressing: [] Met: [] Not Met: [] Adjusted      Overall Progression Towards Functional goals/ Treatment Progress Update:  [x] Patient is progressing as expected towards functional goals listed. [] Progression is slowed due to complexities/Impairments listed. [] Progression has been slowed due to co-morbidities. [] Plan just implemented, too soon to assess goals progression <30days   [] Goals require adjustment due to lack of progress  [] Patient is not progressing as expected and requires additional follow up with physician  [] Other    Persisting Functional Limitations/Impairments:  []Sleeping []Sitting               [x]Standing [x]Transfers        [x]Walking []Kneeling               []Stairs []Squatting / bending   [x]ADLs []Reaching  []Lifting  []Housework  []Driving []Job related tasks  []Sports/Recreation [x]Other:donning/doffing shoe and pants        ASSESSMENT:  Making nice progress towards goals, but LE swelling still not yet fully stabilized. Working on getting patient a home pump to assist with that. Plan to continue MLD and compressive wrapping to address the swelling.   Treatment/Activity Tolerance:  [x] Patient able to complete tx [] Patient limited by fatigue  [] Patient limited by pain  [] Patient limited by other medical complications  [] Other:     Prognosis: [x] Good [] Fair  [] Poor    Patient Requires Follow-up: [x] Yes  [] No    Plan for next treatment session: continue MLD and multi-layer compression for B LE; have contacted Corrine from [employer] regarding home pump  MLD, compression, HEP, pt education, lymph pump as appropriate, exercise to stimulate lymphatic flow    PLAN:requesting additional 2 x week for 4 weeks. [x] Continue per plan of care [] Alter current plan (see comments)  [] Plan of care initiated [] Hold pending MD visit [] Discharge    Electronically signed by: Senthil Rabago DPT [id number]    Note: If patient does not return for scheduled/ recommended follow up visits, this note will serve as a discharge from care along with most recent update on progress.

## 2021-05-19 ENCOUNTER — HOSPITAL ENCOUNTER (OUTPATIENT)
Dept: PHYSICAL THERAPY | Age: 71
Setting detail: THERAPIES SERIES
Discharge: HOME | End: 2021-05-19
Payer: COMMERCIAL

## 2021-05-19 PROCEDURE — 97530 THERAPEUTIC ACTIVITIES: CPT

## 2021-05-19 PROCEDURE — 97140 MANUAL THERAPY 1/> REGIONS: CPT

## 2021-05-19 PROCEDURE — 97016 VASOPNEUMATIC DEVICE THERAPY: CPT

## 2021-05-19 NOTE — FLOWSHEET NOTE
[address] [facility]  Phone: [phone]   Fax: [phone]    Date:  2021    Patient Name:  Aarti Lo    :  1950  MRN: [id number]  Restrictions/Precautions:    Medical/Treatment Diagnosis Information:  · Diagnosis: Bilateral LE lymphedema     Insurance/Certification information:  PT Insurance Information: UMR---90/10 plan, medical necessity  Physician Information:  Referring Practitioner: Dr. Thompson Spikes of care signed (Y/N): []  Yes [x]  No     Date of Patient follow up with Physician:     Functional scale[de-identified]  LLIS  raw score = 1472 ; dysfunction = 19%    Progress Report: []  Yes  [x]  No     Date Range for reporting period:  Beginning   Ending     Progress report due (10 Rx/or 30 days whichever is less): visit #39 or      Recertification due (POC duration/ or 90 days whichever is less): visit #    Visit # Insurance Allowable Auth required? Date Range    [place] []  [place]  [x]  No         Latex Allergy:  [x]NO      []YES  Preferred Language for Healthcare:   [x]English       []other:      Pain level:  4/10     SUBJECTIVE: : legs are hurting today. Doesn't know why. Has been trying to watch her salt but it is difficult         OBJECTIVE: : see measures taken below; bilateral ankles significantly swollen; no fibrotic tissue in either leg.  No erythema      RESTRICTIONS/PRECAUTIONS:     Exercises/Interventions:     Therapeutic Exercises (68305) Resistance / level Sets/sec Reps Notes          Pt educated on exercises to stimulate lymphatic flow                                 Therapeutic Activities (97164)  (Dynamic activities such as compression, designed to improve functional performance)  15 min     Compression: trial tensoshape size   applied to        Multilayer compression bandaging to   Stockinette  Artiflex  Foam   8 cm low stretch compression bandage  10 cm low stretch compression bandage  cm low stretch compression bandage   cm low stretch compression bandage        X  X    X  x  BLE   Re-measure both legs                                   Home Management  (providing pt education on safety procedures/instructions)       Pt educated on compression as follows:   how to appropriately apply and wear compression  how to maintain appropriate gradient compression  do not sleep with compression garment/tensoshape  signs and symptoms of constriction   when to remove compression      Pt educated on lymphedema prevention and management        Educated on self node clearance                                   Neuromuscular Re-ed (00241)                            Manual Intervention (77900)  60 min     See MLD flowchart below--B LE  x                                          Manual Lymph Drainage (MLD):  MLD to B LE, clearing along alternate pathway to ipsilateral axillary lymph nodes    Clear Nodes 10x each   Neck x   [address] x   Axilla x   Abdomen x   Groin x   Popliteal x2 x   Clear Alternate Pathway 10x each   Re-clear alternate pathway   x5 each position x   Location Ipsilateral axilla       Fluid Mobilization 10x each   Re-clear alternate pathway   x5 each position x   Shoulder bracing    Location        Protein Resorption 10x each   Location        Clear Foot/Ankle or Hand 10x each   Achilles x   Bilateral malleolus x   Fan the cards x   Clear dorsum x   Clear through web space x   Clear toes/fingers    Fluid mobilization x   Re-clear all positions  X5 each x       GIRTH MEASUREMENT  (Tape on skin along medial aspect of LE)    Lower Extremity Right (cm) Left  (cm)   Date 5/17 5/17        cm  Widest at hip, measured in standing 790.6    cm at  umbilicus, measured in standing 118.7         Metatarsal heads 24.6 25.5   10Cm above inferior aspect of lat mall  27.5 27.2   20Cm abovinferior aspect of lat mall     39.9 39.5   30Cm abovinferior aspect of lat mall   49.6 48.7   40Cm aboveinferior aspect of lat mall  49.9 50.0   55Cm above inferior aspect of lat mall   62.3 61.5   65Cm above inferior aspect of lat mall 66.5 67.0                  Total Girth        320.3 cm    319.4 cm     total limb girth on 4/14 on R was 324.5 cm and on L was 324.3 cm  Total limb girth on 5/5 on R was 314.2 cm and on L was 317.7    Modalities: 5/19: pump x 15 mins to B LE  5/7: vasopneumatic pump x 20 min B LE    OTHER: 4/30: advised patient to obtain 30-40 mm HG LE compression garment to the knee; tensoshape not providing enough compression between sessions and patient is struggling to apply wraps on her own. Pt education:   Pt educated on pathology and anatomy of etiology of lymphedema, condition precautions, indications for long term prognosis. Pt was educated on diagnosis; prognosis; PT POC including MLD, compression (role of multilayer bandaging/ compression garments), lymphedema management/prevention of flare ups, role of exercise, HEP, lymphedema pump; expectations for rehab. All pt questions were answered and handouts provided    HEP instruction: 4/19:added self node clearance to HEP  4/23: added pathway clearance swiping technique to HEP; discussed self wrapping using comprilan; handouts provided and patient demonstrated understanding       Therapeutic Exercise and NMR EXR  [] (42736) Provided verbal/tactile cueing for activities related to strengthening, flexibility, endurance, ROM for improvements in  [] LE / Lumbar: LE, proximal hip, and core control with self care, mobility, lifting, ambulation.   [] UE / Cervical: cervical, postural, scapular, scapulothoracic and UE control with self care, reaching, carrying, lifting, house/yardwork, driving, computer work.  [] (22202) Provided verbal/tactile cueing for activities related to improving balance, coordination, kinesthetic sense, posture, motor skill, proprioception to assist with   [] LE / lumbar: LE, proximal hip, and core control in self care, mobility, lifting, ambulation and eccentric single leg control. [] UE / cervical: cervical, scapular, scapulothoracic and UE control with self care, reaching, carrying, lifting, house/yardwork, driving, computer work.   [] (17144) Therapist is in constant attendance of 2 or more patients providing skilled therapy interventions, but not providing any significant amount of measurable one-on-one time to either patient, for improvements in  [] LE / lumbar: LE, proximal hip, and core control in self care, mobility, lifting, ambulation and eccentric single leg control. [] UE / cervical: cervical, scapular, scapulothoracic and UE control with self care, reaching, carrying, lifting, house/yardwork, driving, computer work. NMR and Therapeutic Activities:    [x] (82147 or 91132) Provided verbal/tactile cueing for activities related to improving balance, coordination, kinesthetic sense, posture, motor skill, proprioception and motor activation to allow for proper function of   [x] LE: / Lumbar core, proximal hip and LE with self care and ADLs  [] UE / Cervical: cervical, postural, scapular, scapulothoracic and UE control with self care, carrying, lifting, driving, computer work.   [] (60957) Gait Re-education- Provided training and instruction to the patient for proper LE, core and proximal hip recruitment and positioning and eccentric body weight control with ambulation re-education including up and down stairs     Home Management Training / Self Care:  [x] (90254) Provided self-care/home management training related to activities of daily living and compensatory training, and/or use of adaptive equipment for improvement with: ADLs and compensatory training, meal preparation, safety procedures and instruction in use of adaptive equipment, including bathing, grooming, dressing, personal hygiene, basic household cleaning and chores.      Home Exercise Program:    [x] (70836) Reviewed/Progressed HEP activities related to strengthening, flexibility, endurance, ROM of [] LE / Lumbar: core, proximal hip and LE for functional self-care, mobility, lifting and ambulation/stair navigation   [] UE / Cervical: cervical, postural, scapular, scapulothoracic and UE control with self care, reaching, carrying, lifting, house/yardwork, driving, computer work  [] (42247)Reviewed/Progressed HEP activities related to improving balance, coordination, kinesthetic sense, posture, motor skill, proprioception of   [] LE: core, proximal hip and LE for self care, mobility, lifting, and ambulation/stair navigation    [] UE / Cervical: cervical, postural,  scapular, scapulothoracic and UE control with self care, reaching, carrying, lifting, house/yardwork, driving, computer work    Manual Treatments:  PROM / STM / Oscillations-Mobs:  G-I, II, III, IV (PA's, Inf., Post.)  [x] (64733) Provided manual therapy to mobilize LE, proximal hip and/or LS spine soft tissue/joints for the purpose of modulating pain, promoting relaxation,  increasing ROM, reducing/eliminating soft tissue swelling/inflammation/restriction, improving soft tissue extensibility and allowing for proper ROM for normal function with   [x] LE / lumbar: self care, mobility, lifting and ambulation. [] UE / Cervical: self care, reaching, carrying, lifting, house/yardwork, driving, computer work. Modalities:  [] (88195) Vasopneumatic compression: Utilized vasopneumatic compression to decrease edema / swelling for the purpose of improving mobility and quad tone / recruitment which will allow for increased overall function including but not limited to self-care, transfers, ambulation, and ascending / descending stairs.        Charges:  Timed Code Treatment Minutes: 90   Total Treatment Minutes: 90     [] EVAL - LOW (29327)   [] EVAL - MOD (59808)  [] EVAL - HIGH (73566)  [] RE-EVAL (98867)  [] LP(20723) x       [] Ionto  [] NMR (48618) x       [x] Vaso  [x] Manual (67972) x   4    [] Ultrasound  [x] TA x   1     [] Mech Traction []Housework  []Driving []Job related tasks  []Sports/Recreation [x]Other:donning/doffing shoe and pants        ASSESSMENT:  Making nice progress towards goals, but LE swelling still not yet fully stabilized. Working on getting patient a home pump to assist with that. Plan to continue MLD and compressive wrapping to address the swelling. Treatment/Activity Tolerance:  [x] Patient able to complete tx [] Patient limited by fatigue  [] Patient limited by pain  [] Patient limited by other medical complications  [] Other:     Prognosis: [x] Good [] Fair  [] Poor    Patient Requires Follow-up: [x] Yes  [] No    Plan for next treatment session: continue MLD and multi-layer compression for B LE; have contacted Corrine from [employer] regarding home pump  MLD, compression, HEP, pt education, lymph pump as appropriate, exercise to stimulate lymphatic flow    PLAN:requesting additional 2 x week for 4 weeks. [x] Continue per plan of care [] Alter current plan (see comments)  [] Plan of care initiated [] Hold pending MD visit [] Discharge    Electronically signed by: Gilbert Gilliam DPT [id number]    Note: If patient does not return for scheduled/ recommended follow up visits, this note will serve as a discharge from care along with most recent update on progress.

## 2021-05-24 ENCOUNTER — HOSPITAL ENCOUNTER (OUTPATIENT)
Dept: PHYSICAL THERAPY | Age: 71
Setting detail: THERAPIES SERIES
Discharge: HOME | End: 2021-05-24
Payer: COMMERCIAL

## 2021-05-24 PROCEDURE — 97140 MANUAL THERAPY 1/> REGIONS: CPT

## 2021-05-24 PROCEDURE — 97530 THERAPEUTIC ACTIVITIES: CPT

## 2021-05-24 NOTE — FLOWSHEET NOTE
[address] [facility]  Phone: [phone]   Fax: [phone]    Date:  2021    Patient Name:  Aarti Lo    :  1950  MRN: [id number]  Restrictions/Precautions:    Medical/Treatment Diagnosis Information:  · Diagnosis: Bilateral LE lymphedema     Insurance/Certification information:  PT Insurance Information: UMR---90/10 plan, medical necessity  Physician Information:  Referring Practitioner: Dr. Thompson Spikes of care signed (Y/N): []  Yes [x]  No     Date of Patient follow up with Physician:     Functional scale[de-identified]  LLIS  raw score = 14/72 ; dysfunction = 19%    Progress Report: []  Yes  [x]  No     Date Range for reporting period:  Beginning   Ending     Progress report due (10 Rx/or 30 days whichever is less): visit #62 or      Recertification due (POC duration/ or 90 days whichever is less): visit #    Visit # Insurance Allowable Auth required? Date Range    [place] []  [place]  [x]  No         Latex Allergy:  [x]NO      []YES  Preferred Language for Healthcare:   [x]English       []other:      Pain level:  0/10     SUBJECTIVE: no new complaints; will likely be approved for pump through insurance, waiting to hear. Also waiting for approval for additional PT.        OBJECTIVE: : see measures taken below; bilateral ankles significantly swollen; no fibrotic tissue in either leg.  No erythema      RESTRICTIONS/PRECAUTIONS:     Exercises/Interventions:     Therapeutic Exercises (74750) Resistance / level Sets/sec Reps Notes          Pt educated on exercises to stimulate lymphatic flow                                 Therapeutic Activities (90334)  (Dynamic activities such as compression, designed to improve functional performance)  25 min     Compression: trial tensoshape size   applied to        Multilayer compression bandaging to   Stockinette  Artiflex  Foam   8 cm low stretch compression bandage  10 cm low stretch compression aspect of lat mall  49.9 50.0   55Cm above inferior aspect of lat mall   62.3 61.5   65Cm above inferior aspect of lat mall 66.5 67.0                  Total Girth        320.3 cm    319.4 cm     total limb girth on 4/14 on R was 324.5 cm and on L was 324.3 cm  Total limb girth on 5/5 on R was 314.2 cm and on L was 317.7    Modalities: 5/19: pump x 15 mins to B LE  5/7: vasopneumatic pump x 20 min B LE    OTHER: 4/30: advised patient to obtain 30-40 mm HG LE compression garment to the knee; tensoshape not providing enough compression between sessions and patient is struggling to apply wraps on her own. Pt education:   Pt educated on pathology and anatomy of etiology of lymphedema, condition precautions, indications for long term prognosis. Pt was educated on diagnosis; prognosis; PT POC including MLD, compression (role of multilayer bandaging/ compression garments), lymphedema management/prevention of flare ups, role of exercise, HEP, lymphedema pump; expectations for rehab. All pt questions were answered and handouts provided    HEP instruction: 4/19:added self node clearance to HEP  4/23: added pathway clearance swiping technique to HEP; discussed self wrapping using comprilan; handouts provided and patient demonstrated understanding       Therapeutic Exercise and NMR EXR  [] (85102) Provided verbal/tactile cueing for activities related to strengthening, flexibility, endurance, ROM for improvements in  [] LE / Lumbar: LE, proximal hip, and core control with self care, mobility, lifting, ambulation.   [] UE / Cervical: cervical, postural, scapular, scapulothoracic and UE control with self care, reaching, carrying, lifting, house/yardwork, driving, computer work.  [] (28808) Provided verbal/tactile cueing for activities related to improving balance, coordination, kinesthetic sense, posture, motor skill, proprioception to assist with   [] LE / lumbar: LE, proximal hip, and core control in self care, mobility, lifting, ambulation and eccentric single leg control. [] UE / cervical: cervical, scapular, scapulothoracic and UE control with self care, reaching, carrying, lifting, house/yardwork, driving, computer work.   [] (53841) Therapist is in constant attendance of 2 or more patients providing skilled therapy interventions, but not providing any significant amount of measurable one-on-one time to either patient, for improvements in  [] LE / lumbar: LE, proximal hip, and core control in self care, mobility, lifting, ambulation and eccentric single leg control. [] UE / cervical: cervical, scapular, scapulothoracic and UE control with self care, reaching, carrying, lifting, house/yardwork, driving, computer work. NMR and Therapeutic Activities:    [x] (25315 or 74370) Provided verbal/tactile cueing for activities related to improving balance, coordination, kinesthetic sense, posture, motor skill, proprioception and motor activation to allow for proper function of   [x] LE: / Lumbar core, proximal hip and LE with self care and ADLs  [] UE / Cervical: cervical, postural, scapular, scapulothoracic and UE control with self care, carrying, lifting, driving, computer work.   [] (04645) Gait Re-education- Provided training and instruction to the patient for proper LE, core and proximal hip recruitment and positioning and eccentric body weight control with ambulation re-education including up and down stairs     Home Management Training / Self Care:  [x] (00719) Provided self-care/home management training related to activities of daily living and compensatory training, and/or use of adaptive equipment for improvement with: ADLs and compensatory training, meal preparation, safety procedures and instruction in use of adaptive equipment, including bathing, grooming, dressing, personal hygiene, basic household cleaning and chores.      Home Exercise Program:    [x] (89120) Reviewed/Progressed HEP activities related to strengthening, flexibility, endurance, ROM of   [] LE / Lumbar: core, proximal hip and LE for functional self-care, mobility, lifting and ambulation/stair navigation   [] UE / Cervical: cervical, postural, scapular, scapulothoracic and UE control with self care, reaching, carrying, lifting, house/yardwork, driving, computer work  [] (21531)Reviewed/Progressed HEP activities related to improving balance, coordination, kinesthetic sense, posture, motor skill, proprioception of   [] LE: core, proximal hip and LE for self care, mobility, lifting, and ambulation/stair navigation    [] UE / Cervical: cervical, postural,  scapular, scapulothoracic and UE control with self care, reaching, carrying, lifting, house/yardwork, driving, computer work    Manual Treatments:  PROM / STM / Oscillations-Mobs:  G-I, II, III, IV (PA's, Inf., Post.)  [x] (30350) Provided manual therapy to mobilize LE, proximal hip and/or LS spine soft tissue/joints for the purpose of modulating pain, promoting relaxation,  increasing ROM, reducing/eliminating soft tissue swelling/inflammation/restriction, improving soft tissue extensibility and allowing for proper ROM for normal function with   [x] LE / lumbar: self care, mobility, lifting and ambulation. [] UE / Cervical: self care, reaching, carrying, lifting, house/yardwork, driving, computer work. Modalities:  [] (66290) Vasopneumatic compression: Utilized vasopneumatic compression to decrease edema / swelling for the purpose of improving mobility and quad tone / recruitment which will allow for increased overall function including but not limited to self-care, transfers, ambulation, and ascending / descending stairs.        Charges:  Timed Code Treatment Minutes: 85   Total Treatment Minutes: 85     [] EVAL - LOW (62144)   [] EVAL - MOD (54676)  [] EVAL - HIGH (44850)  [] RE-EVAL (21501)  [] VG(72686) x       [] Ionto  [] NMR (09362) x       [] Vaso  [x] Manual (21515) x   4    [] Ultrasound  [x] TA x   2     [] [facility] Traction (70595)  [] Aquatic Therapy x     [] ES (un) (07633):   [] Home Management Training x  [] ES(attended) (20158)   [] Dry Needling 1-2 muscles (41414):  [] Dry Needling 3+ muscles (742982  [] Group:      [] Other:     GOALS:   GOALS:  Patient stated goal: \"get rid of swelling\"  [x] Progressing: [] Met: [] Not Met: [] Adjusted    Therapist goals for Patient:   Short Term Goals: To be achieved in: 2 weeks  1. Independent in HEP and progression per patient tolerance, in order to prevent return of swelling   [] Progressing: [x] Met: [] Not Met: [] Adjusted  2. Patient will have a decrease in swelling/pain to facilitate improvement in movement, function, and ADLs as indicated by improvement with LLIS. [] Progressing: [x] Met: [] Not Met: [] Adjusted    Long Term Goals: To be achieved in: 6 weeks  1. Disability index score of 10% or less on the LLIS to assist with reaching prior level of function. [x] Progressing: [] Met: [] Not Met: [] Adjusted    2. Decrease swelling of B LE by at least 15 cm total limb volume so that pt can return to functional activities including walking up and down steps and getting in and out of bed without increased symptoms or restriction. [x] Progressing: [] Met: [] Not Met: [] Adjusted      Overall Progression Towards Functional goals/ Treatment Progress Update:  [x] Patient is progressing as expected towards functional goals listed. [] Progression is slowed due to complexities/Impairments listed. [] Progression has been slowed due to co-morbidities.   [] Plan just implemented, too soon to assess goals progression <30days   [] Goals require adjustment due to lack of progress  [] Patient is not progressing as expected and requires additional follow up with physician  [] Other    Persisting Functional Limitations/Impairments:  []Sleeping []Sitting               [x]Standing [x]Transfers        [x]Walking []Kneeling               []Stairs []Squatting / bending   [x]ADLs []Reaching  []Lifting  []Housework  []Driving []Job related tasks  []Sports/Recreation [x]Other:donning/doffing shoe and pants        ASSESSMENT:  Making nice progress towards goals, but LE swelling still not yet fully stabilized. Working on getting patient a home pump to assist with that. Plan to continue MLD and compressive wrapping to address the swelling. Treatment/Activity Tolerance:  [x] Patient able to complete tx [] Patient limited by fatigue  [] Patient limited by pain  [] Patient limited by other medical complications  [] Other:     Prognosis: [x] Good [] Fair  [] Poor    Patient Requires Follow-up: [x] Yes  [] No    Plan for next treatment session: continue MLD and multi-layer compression for B LE; have contacted Corrine from [employer] regarding home pump  MLD, compression, HEP, pt education, lymph pump as appropriate, exercise to stimulate lymphatic flow    PLAN:requesting additional 2 x week for 4 weeks. [x] Continue per plan of care [] Alter current plan (see comments)  [] Plan of care initiated [] Hold pending MD visit [] Discharge    Electronically signed by: Yamilex Beltran DPT [id number]    Note: If patient does not return for scheduled/ recommended follow up visits, this note will serve as a discharge from care along with most recent update on progress.

## 2021-05-27 ENCOUNTER — TELEPHONE (OUTPATIENT)
Dept: VASCULAR SURGERY | Age: 71
End: 2021-05-27

## 2021-05-27 NOTE — TELEPHONE ENCOUNTER
Patient called in stating she need her Lymphedema therapy extended for another 4 weeks.     Please send [facility]

## 2021-05-28 ENCOUNTER — HOSPITAL ENCOUNTER (OUTPATIENT)
Dept: PHYSICAL THERAPY | Age: 71
Setting detail: THERAPIES SERIES
Discharge: HOME | End: 2021-05-28
Payer: COMMERCIAL

## 2021-05-28 PROCEDURE — 97140 MANUAL THERAPY 1/> REGIONS: CPT

## 2021-05-28 PROCEDURE — 97530 THERAPEUTIC ACTIVITIES: CPT

## 2021-05-28 NOTE — FLOWSHEET NOTE
[address] [facility]  Phone: [phone]   Fax: [phone]    Date:  2021    Patient Name:  Kathie Harris    :  1950  MRN: [id number]  Restrictions/Precautions:    Medical/Treatment Diagnosis Information:  · Diagnosis: Bilateral LE lymphedema     Insurance/Certification information:  PT Insurance Information: UMR---90/10 plan, medical necessity  Physician Information:  Referring Practitioner: Dr. Dianne Lanza of care signed (Y/N): []  Yes [x]  No     Date of Patient follow up with Physician:     Functional scale[de-identified]  LLIS  raw score = 14/72 ; dysfunction = 19%    Progress Report: []  Yes  [x]  No     Date Range for reporting period:  Beginning   Ending     Progress report due (10 Rx/or 30 days whichever is less): visit #96 or      Recertification due (POC duration/ or 90 days whichever is less): visit #    Visit # Insurance Allowable Auth required? Date Range   ;  [place] []  Yes  [x]  No         Latex Allergy:  [x]NO      []YES  Preferred Language for Healthcare:   [x]English       []other:      Pain level:  0/10     SUBJECTIVE: no new complaints; will likely be approved for pump through insurance, waiting to hear. Also waiting for approval for additional PT.        OBJECTIVE: : see measures taken below; bilateral ankles significantly swollen; no fibrotic tissue in either leg.  No erythema      RESTRICTIONS/PRECAUTIONS:     Exercises/Interventions:     Therapeutic Exercises (52240) Resistance / level Sets/sec Reps Notes          Pt educated on exercises to stimulate lymphatic flow                                 Therapeutic Activities (05343)  (Dynamic activities such as compression, designed to improve functional performance)  25 min     Compression: trial tensoshape size   applied to        Multilayer compression bandaging to   Stockinette  Artiflex  Foam   8 cm low stretch compression bandage  10 cm low stretch compression bandage  cm low stretch compression bandage   cm low stretch compression bandage        X  X  X    X  x  BLE   Re-measure both legs                                   Home Management  (providing pt education on safety procedures/instructions)       Pt educated on compression as follows:   how to appropriately apply and wear compression  how to maintain appropriate gradient compression  do not sleep with compression garment/tensoshape  signs and symptoms of constriction   when to remove compression      Pt educated on lymphedema prevention and management        Educated on self node clearance                                   Neuromuscular Re-ed (32275)                            Manual Intervention (45222)  60 min     See MLD flowchart below--B LE  x                                          Manual Lymph Drainage (MLD):  MLD to B LE, clearing along alternate pathway to ipsilateral axillary lymph nodes    Clear Nodes 10x each   Neck x   [address] x   Axilla x   Abdomen x   Groin x   Popliteal x2 x   Clear Alternate Pathway 10x each   Re-clear alternate pathway   x5 each position x   Location Ipsilateral axilla       Fluid Mobilization 10x each   Re-clear alternate pathway   x5 each position x   Shoulder bracing    Location        Protein Resorption 10x each   Location        Clear Foot/Ankle or Hand 10x each   Achilles x   Bilateral malleolus x   Fan the cards x   Clear dorsum x   Clear through web space x   Clear toes/fingers    Fluid mobilization x   Re-clear all positions  X5 each x       GIRTH MEASUREMENT  (Tape on skin along medial aspect of LE)    Lower Extremity Right (cm) Left  (cm)   Date 5/17 5/17        cm  Widest at hip, measured in standing 465.3    cm at  umbilicus, measured in standing 118.7         Metatarsal heads 24.6 25.5   10Cm above inferior aspect of lat mall  27.5 27.2   20Cm abovinferior aspect of lat mall     39.9 39.5   30Cm abovinferior aspect of lat mall   49.6 48.7   40Cm aboveinferior aspect of lat mall  49.9 50.0   55Cm above inferior aspect of lat mall   62.3 61.5   65Cm above inferior aspect of lat mall 66.5 67.0                  Total Girth        320.3 cm    319.4 cm     total limb girth on 4/14 on R was 324.5 cm and on L was 324.3 cm  Total limb girth on 5/5 on R was 314.2 cm and on L was 317.7    Modalities: 5/19: pump x 15 mins to B LE  5/7: vasopneumatic pump x 20 min B LE    OTHER: 4/30: advised patient to obtain 30-40 mm HG LE compression garment to the knee; tensoshape not providing enough compression between sessions and patient is struggling to apply wraps on her own. Pt education:   Pt educated on pathology and anatomy of etiology of lymphedema, condition precautions, indications for long term prognosis. Pt was educated on diagnosis; prognosis; PT POC including MLD, compression (role of multilayer bandaging/ compression garments), lymphedema management/prevention of flare ups, role of exercise, HEP, lymphedema pump; expectations for rehab. All pt questions were answered and handouts provided    HEP instruction: 4/19:added self node clearance to HEP  4/23: added pathway clearance swiping technique to HEP; discussed self wrapping using comprilan; handouts provided and patient demonstrated understanding       Therapeutic Exercise and NMR EXR  [] (63153) Provided verbal/tactile cueing for activities related to strengthening, flexibility, endurance, ROM for improvements in  [] LE / Lumbar: LE, proximal hip, and core control with self care, mobility, lifting, ambulation.   [] UE / Cervical: cervical, postural, scapular, scapulothoracic and UE control with self care, reaching, carrying, lifting, house/yardwork, driving, computer work.  [] (27082) Provided verbal/tactile cueing for activities related to improving balance, coordination, kinesthetic sense, posture, motor skill, proprioception to assist with   [] LE / lumbar: LE, proximal hip, and core control in self care, mobility, Ultrasound  [x] TA x   2     [] [facility] Traction (64161)  [] Aquatic Therapy x     [] ES (un) (96473):   [] Home Management Training x  [] ES(attended) (95410)   [] Dry Needling 1-2 muscles (35864):  [] Dry Needling 3+ muscles (395833  [] Group:      [] Other:     GOALS:   GOALS:  Patient stated goal: \"get rid of swelling\"  [x] Progressing: [] Met: [] Not Met: [] Adjusted    Therapist goals for Patient:   Short Term Goals: To be achieved in: 2 weeks  1. Independent in HEP and progression per patient tolerance, in order to prevent return of swelling   [] Progressing: [x] Met: [] Not Met: [] Adjusted  2. Patient will have a decrease in swelling/pain to facilitate improvement in movement, function, and ADLs as indicated by improvement with LLIS. [] Progressing: [x] Met: [] Not Met: [] Adjusted    Long Term Goals: To be achieved in: 6 weeks  1. Disability index score of 10% or less on the LLIS to assist with reaching prior level of function. [x] Progressing: [] Met: [] Not Met: [] Adjusted    2. Decrease swelling of B LE by at least 15 cm total limb volume so that pt can return to functional activities including walking up and down steps and getting in and out of bed without increased symptoms or restriction. [x] Progressing: [] Met: [] Not Met: [] Adjusted      Overall Progression Towards Functional goals/ Treatment Progress Update:  [x] Patient is progressing as expected towards functional goals listed. [] Progression is slowed due to complexities/Impairments listed. [] Progression has been slowed due to co-morbidities.   [] Plan just implemented, too soon to assess goals progression <30days   [] Goals require adjustment due to lack of progress  [] Patient is not progressing as expected and requires additional follow up with physician  [] Other    Persisting Functional Limitations/Impairments:  []Sleeping []Sitting               [x]Standing [x]Transfers        [x]Walking []Kneeling               []Stairs []Squatting / bending   [x]ADLs []Reaching  []Lifting  []Housework  []Driving []Job related tasks  []Sports/Recreation [x]Other:donning/doffing shoe and pants        ASSESSMENT:  Making nice progress towards goals, but LE swelling still not yet fully stabilized. Working on getting patient a home pump to assist with that. Plan to continue MLD and compressive wrapping to address the swelling. Treatment/Activity Tolerance:  [x] Patient able to complete tx [] Patient limited by fatigue  [] Patient limited by pain  [] Patient limited by other medical complications  [] Other:     Prognosis: [x] Good [] Fair  [] Poor    Patient Requires Follow-up: [x] Yes  [] No    Plan for next treatment session: continue MLD and multi-layer compression for B LE; have contacted Corrine from [employer] regarding home pump  MLD, compression, HEP, pt education, lymph pump as appropriate, exercise to stimulate lymphatic flow    PLAN:requesting additional 2 x week for 4 weeks. [x] Continue per plan of care [] Alter current plan (see comments)  [] Plan of care initiated [] Hold pending MD visit [] Discharge    Electronically signed by: Santi De La Rosa DPT [id number]    Note: If patient does not return for scheduled/ recommended follow up visits, this note will serve as a discharge from care along with most recent update on progress.

## 2021-05-28 NOTE — FLOWSHEET NOTE
[facility]  Phone: [phone]   Fax: [phone]    Date:  2021    Patient Name:  Millie Guzman    :  1950  MRN: [id number]  Restrictions/Precautions:    Medical/Treatment Diagnosis Information:  · Diagnosis: Bilateral LE lymphedema     Insurance/Certification information:  PT Insurance Information: UMR---90/10 plan, medical necessity  Physician Information:  Referring Practitioner: Dr. Rosa Schofield of care signed (Y/N): []  Yes [x]  No     Date of Patient follow up with Physician:     Functional scale[de-identified]  LLIS  raw score =  ; dysfunction = 19%    Progress Report: []  Yes  [x]  No     Date Range for reporting period:  Beginning   Ending     Progress report due (10 Rx/or 30 days whichever is less): visit #16 or      Recertification due (POC duration/ or 90 days whichever is less): visit #    Visit # Insurance Allowable Auth required? Date [place]   ;  [place] []  Yes  [x]  No         Latex Allergy:  [x]NO      []YES  Preferred Language for Healthcare:   [x]English       []other:      Pain level:  0/10     SUBJECTIVE: no new complaints; will likely be approved for pump through insurance, waiting to hear. Also waiting for approval for additional PT.        OBJECTIVE: : see measures taken below; bilateral ankles significantly swollen; no fibrotic tissue in either leg.  No erythema      RESTRICTIONS/PRECAUTIONS:     Exercises/Interventions:     Therapeutic Exercises (53926) Resistance / level Sets/sec Reps Notes          Pt educated on exercises to stimulate lymphatic flow                                 Therapeutic Activities (28844)  (Dynamic activities such as compression, designed to improve functional performance)  25 min     Compression: trial tensoshape size   applied to        Multilayer compression bandaging to   Stockinette  Artiflex  Foam   8 cm low stretch compression bandage  10 cm low stretch compression bandage  cm low stretch compression bandage   cm low stretch compression bandage        X  X  X    X  x  BLE   Re-measure both legs                                   Home Management  (providing pt education on safety procedures/instructions)       Pt educated on compression as follows:   how to appropriately apply and wear compression  how to maintain appropriate gradient compression  do not sleep with compression garment/tensoshape  signs and symptoms of constriction   when to remove compression      Pt educated on lymphedema prevention and management        Educated on self node clearance                                   Neuromuscular Re-ed (62310)                            Manual Intervention (56952)  60 min     See MLD flowchart below--B LE  x                                          Manual Lymph Drainage (MLD):  MLD to B LE, clearing along alternate pathway to ipsilateral axillary lymph nodes    Clear Nodes 10x each   Neck x   [address] x   Axilla x   Abdomen x   Groin x   Popliteal x2 x   Clear Alternate Pathway 10x each   Re-clear alternate pathway   x5 each position x   Location Ipsilateral axilla       Fluid Mobilization 10x each   Re-clear alternate pathway   x5 each position x   Shoulder bracing    Location        Protein Resorption 10x each   Location        Clear Foot/Ankle or Hand 10x each   Achilles x   Bilateral malleolus x   Fan the cards x   Clear dorsum x   Clear through web space x   Clear toes/fingers    Fluid mobilization x   Re-clear all positions  X5 each x       GIRTH MEASUREMENT  (Tape on skin along medial aspect of LE)    Lower Extremity Right (cm) Left  (cm)   Date 5/17 5/17        cm  Widest at hip, measured in standing 989.8    cm at  umbilicus, measured in standing 118.7         Metatarsal heads 24.6 25.5   10Cm above inferior aspect of lat mall  27.5 27.2   20Cm abovinferior aspect of lat mall     39.9 39.5   30Cm abovinferior aspect of lat mall   49.6 48.7   40Cm aboveinferior aspect of lat mall  49.9 50.0   55Cm above inferior aspect of lat mall   62.3 61.5   65Cm above inferior aspect of lat mall 66.5 67.0                  Total Girth        320.3 cm    319.4 cm     total limb girth on 4/14 on R was 324.5 cm and on L was 324.3 cm  Total limb girth on 5/5 on R was 314.2 cm and on L was 317.7    Modalities: 5/19: pump x 15 mins to B LE  5/7: vasopneumatic pump x 20 min B LE    OTHER: 4/30: advised patient to obtain 30-40 mm HG LE compression garment to the knee; tensoshape not providing enough compression between sessions and patient is struggling to apply wraps on her own. Pt education:   Pt educated on pathology and anatomy of etiology of lymphedema, condition precautions, indications for long term prognosis. Pt was educated on diagnosis; prognosis; PT POC including MLD, compression (role of multilayer bandaging/ compression garments), lymphedema management/prevention of flare ups, role of exercise, HEP, lymphedema pump; expectations for rehab. All pt questions were answered and handouts provided    HEP instruction: 4/19:added self node clearance to HEP  4/23: added pathway clearance swiping technique to HEP; discussed self wrapping using comprilan; handouts provided and patient demonstrated understanding       Therapeutic Exercise and NMR EXR  [] (04155) Provided verbal/tactile cueing for activities related to strengthening, flexibility, endurance, ROM for improvements in  [] LE / Lumbar: LE, proximal hip, and core control with self care, mobility, lifting, ambulation.   [] UE / Cervical: cervical, postural, scapular, scapulothoracic and UE control with self care, reaching, carrying, lifting, house/yardwork, driving, computer work.  [] (01921) Provided verbal/tactile cueing for activities related to improving balance, coordination, kinesthetic sense, posture, motor skill, proprioception to assist with   [] LE / lumbar: LE, proximal hip, and core control in self care, mobility, lifting, ambulation and eccentric single leg control. [] UE / cervical: cervical, scapular, scapulothoracic and UE control with self care, reaching, carrying, lifting, house/yardwork, driving, computer work.   [] (56585) Therapist is in constant attendance of 2 or more patients providing skilled therapy interventions, but not providing any significant amount of measurable one-on-one time to either patient, for improvements in  [] LE / lumbar: LE, proximal hip, and core control in self care, mobility, lifting, ambulation and eccentric single leg control. [] UE / cervical: cervical, scapular, scapulothoracic and UE control with self care, reaching, carrying, lifting, house/yardwork, driving, computer work. NMR and Therapeutic Activities:    [x] (25885 or 15445) Provided verbal/tactile cueing for activities related to improving balance, coordination, kinesthetic sense, posture, motor skill, proprioception and motor activation to allow for proper function of   [x] LE: / Lumbar core, proximal hip and LE with self care and ADLs  [] UE / Cervical: cervical, postural, scapular, scapulothoracic and UE control with self care, carrying, lifting, driving, computer work.   [] (71685) Gait Re-education- Provided training and instruction to the patient for proper LE, core and proximal hip recruitment and positioning and eccentric body weight control with ambulation re-education including up and down stairs     Home Management Training / Self Care:  [x] (36439) Provided self-care/home management training related to activities of daily living and compensatory training, and/or use of adaptive equipment for improvement with: ADLs and compensatory training, meal preparation, safety procedures and instruction in use of adaptive equipment, including bathing, grooming, dressing, personal hygiene, basic household cleaning and chores.      Home Exercise Program:    [x] (10850) Reviewed/Progressed HEP activities related to strengthening, flexibility, endurance, ROM of   [] LE / Lumbar: core, proximal hip and LE for functional self-care, mobility, lifting and ambulation/stair navigation   [] UE / Cervical: cervical, postural, scapular, scapulothoracic and UE control with self care, reaching, carrying, lifting, house/yardwork, driving, computer work  [] (03741)Reviewed/Progressed HEP activities related to improving balance, coordination, kinesthetic sense, posture, motor skill, proprioception of   [] LE: core, proximal hip and LE for self care, mobility, lifting, and ambulation/stair navigation    [] UE / Cervical: cervical, postural,  scapular, scapulothoracic and UE control with self care, reaching, carrying, lifting, house/yardwork, driving, computer work    Manual Treatments:  PROM / STM / Oscillations-Mobs:  G-I, II, III, IV (PA's, Inf., Post.)  [x] (83448) Provided manual therapy to mobilize LE, proximal hip and/or LS spine soft tissue/joints for the purpose of modulating pain, promoting relaxation,  increasing ROM, reducing/eliminating soft tissue swelling/inflammation/restriction, improving soft tissue extensibility and allowing for proper ROM for normal function with   [x] LE / lumbar: self care, mobility, lifting and ambulation. [] UE / Cervical: self care, reaching, carrying, lifting, house/yardwork, driving, computer work. Modalities:  [] (44927) Vasopneumatic compression: Utilized vasopneumatic compression to decrease edema / swelling for the purpose of improving mobility and quad tone / recruitment which will allow for increased overall function including but not limited to self-care, transfers, ambulation, and ascending / descending stairs.        Charges:  Timed Code Treatment Minutes: 85   Total Treatment Minutes: 85     [] EVAL - LOW (41186)   [] EVAL - MOD (07761)  [] EVAL - HIGH (97589)  [] RE-EVAL (40403)  [] YJ(14304) x       [] Ionto  [] NMR (41272) x       [] Vaso  [x] Manual (43712) x   4    [] Ultrasound [x] TA x   2     [] [facility] (61505)  [] Aquatic Therapy x     [] ES (un) (66379):   [] Home Management Training x  [] ES(attended) (10370)   [] Dry Needling 1-2 muscles (88196):  [] Dry Needling 3+ muscles (869282  [] Group:      [] Other:     GOALS:   GOALS:  Patient stated goal: \"get rid of swelling\"  [x] Progressing: [] Met: [] Not Met: [] Adjusted    Therapist goals for Patient:   Short Term Goals: To be achieved in: 2 weeks  1. Independent in HEP and progression per patient tolerance, in order to prevent return of swelling   [] Progressing: [x] Met: [] Not Met: [] Adjusted  2. Patient will have a decrease in swelling/pain to facilitate improvement in movement, function, and ADLs as indicated by improvement with LLIS. [] Progressing: [x] Met: [] Not Met: [] Adjusted    Long Term Goals: To be achieved in: 6 weeks  1. Disability index score of 10% or less on the LLIS to assist with reaching prior level of function. [x] Progressing: [] Met: [] Not Met: [] Adjusted    2. Decrease swelling of B LE by at least 15 cm total limb volume so that pt can return to functional activities including walking up and down steps and getting in and out of bed without increased symptoms or restriction. [x] Progressing: [] Met: [] Not Met: [] Adjusted      Overall Progression Towards Functional goals/ Treatment Progress Update:  [x] Patient is progressing as expected towards functional goals listed. [] Progression is slowed due to complexities/Impairments listed. [] Progression has been slowed due to co-morbidities.   [] Plan just implemented, too soon to assess goals progression <30days   [] Goals require adjustment due to lack of progress  [] Patient is not progressing as expected and requires additional follow up with physician  [] Other    Persisting Functional Limitations/Impairments:  []Sleeping []Sitting               [x]Standing [x]Transfers        [x]Walking []Kneeling               []Stairs []Squatting / bending   [x]ADLs []Reaching  []Lifting  []Housework  []Driving []Job related tasks  []Sports/Recreation [x]Other:donning/doffing shoe and pants        ASSESSMENT:  Improving LE lymphedema. Still waiting on pump for home, should be in soon. Planning to continue MLD/wrapping to fully stabilize lymphedema before discharge. Treatment/Activity Tolerance:  [x] Patient able to complete tx [] Patient limited by fatigue  [] Patient limited by pain  [] Patient limited by other medical complications  [] Other:     Prognosis: [x] Good [] Fair  [] Poor    Patient Requires Follow-up: [x] Yes  [] No    Plan for next treatment session: continue MLD and multi-layer compression for B LE; have contacted Corrine from [employer] regarding home pump  MLD, compression, HEP, pt education, lymph pump as appropriate, exercise to stimulate lymphatic flow    PLAN:requesting additional 2 x week for 4 weeks. [x] Continue per plan of care [] Alter current plan (see comments)  [] Plan of care initiated [] Hold pending MD visit [] Discharge    Electronically signed by: Aliya Velasco DPT [id number]    Note: If patient does not return for scheduled/ recommended follow up visits, this note will serve as a discharge from care along with most recent update on progress.

## 2021-05-28 NOTE — FLOWSHEET NOTE
stretch compression bandage        X  X  X    X  x  BLE   Re-measure both legs                                   Home Management  (providing pt education on safety procedures/instructions)       Pt educated on compression as follows:   how to appropriately apply and wear compression  how to maintain appropriate gradient compression  do not sleep with compression garment/tensoshape  signs and symptoms of constriction   when to remove compression      Pt educated on lymphedema prevention and management        Educated on self node clearance                                   Neuromuscular Re-ed (32130)                            Manual Intervention (14819)  60 min     See MLD flowchart below--B LE  x                                          Manual Lymph Drainage (MLD):  MLD to B LE, clearing along alternate pathway to ipsilateral axillary lymph nodes    Clear Nodes 10x each   Neck x   [address] x   Axilla x   Abdomen x   Groin x   Popliteal x2 x   Clear Alternate Pathway 10x each   Re-clear alternate pathway   x5 each position x   Location Ipsilateral axilla       Fluid Mobilization 10x each   Re-clear alternate pathway   x5 each position x   Shoulder bracing    Location        Protein Resorption 10x each   Location        Clear Foot/Ankle or Hand 10x each   Achilles x   Bilateral malleolus x   Fan the cards x   Clear dorsum x   Clear through web space x   Clear toes/fingers    Fluid mobilization x   Re-clear all positions  X5 each x       GIRTH MEASUREMENT  (Tape on skin along medial aspect of LE)    Lower Extremity Right (cm) Left  (cm)   Date 5/17 5/17        cm  Widest at hip, measured in standing 052.1    cm at  umbilicus, measured in standing 118.7         Metatarsal heads 24.6 25.5   10Cm above inferior aspect of lat mall  27.5 27.2   20Cm abovinferior aspect of lat mall     39.9 39.5   30Cm abovinferior aspect of lat mall   49.6 48.7   40Cm aboveinferior aspect of lat mall  49.9 50.0   55Cm above inferior aspect of lat mall   62.3 61.5   65Cm above inferior aspect of lat mall 66.5 67.0                  Total Girth        320.3 cm    319.4 cm     total limb girth on 4/14 on R was 324.5 cm and on L was 324.3 cm  Total limb girth on 5/5 on R was 314.2 cm and on L was 317.7    Modalities: 5/19: pump x 15 mins to B LE  5/7: vasopneumatic pump x 20 min B LE    OTHER: 4/30: advised patient to obtain 30-40 mm HG LE compression garment to the knee; tensoshape not providing enough compression between sessions and patient is struggling to apply wraps on her own. Pt education:   Pt educated on pathology and anatomy of etiology of lymphedema, condition precautions, indications for long term prognosis. Pt was educated on diagnosis; prognosis; PT POC including MLD, compression (role of multilayer bandaging/ compression garments), lymphedema management/prevention of flare ups, role of exercise, HEP, lymphedema pump; expectations for rehab. All pt questions were answered and handouts provided    HEP instruction: 4/19:added self node clearance to HEP  4/23: added pathway clearance swiping technique to HEP; discussed self wrapping using comprilan; handouts provided and patient demonstrated understanding       Therapeutic Exercise and NMR EXR  [] (08796) Provided verbal/tactile cueing for activities related to strengthening, flexibility, endurance, ROM for improvements in  [] LE / Lumbar: LE, proximal hip, and core control with self care, mobility, lifting, ambulation.   [] UE / Cervical: cervical, postural, scapular, scapulothoracic and UE control with self care, reaching, carrying, lifting, house/yardwork, driving, computer work.  [] (30768) Provided verbal/tactile cueing for activities related to improving balance, coordination, kinesthetic sense, posture, motor skill, proprioception to assist with   [] LE / lumbar: LE, proximal hip, and core control in self care, mobility, lifting, ambulation and eccentric single leg control. [] UE / cervical: cervical, scapular, scapulothoracic and UE control with self care, reaching, carrying, lifting, house/yardwork, driving, computer work.   [] (53404) Therapist is in constant attendance of 2 or more patients providing skilled therapy interventions, but not providing any significant amount of measurable one-on-one time to either patient, for improvements in  [] LE / lumbar: LE, proximal hip, and core control in self care, mobility, lifting, ambulation and eccentric single leg control. [] UE / cervical: cervical, scapular, scapulothoracic and UE control with self care, reaching, carrying, lifting, house/yardwork, driving, computer work. NMR and Therapeutic Activities:    [x] (16089 or 35558) Provided verbal/tactile cueing for activities related to improving balance, coordination, kinesthetic sense, posture, motor skill, proprioception and motor activation to allow for proper function of   [x] LE: / Lumbar core, proximal hip and LE with self care and ADLs  [] UE / Cervical: cervical, postural, scapular, scapulothoracic and UE control with self care, carrying, lifting, driving, computer work.   [] (78919) Gait Re-education- Provided training and instruction to the patient for proper LE, core and proximal hip recruitment and positioning and eccentric body weight control with ambulation re-education including up and down stairs     Home Management Training / Self Care:  [x] (76548) Provided self-care/home management training related to activities of daily living and compensatory training, and/or use of adaptive equipment for improvement with: ADLs and compensatory training, meal preparation, safety procedures and instruction in use of adaptive equipment, including bathing, grooming, dressing, personal hygiene, basic household cleaning and chores.      Home Exercise Program:    [x] (17476) Reviewed/Progressed HEP activities related to strengthening, flexibility, endurance, ROM of   [] LE /

## 2021-06-02 ENCOUNTER — TELEPHONE (OUTPATIENT)
Dept: VASCULAR SURGERY | Age: 71
End: 2021-06-02

## 2021-06-21 RX ORDER — SPIRONOLACTONE 25 MG/1
TABLET ORAL
Qty: 90 TABLET | Refills: 1 | Status: SHIPPED | OUTPATIENT
Start: 2021-06-21 | End: 2021-11-22 | Stop reason: ALTCHOICE

## 2021-06-23 ENCOUNTER — APPOINTMENT (OUTPATIENT)
Dept: PHYSICAL THERAPY | Age: 71
End: 2021-06-23
Payer: COMMERCIAL

## 2021-06-26 ENCOUNTER — HOSPITAL ENCOUNTER (OUTPATIENT)
Dept: PHYSICAL THERAPY | Age: 71
Setting detail: THERAPIES SERIES
Discharge: HOME | End: 2021-06-26
Payer: COMMERCIAL

## 2021-06-26 PROCEDURE — 97530 THERAPEUTIC ACTIVITIES: CPT

## 2021-06-26 PROCEDURE — 97140 MANUAL THERAPY 1/> REGIONS: CPT

## 2021-06-26 NOTE — FLOWSHEET NOTE
[facility]  Phone: [phone]   Fax: [phone]    Date:  2021    Patient Name:  Nikita Vail    :  1950  MRN: [id number]  Restrictions/Precautions:    Medical/Treatment Diagnosis Information:  · Diagnosis: Bilateral LE lymphedema     Insurance/Certification information:  PT Insurance Information: [employer]---90/10 plan, medical necessity  Physician Information:  Referring Practitioner: Dr. Velia Snell of care signed (Y/N): []  Yes [x]  No     Date of Patient follow up with Physician:     Functional scale[de-identified]  LLIS  raw score = 14/72 ; dysfunction = 19%    Progress Report: []  Yes  [x]  No     Date Range for reporting period:  Beginning   Ending     Progress report due (10 Rx/or 30 days whichever is less): visit #33 or      Recertification due (POC duration/ or 90 days whichever is less): visit #    Visit # Insurance Allowable Auth required? Date Range   ;  [place] []  Yes  [x]  No         Latex Allergy:  [x]NO      []YES  Preferred Language for Healthcare:   [x]English       []other:      Pain level:  0/10     SUBJECTIVE: returning after several weeks off, reporting that she is noticing increased swelling particularly in her ankles. She has been less diligent with her diet and her compression wraps due to visit from family out of town.        OBJECTIVE: : see measures below; mild skin irritation at right inner thigh noted      RESTRICTIONS/PRECAUTIONS:     Exercises/Interventions:     Therapeutic Exercises (37956) Resistance / level Sets/sec Reps Notes          Pt educated on exercises to stimulate lymphatic flow                                 Therapeutic Activities (16497)  (Dynamic activities such as compression, designed to improve functional performance)  30 min     Compression: trial tensoshape size   applied to        Multilayer compression bandaging to   Stockinette  Artiflex  Foam at ankles anteriorly/laterally  8 cm low stretch compression bandage  10 cm low stretch compression bandage  cm low stretch compression bandage   cm low stretch compression bandage        X  X  X    X  x  BLE   Re-measure both legs  10 min                                 Home Management  (providing pt education on safety procedures/instructions)       Pt educated on compression as follows:   how to appropriately apply and wear compression  how to maintain appropriate gradient compression  do not sleep with compression garment/tensoshape  signs and symptoms of constriction   when to remove compression      Pt educated on lymphedema prevention and management        Educated on self node clearance                                   Neuromuscular Re-ed (70621)                            Manual Intervention (33772)  55 min     See MLD flowchart below--B LE  x                                          Manual Lymph Drainage (MLD):  MLD to B LE, clearing along alternate pathway to ipsilateral axillary lymph nodes    Clear Nodes 10x each   Neck x   [address] x   Axilla x   Abdomen x   Groin x   Popliteal x2 x   Clear Alternate Pathway 10x each   Re-clear alternate pathway   x5 each position x   Location Ipsilateral axilla       Fluid Mobilization 10x each   Re-clear alternate pathway   x5 each position x   Shoulder bracing    Location        Protein Resorption 10x each   Location        Clear Foot/Ankle or Hand 10x each   Achilles x   Bilateral malleolus x   Fan the cards x   Clear dorsum x   Clear through web space x   Clear toes/fingers    Fluid mobilization x   Re-clear all positions  X5 each x       GIRTH MEASUREMENT  (Tape on skin along medial aspect of LE)    Lower Extremity Right (cm) Left  (cm)   Date 6/26 6/26        cm  Widest at hip, measured in standing 634.4    cm at  umbilicus, measured in standing 118.7         Metatarsal heads 24.7 25.4   10Cm above inferior aspect of lat mall  26.0 26.9   20Cm abovinferior aspect of lat mall     37.4 37.4   30Cm abovinferior aspect of lat mall   48.0 47.8   40Cm aboveinferior aspect of lat mall  49.5 50.7   55Cm above inferior aspect of lat mall   62.8 61.5   65Cm above inferior aspect of lat mall 68.4 66.6                  Total Girth        316.8 cm    316.3 cm     total limb girth on 4/14 on R was 324.5 cm and on L was 324.3 cm  Total limb girth on 5/5 on R was 314.2 cm and on L was 317.7 cm  Total limb girth on 5/17 on R was 320.3 cm and on L was 319.4 cm    Modalities: 5/19: pump x 15 mins to B LE  5/7: vasopneumatic pump x 20 min B LE    OTHER: 4/30: advised patient to obtain 30-40 mm HG LE compression garment to the knee; tensoshape not providing enough compression between sessions and patient is struggling to apply wraps on her own. Pt education:   Pt educated on pathology and anatomy of etiology of lymphedema, condition precautions, indications for long term prognosis. Pt was educated on diagnosis; prognosis; PT POC including MLD, compression (role of multilayer bandaging/ compression garments), lymphedema management/prevention of flare ups, role of exercise, HEP, lymphedema pump; expectations for rehab. All pt questions were answered and handouts provided    HEP instruction: 4/19:added self node clearance to HEP  4/23: added pathway clearance swiping technique to HEP; discussed self wrapping using comprilan; handouts provided and patient demonstrated understanding  6/26: resume home wrapping, pump after removing today's wraps and then apply wraps again. Continue diligence with home manual lymph drainage and pump daily       Therapeutic Exercise and NMR EXR  [] (95503) Provided verbal/tactile cueing for activities related to strengthening, flexibility, endurance, ROM for improvements in  [] LE / Lumbar: LE, proximal hip, and core control with self care, mobility, lifting, ambulation.   [] UE / Cervical: cervical, postural, scapular, scapulothoracic and UE control with self improvement with: ADLs and compensatory training, meal preparation, safety procedures and instruction in use of adaptive equipment, including bathing, grooming, dressing, personal hygiene, basic household cleaning and chores. Home Exercise Program:    [x] (30066) Reviewed/Progressed HEP activities related to strengthening, flexibility, endurance, ROM of   [] LE / Lumbar: core, proximal hip and LE for functional self-care, mobility, lifting and ambulation/stair navigation   [] UE / Cervical: cervical, postural, scapular, scapulothoracic and UE control with self care, reaching, carrying, lifting, house/yardwork, driving, computer work  [] (79960)Reviewed/Progressed HEP activities related to improving balance, coordination, kinesthetic sense, posture, motor skill, proprioception of   [] LE: core, proximal hip and LE for self care, mobility, lifting, and ambulation/stair navigation    [] UE / Cervical: cervical, postural,  scapular, scapulothoracic and UE control with self care, reaching, carrying, lifting, house/yardwork, driving, computer work    Manual Treatments:  PROM / STM / Oscillations-Mobs:  G-I, II, III, IV (PA's, Inf., Post.)  [x] (45597) Provided manual therapy to mobilize LE, proximal hip and/or LS spine soft tissue/joints for the purpose of modulating pain, promoting relaxation,  increasing ROM, reducing/eliminating soft tissue swelling/inflammation/restriction, improving soft tissue extensibility and allowing for proper ROM for normal function with   [x] LE / lumbar: self care, mobility, lifting and ambulation. [] UE / Cervical: self care, reaching, carrying, lifting, house/yardwork, driving, computer work.      Modalities:  [] (74389) Vasopneumatic compression: Utilized vasopneumatic compression to decrease edema / swelling for the purpose of improving mobility and quad tone / recruitment which will allow for increased overall function including but not limited to self-care, transfers, ambulation, and ascending / descending stairs. Charges:  Timed Code Treatment Minutes: 95   Total Treatment Minutes: 95     [] EVAL - LOW (64173)   [] EVAL - MOD (83966)  [] EVAL - HIGH (80974)  [] RE-EVAL (89093)  [] BE(01207) x       [] Ionto  [] NMR (89494) x       [] Vaso  [x] Manual (89466) x   4    [] Ultrasound  [x] TA x   2     [] Mech Traction (18590)  [] Aquatic Therapy x     [] ES (un) (30872):   [] Home Management Training x  [] ES(attended) (89280)   [] Dry Needling 1-2 muscles (53870):  [] Dry Needling 3+ muscles (659233  [] Group:      [] Other:     GOALS:   GOALS:  Patient stated goal: \"get rid of swelling\"  [x] Progressing: [] Met: [] Not Met: [] Adjusted    Therapist goals for Patient:   Short Term Goals: To be achieved in: 2 weeks  1. Independent in HEP and progression per patient tolerance, in order to prevent return of swelling   [] Progressing: [x] Met: [] Not Met: [] Adjusted  2. Patient will have a decrease in swelling/pain to facilitate improvement in movement, function, and ADLs as indicated by improvement with LLIS. [] Progressing: [x] Met: [] Not Met: [] Adjusted    Long Term Goals: To be achieved in: 6 weeks  1. Disability index score of 10% or less on the LLIS to assist with reaching prior level of function. [x] Progressing: [] Met: [] Not Met: [] Adjusted    2. Decrease swelling of B LE by at least 15 cm total limb volume so that pt can return to functional activities including walking up and down steps and getting in and out of bed without increased symptoms or restriction. [x] Progressing: [] Met: [] Not Met: [] Adjusted      Overall Progression Towards Functional goals/ Treatment Progress Update:  [x] Patient is progressing as expected towards functional goals listed. [] Progression is slowed due to complexities/Impairments listed. [] Progression has been slowed due to co-morbidities.   [] Plan just implemented, too soon to assess goals progression <30days   [] Goals require adjustment due to lack of progress  [] Patient is not progressing as expected and requires additional follow up with physician  [] Other    Persisting Functional Limitations/Impairments:  []Sleeping []Sitting               [x]Standing [x]Transfers        [x]Walking []Kneeling               []Stairs []Squatting / bending   [x]ADLs []Reaching  []Lifting  []Housework  []Driving []Job related tasks  []Sports/Recreation [x]Other:donning/doffing shoe and pants        ASSESSMENT:  Swelling fairly well stabilized and actually improved overall since starting pump, but more swollen at bilateral ankles today. Treatment/Activity Tolerance:  [x] Patient able to complete tx [] Patient limited by fatigue  [] Patient limited by pain  [] Patient limited by other medical complications  [] Other:     Prognosis: [x] Good [] Fair  [] Poor    Patient Requires Follow-up: [x] Yes  [] No    Plan for next treatment session: continue MLD and multi-layer compression for B LE; MLD, compression, HEP, pt education, lymph pump as appropriate, exercise to stimulate lymphatic flow    PLAN:  [x] Continue per plan of care [] Alter current plan (see comments)  [] Plan of care initiated [] Hold pending MD visit [] Discharge    Electronically signed by: Morris Donaldson DPT [id number]    Note: If patient does not return for scheduled/ recommended follow up visits, this note will serve as a discharge from care along with most recent update on progress.

## 2021-06-28 RX ORDER — TORSEMIDE 20 MG/1
TABLET ORAL
Qty: 180 TABLET | Refills: 1 | Status: SHIPPED | OUTPATIENT
Start: 2021-06-28 | End: 2021-07-06

## 2021-06-30 ENCOUNTER — HOSPITAL ENCOUNTER (OUTPATIENT)
Dept: PHYSICAL THERAPY | Age: 71
Setting detail: THERAPIES SERIES
Discharge: HOME | End: 2021-06-30
Payer: COMMERCIAL

## 2021-06-30 PROCEDURE — 97530 THERAPEUTIC ACTIVITIES: CPT

## 2021-06-30 PROCEDURE — 97140 MANUAL THERAPY 1/> REGIONS: CPT

## 2021-06-30 NOTE — FLOWSHEET NOTE
[facility]  Phone: [phone]   Fax: [phone]    Date:  2021    Patient Name:  Junior Allen    :  1950  MRN: [id number]  Restrictions/Precautions:    Medical/Treatment Diagnosis Information:  · Diagnosis: Bilateral LE lymphedema     Insurance/Certification information:  PT Insurance Information: UMR---90/10 plan, medical necessity  Physician Information:  Referring Practitioner: Dr. Baeza Aid of care signed (Y/N): []  Yes [x]  No     Date of Patient follow up with Physician:     Functional scale[de-identified]  LLIS  raw score = 14/72 ; dysfunction = 19%    Progress Report: []  Yes  [x]  No     Date Range for reporting period:  Beginning   Ending     Progress report due (10 Rx/or 30 days whichever is less): visit #97 or      Recertification due (POC duration/ or 90 days whichever is less): visit #    Visit # Insurance Allowable Auth required? Date Range   ; 3/8 [place] []  Yes  [x]  No         Latex Allergy:  [x]NO      []YES  Preferred Language for Healthcare:   [x]English       []other:      Pain level:  0/10     SUBJECTIVE: says that the new foam piece seemed to help decrease swelling at ankle.        OBJECTIVE: : see measures below; mild skin irritation at right inner thigh noted      RESTRICTIONS/PRECAUTIONS:     Exercises/Interventions:     Therapeutic Exercises (95870) Resistance / level Sets/sec Reps Notes          Pt educated on exercises to stimulate lymphatic flow                                 Therapeutic Activities (84860)  (Dynamic activities such as compression, designed to improve functional performance)  30 min     Compression: trial tensoshape size   applied to        Multilayer compression bandaging to   Stockinette  Artiflex  Foam at ankles anteriorly/laterally  8 cm low stretch compression bandage  10 cm low stretch compression bandage  cm low stretch compression bandage   cm low stretch compression bandage        X  X  X    X  x  BLE   Re-measure both legs                                   Home Management  (providing pt education on safety procedures/instructions)       Pt educated on compression as follows:   how to appropriately apply and wear compression  how to maintain appropriate gradient compression  do not sleep with compression garment/tensoshape  signs and symptoms of constriction   when to remove compression      Pt educated on lymphedema prevention and management        Educated on self node clearance                                   Neuromuscular Re-ed (83147)                            Manual Intervention (81769)  55 min     See MLD flowchart below--B LE  x                                          Manual Lymph Drainage (MLD):  MLD to B LE, clearing along alternate pathway to ipsilateral axillary lymph nodes    Clear Nodes 10x each   Neck x   [address] x   Axilla x   Abdomen x   Groin x   Popliteal x2 x   Clear Alternate Pathway 10x each   Re-clear alternate pathway   x5 each position x   Location Ipsilateral axilla       Fluid Mobilization 10x each   Re-clear alternate pathway   x5 each position x   Shoulder bracing    Location        Protein Resorption 10x each   Location        Clear Foot/Ankle or Hand 10x each   Achilles x   Bilateral malleolus x   Fan the cards x   Clear dorsum x   Clear through web space x   Clear toes/fingers    Fluid mobilization x   Re-clear all positions  X5 each x       GIRTH MEASUREMENT  (Tape on skin along medial aspect of LE)    Lower Extremity Right (cm) Left  (cm)   Date 6/26 6/26        cm  Widest at hip, measured in standing 520.6    cm at  umbilicus, measured in standing 118.7         Metatarsal heads 24.7 25.4   10Cm above inferior aspect of lat mall  26.0 26.9   20Cm abovinferior aspect of lat mall     37.4 37.4   30Cm abovinferior aspect of lat mall   48.0 47.8   40Cm aboveinferior aspect of lat mall  49.5 50.7   55Cm above inferior aspect of lat mall 62.8 61.5   65Cm above inferior aspect of lat mall 68.4 66.6                  Total Girth        316.8 cm    316.3 cm     total limb girth on 4/14 on R was 324.5 cm and on L was 324.3 cm  Total limb girth on 5/5 on R was 314.2 cm and on L was 317.7 cm  Total limb girth on 5/17 on R was 320.3 cm and on L was 319.4 cm    Modalities: 5/19: pump x 15 mins to B LE  5/7: vasopneumatic pump x 20 min B LE    OTHER: 4/30: advised patient to obtain 30-40 mm HG LE compression garment to the knee; tensoshape not providing enough compression between sessions and patient is struggling to apply wraps on her own. Pt education:   Pt educated on pathology and anatomy of etiology of lymphedema, condition precautions, indications for long term prognosis. Pt was educated on diagnosis; prognosis; PT POC including MLD, compression (role of multilayer bandaging/ compression garments), lymphedema management/prevention of flare ups, role of exercise, HEP, lymphedema pump; expectations for rehab. All pt questions were answered and handouts provided    HEP instruction: 4/19:added self node clearance to HEP  4/23: added pathway clearance swiping technique to HEP; discussed self wrapping using comprilan; handouts provided and patient demonstrated understanding  6/26: resume home wrapping, pump after removing today's wraps and then apply wraps again. Continue diligence with home manual lymph drainage and pump daily       Therapeutic Exercise and NMR EXR  [] (08784) Provided verbal/tactile cueing for activities related to strengthening, flexibility, endurance, ROM for improvements in  [] LE / Lumbar: LE, proximal hip, and core control with self care, mobility, lifting, ambulation.   [] UE / Cervical: cervical, postural, scapular, scapulothoracic and UE control with self care, reaching, carrying, lifting, house/yardwork, driving, computer work.  [] (54260) Provided verbal/tactile cueing for activities related to improving balance, coordination, kinesthetic sense, posture, motor skill, proprioception to assist with   [] LE / lumbar: LE, proximal hip, and core control in self care, mobility, lifting, ambulation and eccentric single leg control. [] UE / cervical: cervical, scapular, scapulothoracic and UE control with self care, reaching, carrying, lifting, house/yardwork, driving, computer work.   [] (98757) Therapist is in constant attendance of 2 or more patients providing skilled therapy interventions, but not providing any significant amount of measurable one-on-one time to either patient, for improvements in  [] LE / lumbar: LE, proximal hip, and core control in self care, mobility, lifting, ambulation and eccentric single leg control. [] UE / cervical: cervical, scapular, scapulothoracic and UE control with self care, reaching, carrying, lifting, house/yardwork, driving, computer work.      NMR and Therapeutic Activities:    [x] (49320 or 57072) Provided verbal/tactile cueing for activities related to improving balance, coordination, kinesthetic sense, posture, motor skill, proprioception and motor activation to allow for proper function of   [x] LE: / Lumbar core, proximal hip and LE with self care and ADLs  [] UE / Cervical: cervical, postural, scapular, scapulothoracic and UE control with self care, carrying, lifting, driving, computer work.   [] (36649) Gait Re-education- Provided training and instruction to the patient for proper LE, core and proximal hip recruitment and positioning and eccentric body weight control with ambulation re-education including up and down stairs     Home Management Training / Self Care:  [x] (46419) Provided self-care/home management training related to activities of daily living and compensatory training, and/or use of adaptive equipment for improvement with: ADLs and compensatory training, meal preparation, safety procedures and instruction in use of adaptive equipment, including bathing, grooming, dressing, personal hygiene, basic household cleaning and chores. Home Exercise Program:    [x] (79795) Reviewed/Progressed HEP activities related to strengthening, flexibility, endurance, ROM of   [] LE / Lumbar: core, proximal hip and LE for functional self-care, mobility, lifting and ambulation/stair navigation   [] UE / Cervical: cervical, postural, scapular, scapulothoracic and UE control with self care, reaching, carrying, lifting, house/yardwork, driving, computer work  [] (87697)Reviewed/Progressed HEP activities related to improving balance, coordination, kinesthetic sense, posture, motor skill, proprioception of   [] LE: core, proximal hip and LE for self care, mobility, lifting, and ambulation/stair navigation    [] UE / Cervical: cervical, postural,  scapular, scapulothoracic and UE control with self care, reaching, carrying, lifting, house/yardwork, driving, computer work    Manual Treatments:  PROM / STM / Oscillations-Mobs:  G-I, II, III, IV (PA's, Inf., Post.)  [x] (32677) Provided manual therapy to mobilize LE, proximal hip and/or LS spine soft tissue/joints for the purpose of modulating pain, promoting relaxation,  increasing ROM, reducing/eliminating soft tissue swelling/inflammation/restriction, improving soft tissue extensibility and allowing for proper ROM for normal function with   [x] LE / lumbar: self care, mobility, lifting and ambulation. [] UE / Cervical: self care, reaching, carrying, lifting, house/yardwork, driving, computer work. Modalities:  [] (82373) Vasopneumatic compression: Utilized vasopneumatic compression to decrease edema / swelling for the purpose of improving mobility and quad tone / recruitment which will allow for increased overall function including but not limited to self-care, transfers, ambulation, and ascending / descending stairs.        Charges:  Timed Code Treatment Minutes: 85   Total Treatment Minutes: 85     [] EVAL - LOW (34936)   [] EVAL - MOD (81266)  [] EVAL - HIGH (89902)  [] RE-EVAL (71256)  [] RR(72298) x       [] Ionto  [] NMR (84525) x       [] Vaso  [x] Manual (16858) x   4    [] Ultrasound  [x] TA x   2     [] Mech Traction (98779)  [] Aquatic Therapy x     [] ES (un) (06255):   [] Home Management Training x  [] ES(attended) (86627)   [] Dry Needling 1-2 muscles (33914):  [] Dry Needling 3+ muscles (740455  [] Group:      [] Other:     GOALS:   GOALS:  Patient stated goal: \"get rid of swelling\"  [x] Progressing: [] Met: [] Not Met: [] Adjusted    Therapist goals for Patient:   Short Term Goals: To be achieved in: 2 weeks  1. Independent in HEP and progression per patient tolerance, in order to prevent return of swelling   [] Progressing: [x] Met: [] Not Met: [] Adjusted  2. Patient will have a decrease in swelling/pain to facilitate improvement in movement, function, and ADLs as indicated by improvement with LLIS. [] Progressing: [x] Met: [] Not Met: [] Adjusted    Long Term Goals: To be achieved in: 6 weeks  1. Disability index score of 10% or less on the LLIS to assist with reaching prior level of function. [x] Progressing: [] Met: [] Not Met: [] Adjusted    2. Decrease swelling of B LE by at least 15 cm total limb volume so that pt can return to functional activities including walking up and down steps and getting in and out of bed without increased symptoms or restriction. [x] Progressing: [] Met: [] Not Met: [] Adjusted      Overall Progression Towards Functional goals/ Treatment Progress Update:  [x] Patient is progressing as expected towards functional goals listed. [] Progression is slowed due to complexities/Impairments listed. [] Progression has been slowed due to co-morbidities.   [] Plan just implemented, too soon to assess goals progression <30days   [] Goals require adjustment due to lack of progress  [] Patient is not progressing as expected and requires additional follow up with physician  [] Other    Persisting Functional Limitations/Impairments:  []Sleeping []Sitting               [x]Standing [x]Transfers        [x]Walking []Kneeling               []Stairs []Squatting / bending   [x]ADLs []Reaching  []Lifting  []Housework  []Driving []Job related tasks  []Sports/Recreation [x]Other:donning/doffing shoe and pants        ASSESSMENT:  6/26:Swelling fairly well stabilized and actually improved overall since starting pump, but more swollen at bilateral ankles today. Treatment/Activity Tolerance:  [x] Patient able to complete tx [] Patient limited by fatigue  [] Patient limited by pain  [] Patient limited by other medical complications  [] Other:     Prognosis: [x] Good [] Fair  [] Poor    Patient Requires Follow-up: [x] Yes  [] No    Plan for next treatment session: continue MLD and multi-layer compression for B LE; MLD, compression, HEP, pt education, lymph pump as appropriate, exercise to stimulate lymphatic flow    PLAN:  [x] Continue per plan of care [] Alter current plan (see comments)  [] Plan of care initiated [] Hold pending MD visit [] Discharge    Electronically signed by: Veronica Pollard DPT [id number]    Note: If patient does not return for scheduled/ recommended follow up visits, this note will serve as a discharge from care along with most recent update on progress.

## 2021-07-06 RX ORDER — LOSARTAN POTASSIUM 100 MG/1
TABLET ORAL
Qty: 90 TABLET | Refills: 3 | Status: SHIPPED | OUTPATIENT
Start: 2021-07-06 | End: 2022-08-02

## 2021-07-06 RX ORDER — TORSEMIDE 20 MG/1
TABLET ORAL
Qty: 90 TABLET | Refills: 1 | Status: SHIPPED | OUTPATIENT
Start: 2021-07-06 | End: 2021-11-22 | Stop reason: DRUGHIGH

## 2021-07-07 ENCOUNTER — HOSPITAL ENCOUNTER (OUTPATIENT)
Dept: PHYSICAL THERAPY | Age: 71
Setting detail: THERAPIES SERIES
Discharge: HOME | End: 2021-07-07
Payer: COMMERCIAL

## 2021-07-07 PROCEDURE — 97140 MANUAL THERAPY 1/> REGIONS: CPT

## 2021-07-07 PROCEDURE — 97530 THERAPEUTIC ACTIVITIES: CPT

## 2021-07-07 NOTE — FLOWSHEET NOTE
[address] [facility]  Phone: [phone]   Fax: [phone]    Date:  2021    Patient Name:  Paula Hayes    :  1950  MRN: [id number]  Restrictions/Precautions:    Medical/Treatment Diagnosis Information:  · Diagnosis: Bilateral LE lymphedema     Insurance/Certification information:  PT Insurance Information: UMR---90/10 plan, medical necessity  Physician Information:  Referring Practitioner: Dr. Jan Tena of care signed (Y/N): []  Yes [x]  No     Date of Patient follow up with Physician:     Functional scale[de-identified]  LLIS  raw score = 14/72 ; dysfunction = 19%    Progress Report: []  Yes  [x]  No     Date Range for reporting period:  Beginning   Ending     Progress report due (10 Rx/or 30 days whichever is less): visit #62 or      Recertification due (POC duration/ or 90 days whichever is less): visit #    Visit # Insurance Allowable Auth required? Date Range   ;  [place] []  Yes  [x]  No         Latex Allergy:  [x]NO      []YES  Preferred Language for Healthcare:   [x]English       []other:      Pain level:  0/10     SUBJECTIVE: frustrated with continued pain in the lower legs, especially the left one, when standing or walking for time periods greater than 5 minutes. Has been compliant with wearing compression and with use of pump as well as self-MLD. Discussed possibility of further diagnostics to see if there is another treatment path that may be appropriate for her.       OBJECTIVE: : see measures below; mild skin irritation at right inner thigh noted      RESTRICTIONS/PRECAUTIONS:     Exercises/Interventions:     Therapeutic Exercises (49934) Resistance / level Sets/sec Reps Notes          Pt educated on exercises to stimulate lymphatic flow                                 Therapeutic Activities (76744)  (Dynamic activities such as compression, designed to improve functional performance)  30 min     Compression: trial Metatarsal heads 24.7 25.4   10Cm above inferior aspect of lat mall  26.0 26.9   20Cm abovinferior aspect of lat mall     37.4 37.4   30Cm abovinferior aspect of lat mall   48.0 47.8   40Cm aboveinferior aspect of lat mall  49.5 50.7   55Cm above inferior aspect of lat mall   62.8 61.5   65Cm above inferior aspect of lat mall 68.4 66.6                  Total Girth        316.8 cm    316.3 cm     total limb girth on 4/14 on R was 324.5 cm and on L was 324.3 cm  Total limb girth on 5/5 on R was 314.2 cm and on L was 317.7 cm  Total limb girth on 5/17 on R was 320.3 cm and on L was 319.4 cm    Modalities: 5/19: pump x 15 mins to B LE  5/7: vasopneumatic pump x 20 min B LE    OTHER: 4/30: advised patient to obtain 30-40 mm HG LE compression garment to the knee; tensoshape not providing enough compression between sessions and patient is struggling to apply wraps on her own. Pt education:   Pt educated on pathology and anatomy of etiology of lymphedema, condition precautions, indications for long term prognosis. Pt was educated on diagnosis; prognosis; PT POC including MLD, compression (role of multilayer bandaging/ compression garments), lymphedema management/prevention of flare ups, role of exercise, HEP, lymphedema pump; expectations for rehab. All pt questions were answered and handouts provided    HEP instruction: 4/19:added self node clearance to HEP  4/23: added pathway clearance swiping technique to HEP; discussed self wrapping using comprilan; handouts provided and patient demonstrated understanding  6/26: resume home wrapping, pump after removing today's wraps and then apply wraps again.  Continue diligence with home manual lymph drainage and pump daily       Therapeutic Exercise and NMR EXR  [] (22753) Provided verbal/tactile cueing for activities related to strengthening, flexibility, endurance, ROM for improvements in  [] LE / Lumbar: LE, proximal hip, and core control with self care, mobility, lifting, ambulation. [] UE / Cervical: cervical, postural, scapular, scapulothoracic and UE control with self care, reaching, carrying, lifting, house/yardwork, driving, computer work.  [] (76217) Provided verbal/tactile cueing for activities related to improving balance, coordination, kinesthetic sense, posture, motor skill, proprioception to assist with   [] LE / lumbar: LE, proximal hip, and core control in self care, mobility, lifting, ambulation and eccentric single leg control. [] UE / cervical: cervical, scapular, scapulothoracic and UE control with self care, reaching, carrying, lifting, house/yardwork, driving, computer work.   [] (86437) Therapist is in constant attendance of 2 or more patients providing skilled therapy interventions, but not providing any significant amount of measurable one-on-one time to either patient, for improvements in  [] LE / lumbar: LE, proximal hip, and core control in self care, mobility, lifting, ambulation and eccentric single leg control. [] UE / cervical: cervical, scapular, scapulothoracic and UE control with self care, reaching, carrying, lifting, house/yardwork, driving, computer work.      NMR and Therapeutic Activities:    [x] (81448 or 13181) Provided verbal/tactile cueing for activities related to improving balance, coordination, kinesthetic sense, posture, motor skill, proprioception and motor activation to allow for proper function of   [x] LE: / Lumbar core, proximal hip and LE with self care and ADLs  [] UE / Cervical: cervical, postural, scapular, scapulothoracic and UE control with self care, carrying, lifting, driving, computer work.   [] (74374) Gait Re-education- Provided training and instruction to the patient for proper LE, core and proximal hip recruitment and positioning and eccentric body weight control with ambulation re-education including up and down stairs     Home Management Training / Self Care:  [x] (91080) Provided self-care/home management which will allow for increased overall function including but not limited to self-care, transfers, ambulation, and ascending / descending stairs. Charges:  Timed Code Treatment Minutes: 80   Total Treatment Minutes: 80     [] EVAL - LOW (49442)   [] EVAL - MOD (39774)  [] EVAL - HIGH (12330)  [] RE-EVAL (08294)  [] YN(91693) x       [] Ionto  [] NMR (85787) x       [] Vaso  [x] Manual (74070) x   3    [] Ultrasound  [x] TA x   2     [] Mech Traction (51704)  [] Aquatic Therapy x     [] ES (un) (89646):   [] Home Management Training x  [] ES(attended) (02659)   [] Dry Needling 1-2 muscles (46293):  [] Dry Needling 3+ muscles (469139  [] Group:      [] Other:     GOALS:   GOALS:  Patient stated goal: \"get rid of swelling\"  [x] Progressing: [] Met: [] Not Met: [] Adjusted    Therapist goals for Patient:   Short Term Goals: To be achieved in: 2 weeks  1. Independent in HEP and progression per patient tolerance, in order to prevent return of swelling   [] Progressing: [x] Met: [] Not Met: [] Adjusted  2. Patient will have a decrease in swelling/pain to facilitate improvement in movement, function, and ADLs as indicated by improvement with LLIS. [] Progressing: [x] Met: [] Not Met: [] Adjusted    Long Term Goals: To be achieved in: 6 weeks  1. Disability index score of 10% or less on the LLIS to assist with reaching prior level of function. [x] Progressing: [] Met: [] Not Met: [] Adjusted    2. Decrease swelling of B LE by at least 15 cm total limb volume so that pt can return to functional activities including walking up and down steps and getting in and out of bed without increased symptoms or restriction. [x] Progressing: [] Met: [] Not Met: [] Adjusted      Overall Progression Towards Functional goals/ Treatment Progress Update:  [x] Patient is progressing as expected towards functional goals listed. [] Progression is slowed due to complexities/Impairments listed.   [] Progression has been slowed due to co-morbidities. [] Plan just implemented, too soon to assess goals progression <30days   [] Goals require adjustment due to lack of progress  [] Patient is not progressing as expected and requires additional follow up with physician  [] Other    Persisting Functional Limitations/Impairments:  []Sleeping []Sitting               [x]Standing [x]Transfers        [x]Walking []Kneeling               []Stairs []Squatting / bending   [x]ADLs []Reaching  []Lifting  []Housework  []Driving []Job related tasks  []Sports/Recreation [x]Other:donning/doffing shoe and pants        ASSESSMENT: ankle swelling decreasing since adding the foam pieces to the plan. Considering wrapping and compression above the knee due to continued pain in the lower legs. Treatment/Activity Tolerance:  [x] Patient able to complete tx [] Patient limited by fatigue  [] Patient limited by pain  [] Patient limited by other medical complications  [] Other:     Prognosis: [x] Good [] Fair  [] Poor    Patient Requires Follow-up: [x] Yes  [] No    Plan for next treatment session: continue MLD and multi-layer compression for B LE; MLD, compression, HEP, pt education, lymph pump as appropriate, exercise to stimulate lymphatic flow    PLAN:  [x] Continue per plan of care [] Alter current plan (see comments)  [] Plan of care initiated [] Hold pending MD visit [] Discharge    Electronically signed by: Efrain Khan DPT [id number]    Note: If patient does not return for scheduled/ recommended follow up visits, this note will serve as a discharge from care along with most recent update on progress.

## 2021-07-10 ENCOUNTER — HOSPITAL ENCOUNTER (OUTPATIENT)
Dept: PHYSICAL THERAPY | Age: 71
Setting detail: THERAPIES SERIES
Discharge: HOME | End: 2021-07-10
Payer: COMMERCIAL

## 2021-07-10 PROCEDURE — 97530 THERAPEUTIC ACTIVITIES: CPT

## 2021-07-10 PROCEDURE — 97140 MANUAL THERAPY 1/> REGIONS: CPT

## 2021-07-10 NOTE — FLOWSHEET NOTE
[facility]  Phone: [phone]   Fax: [phone]    Date:  7/10/2021    Patient Name:  Martin Quintero    :  1950  MRN: [id number]  Restrictions/Precautions:    Medical/Treatment Diagnosis Information:  · Diagnosis: Bilateral LE lymphedema     Insurance/Certification information:  PT Insurance Information: UMR---90/10 plan, medical necessity  Physician Information:  Referring Practitioner: Dr. Kahn Alexandria of care signed (Y/N): []  Yes [x]  No     Date of Patient follow up with Physician:     Functional scale[de-identified]  LLIS  raw score = 14/72 ; dysfunction = 19%    Progress Report: []  Yes  [x]  No     Date Range for reporting period:  Beginning   Ending     Progress report due (10 Rx/or 30 days whichever is less): visit #07 or      Recertification due (POC duration/ or 90 days whichever is less): visit #    Visit # Insurance Allowable Auth required? Date Range   ;  [place] []  Yes  [x]  No         Latex Allergy:  [x]NO      []YES  Preferred Language for Healthcare:   [x]English       []other:      Pain level:  0/10     SUBJECTIVE:patient has not been able to obtain the 12 cm comprilan yet, but will order them today.   Still having some pain in her lower leg when limb volume is increased      OBJECTIVE: 7/10:see measures below        RESTRICTIONS/PRECAUTIONS:     Exercises/Interventions:     Therapeutic Exercises (01676) Resistance / level Sets/sec Reps Notes          Pt educated on exercises to stimulate lymphatic flow                                 Therapeutic Activities (83679)  (Dynamic activities such as compression, designed to improve functional performance)  30 min     Compression: trial tensoshape size   applied to        Multilayer compression bandaging to   Stockinette  Artiflex  Foam at ankles anteriorly/laterally  8 cm low stretch compression bandage  10 cm low stretch compression bandage  cm low stretch compression bandage   cm low stretch compression bandage  Above the knee tensoshape            X    X  X    X  BLE: applied above the knee tensoshape today and then wrapped comprilan without the foam below the knee as previous to attempt to get some compression above the knee; patient knows to remove if she notices any inc pain, n/t, skin discoloration of toes   Re-measure both legs  10 min                                 Home Management  (providing pt education on safety procedures/instructions)       Pt educated on compression as follows:   how to appropriately apply and wear compression  how to maintain appropriate gradient compression  do not sleep with compression garment/tensoshape  signs and symptoms of constriction   when to remove compression      Pt educated on lymphedema prevention and management        Educated on self node clearance                                   Neuromuscular Re-ed (09947)                            Manual Intervention (91448)  60 min     See MLD flowchart below--B LE  x                                          Manual Lymph Drainage (MLD):  MLD to B LE, clearing along alternate pathway to ipsilateral axillary lymph nodes    Clear Nodes 10x each   Neck x   [address] x   Axilla x   Abdomen x   Groin x   Popliteal x2 x   Clear Alternate Pathway 10x each   Re-clear alternate pathway   x5 each position x   Location Ipsilateral axilla       Fluid Mobilization 10x each   Re-clear alternate pathway   x5 each position x   Shoulder bracing    Location        Protein Resorption 10x each   Location        Clear Foot/Ankle or Hand 10x each   Achilles x   Bilateral malleolus x   Fan the cards x   Clear dorsum x   Clear through web space x   Clear toes/fingers    Fluid mobilization x   Re-clear all positions  X5 each x       GIRTH MEASUREMENT  (Tape on skin along medial aspect of LE)    Lower Extremity Right (cm) Left  (cm)   Date 7/10 7/10        cm  Widest at hip, measured in standing 136.3    cm at endurance, ROM for improvements in  [] LE / Lumbar: LE, proximal hip, and core control with self care, mobility, lifting, ambulation. [] UE / Cervical: cervical, postural, scapular, scapulothoracic and UE control with self care, reaching, carrying, lifting, house/yardwork, driving, computer work.  [] (46669) Provided verbal/tactile cueing for activities related to improving balance, coordination, kinesthetic sense, posture, motor skill, proprioception to assist with   [] LE / lumbar: LE, proximal hip, and core control in self care, mobility, lifting, ambulation and eccentric single leg control. [] UE / cervical: cervical, scapular, scapulothoracic and UE control with self care, reaching, carrying, lifting, house/yardwork, driving, computer work.   [] (66944) Therapist is in constant attendance of 2 or more patients providing skilled therapy interventions, but not providing any significant amount of measurable one-on-one time to either patient, for improvements in  [] LE / lumbar: LE, proximal hip, and core control in self care, mobility, lifting, ambulation and eccentric single leg control. [] UE / cervical: cervical, scapular, scapulothoracic and UE control with self care, reaching, carrying, lifting, house/yardwork, driving, computer work.      NMR and Therapeutic Activities:    [x] (37267 or 38292) Provided verbal/tactile cueing for activities related to improving balance, coordination, kinesthetic sense, posture, motor skill, proprioception and motor activation to allow for proper function of   [x] LE: / Lumbar core, proximal hip and LE with self care and ADLs  [] UE / Cervical: cervical, postural, scapular, scapulothoracic and UE control with self care, carrying, lifting, driving, computer work.   [] (30013) Gait Re-education- Provided training and instruction to the patient for proper LE, core and proximal hip recruitment and positioning and eccentric body weight control with ambulation re-education including up and down stairs     Home Management Training / Self Care:  [x] (92034) Provided self-care/home management training related to activities of daily living and compensatory training, and/or use of adaptive equipment for improvement with: ADLs and compensatory training, meal preparation, safety procedures and instruction in use of adaptive equipment, including bathing, grooming, dressing, personal hygiene, basic household cleaning and chores. Home Exercise Program:    [x] (76700) Reviewed/Progressed HEP activities related to strengthening, flexibility, endurance, ROM of   [] LE / Lumbar: core, proximal hip and LE for functional self-care, mobility, lifting and ambulation/stair navigation   [] UE / Cervical: cervical, postural, scapular, scapulothoracic and UE control with self care, reaching, carrying, lifting, house/yardwork, driving, computer work  [] (89240)Reviewed/Progressed HEP activities related to improving balance, coordination, kinesthetic sense, posture, motor skill, proprioception of   [] LE: core, proximal hip and LE for self care, mobility, lifting, and ambulation/stair navigation    [] UE / Cervical: cervical, postural,  scapular, scapulothoracic and UE control with self care, reaching, carrying, lifting, house/yardwork, driving, computer work    Manual Treatments:  PROM / STM / Oscillations-Mobs:  G-I, II, III, IV (PA's, Inf., Post.)  [x] (73689) Provided manual therapy to mobilize LE, proximal hip and/or LS spine soft tissue/joints for the purpose of modulating pain, promoting relaxation,  increasing ROM, reducing/eliminating soft tissue swelling/inflammation/restriction, improving soft tissue extensibility and allowing for proper ROM for normal function with   [x] LE / lumbar: self care, mobility, lifting and ambulation. [] UE / Cervical: self care, reaching, carrying, lifting, house/yardwork, driving, computer work.      Modalities:  [] (59389) Vasopneumatic compression: Utilized vasopneumatic compression to decrease edema / swelling for the purpose of improving mobility and quad tone / recruitment which will allow for increased overall function including but not limited to self-care, transfers, ambulation, and ascending / descending stairs. Charges:  Timed Code Treatment Minutes: 90   Total Treatment Minutes: 90     [] EVAL - LOW (10058)   [] EVAL - MOD (17038)  [] EVAL - HIGH (36783)  [] RE-EVAL (39121)  [] NE(56389) x       [] Ionto  [] NMR (19938) x       [] Vaso  [x] Manual (05346) x   4    [] Ultrasound  [x] TA x   2     [] Mech Traction (06630)  [] Aquatic Therapy x     [] ES (un) (08326):   [] Home Management Training x  [] ES(attended) (70466)   [] Dry Needling 1-2 muscles (58838):  [] Dry Needling 3+ muscles (373484  [] Group:      [] Other:     GOALS:   GOALS:  Patient stated goal: \"get rid of swelling\"  [x] Progressing: [] Met: [] Not Met: [] Adjusted    Therapist goals for Patient:   Short Term Goals: To be achieved in: 2 weeks  1. Independent in HEP and progression per patient tolerance, in order to prevent return of swelling   [] Progressing: [x] Met: [] Not Met: [] Adjusted  2. Patient will have a decrease in swelling/pain to facilitate improvement in movement, function, and ADLs as indicated by improvement with LLIS. [] Progressing: [x] Met: [] Not Met: [] Adjusted    Long Term Goals: To be achieved in: 6 weeks  1. Disability index score of 10% or less on the LLIS to assist with reaching prior level of function. [x] Progressing: [] Met: [] Not Met: [] Adjusted    2. Decrease swelling of B LE by at least 15 cm total limb volume so that pt can return to functional activities including walking up and down steps and getting in and out of bed without increased symptoms or restriction.    [x] Progressing: [] Met: [] Not Met: [] Adjusted      Overall Progression Towards Functional goals/ Treatment Progress Update:  [x] Patient is progressing as expected towards functional goals listed. [] Progression is slowed due to complexities/Impairments listed. [] Progression has been slowed due to co-morbidities. [] Plan just implemented, too soon to assess goals progression <30days   [] Goals require adjustment due to lack of progress  [] Patient is not progressing as expected and requires additional follow up with physician  [] Other    Persisting Functional Limitations/Impairments:  []Sleeping []Sitting               [x]Standing [x]Transfers        [x]Walking []Kneeling               []Stairs []Squatting / bending   [x]ADLs []Reaching  []Lifting  []Housework  []Driving []Job related tasks  []Sports/Recreation [x]Other:donning/doffing shoe and pants        ASSESSMENT: measurements largely stable with some improvement noted bilaterally; pain level in lower legs related to continued swelling causing some difficulty with longer distance ambulation; recommending patient consider appointment at [facility] for further assessment. Also changing plan to apply compression above the knee bilaterally to see if that will help   Treatment/Activity Tolerance:  [x] Patient able to complete tx [] Patient limited by fatigue  [] Patient limited by pain  [] Patient limited by other medical complications  [] Other:     Prognosis: [x] Good [] Fair  [] Poor    Patient Requires Follow-up: [x] Yes  [] No    Plan for next treatment session: continue MLD and multi-layer compression for B LE; MLD, compression, HEP, pt education, lymph pump as appropriate, exercise to stimulate lymphatic flow    PLAN:  [x] Continue per plan of care [] Alter current plan (see comments)  [] Plan of care initiated [] Hold pending MD visit [] Discharge    Electronically signed by: Roman Burnham DPT [id number]    Note: If patient does not return for scheduled/ recommended follow up visits, this note will serve as a discharge from care along with most recent update on progress.

## 2021-07-12 ENCOUNTER — HOSPITAL ENCOUNTER (OUTPATIENT)
Dept: PHYSICAL THERAPY | Age: 71
Setting detail: THERAPIES SERIES
Discharge: HOME | End: 2021-07-12
Payer: COMMERCIAL

## 2021-07-12 PROCEDURE — 97140 MANUAL THERAPY 1/> REGIONS: CPT

## 2021-07-12 PROCEDURE — 97530 THERAPEUTIC ACTIVITIES: CPT

## 2021-07-12 NOTE — FLOWSHEET NOTE
[facility]  Phone: [phone]   Fax: [phone]    Date:  2021    Patient Name:  Joss Burris    :  1950  MRN: [id number]  Restrictions/Precautions:    Medical/Treatment Diagnosis Information:  · Diagnosis: Bilateral LE lymphedema     Insurance/Certification information:  PT Insurance Information: UMR---90/10 plan, medical necessity  Physician Information:  Referring Practitioner: Dr. Vail Notch of care signed (Y/N): []  Yes [x]  No     Date of Patient follow up with Physician:     Functional scale[de-identified]  LLIS  raw score = 14/72 ; dysfunction = 19%    Progress Report: []  Yes  [x]  No     Date Range for reporting period:  Beginning   Ending     Progress report due (10 Rx/or 30 days whichever is less): visit #13 or 3/52     Recertification due (POC duration/ or 90 days whichever is less): visit #    Visit # Insurance Allowable Auth required? Date [place]   ;  [place] []  Yes  [x]  No         Latex Allergy:  [x]NO      []YES  Preferred Language for Healthcare:   [x]English       []other:      Pain level:  0/10     SUBJECTIVE:less pain after last treatment which included above the knee tensoshape. Patient will be ordering the additional wraps soon.       OBJECTIVE: 7/10:see measures below        RESTRICTIONS/PRECAUTIONS:     Exercises/Interventions:     Therapeutic Exercises (84979) Resistance / level Sets/sec Reps Notes          Pt educated on exercises to stimulate lymphatic flow                                 Therapeutic Activities (65266)  (Dynamic activities such as compression, designed to improve functional performance)  30 min     Compression: trial tensoshape size   applied to        Multilayer compression bandaging to   Stockinette  Artiflex  Foam at ankles anteriorly/laterally  8 cm low stretch compression bandage  10 cm low stretch compression bandage  cm low stretch compression bandage   cm low stretch compression bandage  Above the knee tensoshape            X    X  X    X  BLE: applied above the knee tensoshape today and then wrapped comprilan without the foam below the knee as previous to attempt to get some compression above the knee; patient knows to remove if she notices any inc pain, n/t, skin discoloration of toes   Re-measure both legs                                   Home Management  (providing pt education on safety procedures/instructions)       Pt educated on compression as follows:   how to appropriately apply and wear compression  how to maintain appropriate gradient compression  do not sleep with compression garment/tensoshape  signs and symptoms of constriction   when to remove compression      Pt educated on lymphedema prevention and management        Educated on self node clearance                                   Neuromuscular Re-ed (84758)                            Manual Intervention (94961)  60 min     See MLD flowchart below--B LE  x                                          Manual Lymph Drainage (MLD):  MLD to B LE, clearing along alternate pathway to ipsilateral axillary lymph nodes    Clear Nodes 10x each   Neck x   [address] x   Axilla x   Abdomen x   Groin x   Popliteal x2 x   Clear Alternate Pathway 10x each   Re-clear alternate pathway   x5 each position x   Location Ipsilateral axilla       Fluid Mobilization 10x each   Re-clear alternate pathway   x5 each position x   Shoulder bracing    Location        Protein Resorption 10x each   Location        Clear Foot/Ankle or Hand 10x each   Achilles x   Bilateral malleolus x   Fan the cards x   Clear dorsum x   Clear through web space x   Clear toes/fingers    Fluid mobilization x   Re-clear all positions  X5 each x       GIRTH MEASUREMENT  (Tape on skin along medial aspect of LE)    Lower Extremity Right (cm) Left  (cm)   Date 7/10 7/10        cm  Widest at hip, measured in standing 785.2    cm at  umbilicus, measured in standing Management Training / Self Care:  [x] (31627) Provided self-care/home management training related to activities of daily living and compensatory training, and/or use of adaptive equipment for improvement with: ADLs and compensatory training, meal preparation, safety procedures and instruction in use of adaptive equipment, including bathing, grooming, dressing, personal hygiene, basic household cleaning and chores. Home Exercise Program:    [x] (10397) Reviewed/Progressed HEP activities related to strengthening, flexibility, endurance, ROM of   [] LE / Lumbar: core, proximal hip and LE for functional self-care, mobility, lifting and ambulation/stair navigation   [] UE / Cervical: cervical, postural, scapular, scapulothoracic and UE control with self care, reaching, carrying, lifting, house/yardwork, driving, computer work  [] (52921)Reviewed/Progressed HEP activities related to improving balance, coordination, kinesthetic sense, posture, motor skill, proprioception of   [] LE: core, proximal hip and LE for self care, mobility, lifting, and ambulation/stair navigation    [] UE / Cervical: cervical, postural,  scapular, scapulothoracic and UE control with self care, reaching, carrying, lifting, house/yardwork, driving, computer work    Manual Treatments:  PROM / STM / Oscillations-Mobs:  G-I, II, III, IV (PA's, Inf., Post.)  [x] (19476) Provided manual therapy to mobilize LE, proximal hip and/or LS spine soft tissue/joints for the purpose of modulating pain, promoting relaxation,  increasing ROM, reducing/eliminating soft tissue swelling/inflammation/restriction, improving soft tissue extensibility and allowing for proper ROM for normal function with   [x] LE / lumbar: self care, mobility, lifting and ambulation. [] UE / Cervical: self care, reaching, carrying, lifting, house/yardwork, driving, computer work.      Modalities:  [] (64257) Vasopneumatic compression: Utilized vasopneumatic compression to decrease edema / swelling for the purpose of improving mobility and quad tone / recruitment which will allow for increased overall function including but not limited to self-care, transfers, ambulation, and ascending / descending stairs. Charges:  Timed Code Treatment Minutes: 85   Total Treatment Minutes: 85     [] EVAL - LOW (67269)   [] EVAL - MOD (95660)  [] EVAL - HIGH (31745)  [] RE-EVAL (77161)  [] DANICA(22352) x       [] Ionto  [] NMR (56159) x       [] Vaso  [x] Manual (78768) x   4    [] Ultrasound  [x] TA x   2     [] Mech Traction (44869)  [] Aquatic Therapy x     [] ES (un) (29812):   [] Home Management Training x  [] ES(attended) (09807)   [] Dry Needling 1-2 muscles (70239):  [] Dry Needling 3+ muscles (698737  [] Group:      [] Other:     GOALS:   GOALS:  Patient stated goal: \"get rid of swelling\"  [x] Progressing: [] Met: [] Not Met: [] Adjusted    Therapist goals for Patient:   Short Term Goals: To be achieved in: 2 weeks  1. Independent in HEP and progression per patient tolerance, in order to prevent return of swelling   [] Progressing: [x] Met: [] Not Met: [] Adjusted  2. Patient will have a decrease in swelling/pain to facilitate improvement in movement, function, and ADLs as indicated by improvement with LLIS. [] Progressing: [x] Met: [] Not Met: [] Adjusted    Long Term Goals: To be achieved in: 6 weeks  1. Disability index score of 10% or less on the LLIS to assist with reaching prior level of function. [x] Progressing: [] Met: [] Not Met: [] Adjusted    2. Decrease swelling of B LE by at least 15 cm total limb volume so that pt can return to functional activities including walking up and down steps and getting in and out of bed without increased symptoms or restriction. [x] Progressing: [] Met: [] Not Met: [] Adjusted      Overall Progression Towards Functional goals/ Treatment Progress Update:  [x] Patient is progressing as expected towards functional goals listed.     [] Progression is

## 2021-07-12 NOTE — PROGRESS NOTES
Physical Therapy  Patient could benefit from above the knee compression garments at 30-40 mmHG to address continued B LE lymphedema. Please sign this note to act as a script for her to obtain them at a local medical supply store. Thank you!   Davonte Obrien DPT [id number]

## 2021-07-14 ENCOUNTER — HOSPITAL ENCOUNTER (OUTPATIENT)
Dept: PHYSICAL THERAPY | Age: 71
Setting detail: THERAPIES SERIES
Discharge: HOME | End: 2021-07-14
Payer: COMMERCIAL

## 2021-07-14 PROCEDURE — 97530 THERAPEUTIC ACTIVITIES: CPT

## 2021-07-14 PROCEDURE — 97140 MANUAL THERAPY 1/> REGIONS: CPT

## 2021-07-14 NOTE — FLOWSHEET NOTE
[address] [facility]  Phone: [phone]   Fax: [phone]    Date:  2021    Patient Name:  Brittany Zayas    :  1950  MRN: [id number]  Restrictions/Precautions:    Medical/Treatment Diagnosis Information:  · Diagnosis: Bilateral LE lymphedema     Insurance/Certification information:  PT Insurance Information: UMR---90/10 plan, medical necessity  Physician Information:  Referring Practitioner: Dr. Ramon Smith of care signed (Y/N): []  Yes [x]  No     Date of Patient follow up with Physician:     Functional scale[de-identified]  LLIS  raw score = 14/72 ; dysfunction = 19%    Progress Report: []  Yes  [x]  No     Date Range for reporting period:  Beginning   Ending     Progress report due (10 Rx/or 30 days whichever is less): visit #66 or 3/65     Recertification due (POC duration/ or 90 days whichever is less): visit #    Visit # Insurance Allowable Auth required? Date [place]   ;  [place] []  Yes  [x]  No         Latex Allergy:  [x]NO      []YES  Preferred Language for Healthcare:   [x]English       []other:      Pain level:  0/10     SUBJECTIVE:compression above the knee continues to help with pain management.       OBJECTIVE: 7/10:see measures below        RESTRICTIONS/PRECAUTIONS:     Exercises/Interventions:     Therapeutic Exercises (15958) Resistance / level Sets/sec Reps Notes          Pt educated on exercises to stimulate lymphatic flow                                 Therapeutic Activities (06254)  (Dynamic activities such as compression, designed to improve functional performance)  30 min     Compression: trial tensoshape size   applied to        Multilayer compression bandaging to   Stockinette  Artiflex  Foam at ankles anteriorly/laterally  8 cm low stretch compression bandage  10 cm low stretch compression bandage  cm low stretch compression bandage   cm low stretch compression bandage  Above the knee tensoshape X    X  X    X  BLE: applied above the knee tensoshape today and then wrapped comprilan without the foam below the knee as previous to attempt to get some compression above the knee; patient knows to remove if she notices any inc pain, n/t, skin discoloration of toes   Re-measure both legs                                   Home Management  (providing pt education on safety procedures/instructions)       Pt educated on compression as follows:   how to appropriately apply and wear compression  how to maintain appropriate gradient compression  do not sleep with compression garment/tensoshape  signs and symptoms of constriction   when to remove compression      Pt educated on lymphedema prevention and management        Educated on self node clearance                                   Neuromuscular Re-ed (32246)                            Manual Intervention (90524)  55 min     See MLD flowchart below--B LE  x                                          Manual Lymph Drainage (MLD):  MLD to B LE, clearing along alternate pathway to ipsilateral axillary lymph nodes    Clear Nodes 10x each   Neck x   [address] x   Axilla x   Abdomen x   Groin x   Popliteal x2 x   Clear Alternate Pathway 10x each   Re-clear alternate pathway   x5 each position x   Location Ipsilateral axilla       Fluid Mobilization 10x each   Re-clear alternate pathway   x5 each position x   Shoulder bracing    Location        Protein Resorption 10x each   Location        Clear Foot/Ankle or Hand 10x each   Achilles x   Bilateral malleolus x   Fan the cards x   Clear dorsum x   Clear through web space x   Clear toes/fingers    Fluid mobilization x   Re-clear all positions  X5 each x       GIRTH MEASUREMENT  (Tape on skin along medial aspect of LE)    Lower Extremity Right (cm) Left  (cm)   Date 7/10 7/10        cm  Widest at hip, measured in standing 476.1    cm at  umbilicus, measured in standing 118.7         Metatarsal heads 24.7 24.8   10Cm above inferior aspect of lat mall  27.5 26.8   20Cm abovinferior aspect of lat mall     40.0 38.8   30Cm abovinferior aspect of lat mall   48.6 48.5   40Cm aboveinferior aspect of lat mall  47.0 47.8   55Cm above inferior aspect of lat mall   62.1 60.8   65Cm above inferior aspect of lat mall 66.5 65.2                  Total Girth        316.4 cm    312.7 cm     total limb girth on 4/14 on R was 324.5 cm and on L was 324.3 cm  Total limb girth on 5/5 on R was 314.2 cm and on L was 317.7 cm  Total limb girth on 5/17 on R was 320.3 cm and on L was 319.4 cm  Total limb girth on 6/26 on R [id number] cm and on L was 316.3 cm    Modalities: 5/19: pump x 15 mins to B LE  5/7: vasopneumatic pump x 20 min B LE    OTHER: 4/30: advised patient to obtain 30-40 mm HG LE compression garment to the knee; tensoshape not providing enough compression between sessions and patient is struggling to apply wraps on her own. Pt education:   Pt educated on pathology and anatomy of etiology of lymphedema, condition precautions, indications for long term prognosis. Pt was educated on diagnosis; prognosis; PT POC including MLD, compression (role of multilayer bandaging/ compression garments), lymphedema management/prevention of flare ups, role of exercise, HEP, lymphedema pump; expectations for rehab. All pt questions were answered and handouts provided    HEP instruction: 4/19:added self node clearance to HEP  4/23: added pathway clearance swiping technique to HEP; discussed self wrapping using comprilan; handouts provided and patient demonstrated understanding  6/26: resume home wrapping, pump after removing today's wraps and then apply wraps again.  Continue diligence with home manual lymph drainage and pump daily       Therapeutic Exercise and NMR EXR  [] (18823) Provided verbal/tactile cueing for activities related to strengthening, flexibility, endurance, ROM for improvements in  [] LE / Lumbar: LE, proximal hip, and core control with self care, mobility, lifting, ambulation. [] UE / Cervical: cervical, postural, scapular, scapulothoracic and UE control with self care, reaching, carrying, lifting, house/yardwork, driving, computer work.  [] (98844) Provided verbal/tactile cueing for activities related to improving balance, coordination, kinesthetic sense, posture, motor skill, proprioception to assist with   [] LE / lumbar: LE, proximal hip, and core control in self care, mobility, lifting, ambulation and eccentric single leg control. [] UE / cervical: cervical, scapular, scapulothoracic and UE control with self care, reaching, carrying, lifting, house/yardwork, driving, computer work.   [] (12528) Therapist is in constant attendance of 2 or more patients providing skilled therapy interventions, but not providing any significant amount of measurable one-on-one time to either patient, for improvements in  [] LE / lumbar: LE, proximal hip, and core control in self care, mobility, lifting, ambulation and eccentric single leg control. [] UE / cervical: cervical, scapular, scapulothoracic and UE control with self care, reaching, carrying, lifting, house/yardwork, driving, computer work.      NMR and Therapeutic Activities:    [x] (12662 or 63438) Provided verbal/tactile cueing for activities related to improving balance, coordination, kinesthetic sense, posture, motor skill, proprioception and motor activation to allow for proper function of   [x] LE: / Lumbar core, proximal hip and LE with self care and ADLs  [] UE / Cervical: cervical, postural, scapular, scapulothoracic and UE control with self care, carrying, lifting, driving, computer work.   [] (76591) Gait Re-education- Provided training and instruction to the patient for proper LE, core and proximal hip recruitment and positioning and eccentric body weight control with ambulation re-education including up and down stairs     Home Management Training / Self Care:  [x] (52092) Provided self-care/home management training related to activities of daily living and compensatory training, and/or use of adaptive equipment for improvement with: ADLs and compensatory training, meal preparation, safety procedures and instruction in use of adaptive equipment, including bathing, grooming, dressing, personal hygiene, basic household cleaning and chores. Home Exercise Program:    [x] (22730) Reviewed/Progressed HEP activities related to strengthening, flexibility, endurance, ROM of   [] LE / Lumbar: core, proximal hip and LE for functional self-care, mobility, lifting and ambulation/stair navigation   [] UE / Cervical: cervical, postural, scapular, scapulothoracic and UE control with self care, reaching, carrying, lifting, house/yardwork, driving, computer work  [] (77490)Reviewed/Progressed HEP activities related to improving balance, coordination, kinesthetic sense, posture, motor skill, proprioception of   [] LE: core, proximal hip and LE for self care, mobility, lifting, and ambulation/stair navigation    [] UE / Cervical: cervical, postural,  scapular, scapulothoracic and UE control with self care, reaching, carrying, lifting, house/yardwork, driving, computer work    Manual Treatments:  PROM / STM / Oscillations-Mobs:  G-I, II, III, IV (PA's, Inf., Post.)  [x] (09356) Provided manual therapy to mobilize LE, proximal hip and/or LS spine soft tissue/joints for the purpose of modulating pain, promoting relaxation,  increasing ROM, reducing/eliminating soft tissue swelling/inflammation/restriction, improving soft tissue extensibility and allowing for proper ROM for normal function with   [x] LE / lumbar: self care, mobility, lifting and ambulation. [] UE / Cervical: self care, reaching, carrying, lifting, house/yardwork, driving, computer work.      Modalities:  [] (45544) Vasopneumatic compression: Utilized vasopneumatic compression to decrease edema / swelling for the purpose of improving mobility Progression has been slowed due to co-morbidities. [] Plan just implemented, too soon to assess goals progression <30days   [] Goals require adjustment due to lack of progress  [] Patient is not progressing as expected and requires additional follow up with physician  [] Other    Persisting Functional Limitations/Impairments:  []Sleeping []Sitting               [x]Standing [x]Transfers        [x]Walking []Kneeling               []Stairs []Squatting / bending   [x]ADLs []Reaching  []Lifting  []Housework  []Driving []Job related tasks  []Sports/Recreation [x]Other:donning/doffing shoe and pants        ASSESSMENT: measurements largely stable with some improvement noted bilaterally; pain level in lower legs related to continued swelling causing some difficulty with longer distance ambulation; recommending patient consider appointment at [address] [place] [address] for further assessment. Also changing plan to apply compression above the knee bilaterally to see if that will help   Treatment/Activity Tolerance:  [x] Patient able to complete tx [] Patient limited by fatigue  [] Patient limited by pain  [] Patient limited by other medical complications  [] Other:     Prognosis: [x] Good [] Fair  [] Poor    Patient Requires Follow-up: [x] Yes  [] No    Plan for next treatment session: continue MLD and multi-layer compression for B LE; MLD, compression, HEP, pt education, lymph pump as appropriate, exercise to stimulate lymphatic flow; needs above the knee compression garments    PLAN:  [x] Continue per plan of care [] Alter current plan (see comments)  [] Plan of care initiated [] Hold pending MD visit [] Discharge    Electronically signed by: Wing Leslie DPT [id number]    Note: If patient does not return for scheduled/ recommended follow up visits, this note will serve as a discharge from care along with most recent update on progress.

## 2021-07-15 RX ORDER — CHOLESTYRAMINE 4 G/9G
POWDER, FOR SUSPENSION ORAL
Qty: 180 PACKET | Refills: 1 | Status: SHIPPED | OUTPATIENT
Start: 2021-07-15 | End: 2022-04-25

## 2021-07-28 ENCOUNTER — HOSPITAL ENCOUNTER (OUTPATIENT)
Dept: PHYSICAL THERAPY | Age: 71
Setting detail: THERAPIES SERIES
Discharge: HOME | End: 2021-07-28
Payer: COMMERCIAL

## 2021-07-28 PROCEDURE — 97530 THERAPEUTIC ACTIVITIES: CPT

## 2021-07-28 PROCEDURE — 97140 MANUAL THERAPY 1/> REGIONS: CPT

## 2021-07-28 NOTE — FLOWSHEET NOTE
[address] [facility]  Phone: [phone]   Fax: [phone]    Date:  2021    Patient Name:  Chloé Khan    :  1950  MRN: [id number]  Restrictions/Precautions:    Medical/Treatment Diagnosis Information:  · Diagnosis: Bilateral LE lymphedema     Insurance/Certification information:  PT Insurance Information: UMR---90/10 plan, medical necessity  Physician Information:  Referring Practitioner: Dr. Geo Newby of care signed (Y/N): []  Yes [x]  No     Date of Patient follow up with Physician:     Functional scale[de-identified]  LLIS  raw score = 14 ; dysfunction = 19%    Progress Report: []  Yes  [x]  No     Date Range for reporting period:  Beginning   Ending     Progress report due (10 Rx/or 30 days whichever is less): visit #81 or      Recertification due (POC duration/ or 90 days whichever is less): visit #    Visit # Insurance Allowable Auth required? Date [place]   ;  [place] []  Yes  [x]  No         Latex Allergy:  [x]NO      []YES  Preferred Language for Healthcare:   [x]English       []other:      Pain level:  0/10     SUBJECTIVE:has received but not tried her permanent over the knee compression garments. Has also received but not tried the 12 cm comprilan so that she can be wrapped above the knee. Was waiting for this visit to try it and for instruction.       OBJECTIVE: 7/10:see measures below        RESTRICTIONS/PRECAUTIONS:     Exercises/Interventions:     Therapeutic Exercises (54605) Resistance / level Sets/sec Reps Notes          Pt educated on exercises to stimulate lymphatic flow                                 Therapeutic Activities (75417)  (Dynamic activities such as compression, designed to improve functional performance)  30 min     Compression: trial tensoshape size   applied to        Multilayer compression bandaging to   Stockinette  Artiflex  Foam at ankles anteriorly/laterally  8 cm low stretch compression bandage  10 cm low stretch compression bandage  cm low stretch compression bandage   12cm low stretch compression bandage    Above the knee tensoshape            X    X  X  x    X  BLE: applied above the knee tensoshape today and then wrapped comprilan without the foam above the knee bilaterally; patient knows to remove if she notices any inc pain, n/t, skin discoloration of toes   Re-measure both legs                                   Home Management  (providing pt education on safety procedures/instructions)       Pt educated on compression as follows:   how to appropriately apply and wear compression  how to maintain appropriate gradient compression  do not sleep with compression garment/tensoshape  signs and symptoms of constriction   when to remove compression      Pt educated on lymphedema prevention and management        Educated on self node clearance                                   Neuromuscular Re-ed (16791)                            Manual Intervention (84049)  60 min     See MLD flowchart below--B LE  x                                          Manual Lymph Drainage (MLD):  MLD to B LE, clearing along alternate pathway to ipsilateral axillary lymph nodes    Clear Nodes 10x each   Neck x   [address] x   Axilla x   Abdomen x   Groin x   Popliteal x2 x   Clear Alternate Pathway 10x each   Re-clear alternate pathway   x5 each position x   Location Ipsilateral axilla       Fluid Mobilization 10x each   Re-clear alternate pathway   x5 each position x   Shoulder bracing    Location        Protein Resorption 10x each   Location        Clear Foot/Ankle or Hand 10x each   Achilles x   Bilateral malleolus x   Fan the cards x   Clear dorsum x   Clear through web space x   Clear toes/fingers    Fluid mobilization x   Re-clear all positions  X5 each x       GIRTH MEASUREMENT  (Tape on skin along medial aspect of LE)    Lower Extremity Right (cm) Left  (cm)   Date 7/10 7/10        cm  Widest at hip, measured in standing 291.7    cm at  umbilicus, measured in standing 118.7         Metatarsal heads 24.7 24.8   10Cm above inferior aspect of lat mall  27.5 26.8   20Cm abovinferior aspect of lat mall     40.0 38.8   30Cm abovinferior aspect of lat mall   48.6 48.5   40Cm aboveinferior aspect of lat mall  47.0 47.8   55Cm above inferior aspect of lat mall   62.1 60.8   65Cm above inferior aspect of lat mall 66.5 65.2                  Total Girth        316.4 cm    312.7 cm     total limb girth on 4/14 on R was 324.5 cm and on L was 324.3 cm  Total limb girth on 5/5 on R was 314.2 cm and on L was 317.7 cm  Total limb girth on 5/17 on R was 320.3 cm and on L was 319.4 cm  Total limb girth on 6/26 on R [id number] cm and on L was 316.3 cm    Modalities: 5/19: pump x 15 mins to B LE  5/7: vasopneumatic pump x 20 min B LE    OTHER: 4/30: advised patient to obtain 30-40 mm HG LE compression garment to the knee; tensoshape not providing enough compression between sessions and patient is struggling to apply wraps on her own. Pt education:   Pt educated on pathology and anatomy of etiology of lymphedema, condition precautions, indications for long term prognosis. Pt was educated on diagnosis; prognosis; PT POC including MLD, compression (role of multilayer bandaging/ compression garments), lymphedema management/prevention of flare ups, role of exercise, HEP, lymphedema pump; expectations for rehab. All pt questions were answered and handouts provided    HEP instruction: 4/19:added self node clearance to HEP  4/23: added pathway clearance swiping technique to HEP; discussed self wrapping using comprilan; handouts provided and patient demonstrated understanding  6/26: resume home wrapping, pump after removing today's wraps and then apply wraps again.  Continue diligence with home manual lymph drainage and pump daily       Therapeutic Exercise and NMR EXR  [] (48564) Provided verbal/tactile cueing for activities related to strengthening, flexibility, endurance, ROM for improvements in  [] LE / Lumbar: LE, proximal hip, and core control with self care, mobility, lifting, ambulation. [] UE / Cervical: cervical, postural, scapular, scapulothoracic and UE control with self care, reaching, carrying, lifting, house/yardwork, driving, computer work.  [] (42927) Provided verbal/tactile cueing for activities related to improving balance, coordination, kinesthetic sense, posture, motor skill, proprioception to assist with   [] LE / lumbar: LE, proximal hip, and core control in self care, mobility, lifting, ambulation and eccentric single leg control. [] UE / cervical: cervical, scapular, scapulothoracic and UE control with self care, reaching, carrying, lifting, house/yardwork, driving, computer work.   [] (80310) Therapist is in constant attendance of 2 or more patients providing skilled therapy interventions, but not providing any significant amount of measurable one-on-one time to either patient, for improvements in  [] LE / lumbar: LE, proximal hip, and core control in self care, mobility, lifting, ambulation and eccentric single leg control. [] UE / cervical: cervical, scapular, scapulothoracic and UE control with self care, reaching, carrying, lifting, house/yardwork, driving, computer work.      NMR and Therapeutic Activities:    [x] (01806 or 30730) Provided verbal/tactile cueing for activities related to improving balance, coordination, kinesthetic sense, posture, motor skill, proprioception and motor activation to allow for proper function of   [x] LE: / Lumbar core, proximal hip and LE with self care and ADLs  [] UE / Cervical: cervical, postural, scapular, scapulothoracic and UE control with self care, carrying, lifting, driving, computer work.   [] (61015) Gait Re-education- Provided training and instruction to the patient for proper LE, core and proximal hip recruitment and positioning and eccentric body weight control with ambulation re-education including up and down stairs     Home Management Training / Self Care:  [x] (34332) Provided self-care/home management training related to activities of daily living and compensatory training, and/or use of adaptive equipment for improvement with: ADLs and compensatory training, meal preparation, safety procedures and instruction in use of adaptive equipment, including bathing, grooming, dressing, personal hygiene, basic household cleaning and chores. Home Exercise Program:    [x] (16708) Reviewed/Progressed HEP activities related to strengthening, flexibility, endurance, ROM of   [] LE / Lumbar: core, proximal hip and LE for functional self-care, mobility, lifting and ambulation/stair navigation   [] UE / Cervical: cervical, postural, scapular, scapulothoracic and UE control with self care, reaching, carrying, lifting, house/yardwork, driving, computer work  [] (79160)Reviewed/Progressed HEP activities related to improving balance, coordination, kinesthetic sense, posture, motor skill, proprioception of   [] LE: core, proximal hip and LE for self care, mobility, lifting, and ambulation/stair navigation    [] UE / Cervical: cervical, postural,  scapular, scapulothoracic and UE control with self care, reaching, carrying, lifting, house/yardwork, driving, computer work    Manual Treatments:  PROM / STM / Oscillations-Mobs:  G-I, II, III, IV (PA's, Inf., Post.)  [x] (07790) Provided manual therapy to mobilize LE, proximal hip and/or LS spine soft tissue/joints for the purpose of modulating pain, promoting relaxation,  increasing ROM, reducing/eliminating soft tissue swelling/inflammation/restriction, improving soft tissue extensibility and allowing for proper ROM for normal function with   [x] LE / lumbar: self care, mobility, lifting and ambulation. [] UE / Cervical: self care, reaching, carrying, lifting, house/yardwork, driving, computer work.      Modalities:  [] (02351) Vasopneumatic

## 2021-08-04 ENCOUNTER — HOSPITAL ENCOUNTER (OUTPATIENT)
Dept: PHYSICAL THERAPY | Age: 71
Setting detail: THERAPIES SERIES
Discharge: HOME | End: 2021-08-04
Payer: COMMERCIAL

## 2021-08-04 PROCEDURE — 97140 MANUAL THERAPY 1/> REGIONS: CPT

## 2021-08-04 PROCEDURE — 97530 THERAPEUTIC ACTIVITIES: CPT

## 2021-08-04 NOTE — FLOWSHEET NOTE
[facility]  Phone: [phone]   Fax: [phone]   Physical Therapy Discharge Summary    Dear Jamee Adorno  ,    We had the pleasure of treating the following patient for physical therapy services at [facility]. A summary of our findings can be found in the discharge summary below. If you have any questions or concerns regarding these findings, please do not hesitate to contact me at the office phone number checked above. Thank you for the referral.     Physician Signature:________________________________Date:__________________  By signing above (or electronic signature), therapists plan is approved by physician      Functional Outcome: LLIS:                                                     Overall Response to Treatment:   [x]Patient is responding well to treatment and improvement is noted with regards to goals   []Patient should continue to improve in reasonable time if they continue HEP   []Patient has plateaued and is no longer responding to skilled PT intervention    []Patient is getting worse and would benefit from return to referring MD   []Patient unable to adhere to initial POC   []Other:     Date range of Visits:  to   Total Visits: 20    Recommendation:    [x] Discharge to [facility]. Follow up with PT or MD PRN.              Date:  2021    Patient Name:  Chloé Khan    :  1950  MRN: [id number]  Restrictions/Precautions:    Medical/Treatment Diagnosis Information:  · Diagnosis: Bilateral LE lymphedema     Insurance/Certification information:  PT Insurance Information: UMR---90/10 plan, medical necessity  Physician Information:  Referring Practitioner: Dr. Geo Newby of care signed (Y/N): [x]  Yes []  No     Date of Patient follow up with Physician:     Functional scale[de-identified] 8/5  LLIS  raw score =  ; dysfunction = 15%    Progress Report: [x]  Yes---Discharge  [] No     Date Range for reporting period:  Beginning 4/14  Ending 5/14    Progress report due (10 Rx/or 30 days whichever is less): visit #88 or 7/28     Recertification due (POC duration/ or 90 days whichever is less): visit #    Visit # Insurance Allowable Auth required? Date Range   12/12; 8/8 [place] []  Yes  [x]  No         Latex Allergy:  [x]NO      []YES  Preferred Language for Healthcare:   [x]English       []other:      Pain level:  0/10     SUBJECTIVE:successfully wearing above the knee compression garment. Pain in B LE well managed, though patient does report some increased pain in her back and knees (she believes from arthritis). Reports compliance with home pump and MLD.       OBJECTIVE: 8/4: see measures taken below        RESTRICTIONS/PRECAUTIONS:     Exercises/Interventions:     Therapeutic Exercises (28251) Resistance / level Sets/sec Reps Notes          Pt educated on exercises to stimulate lymphatic flow                                 Therapeutic Activities (46322)  (Dynamic activities such as compression, designed to improve functional performance)  30 min     Compression: trial tensoshape size   applied to        Multilayer compression bandaging to   Stockinette  Artiflex  Foam at ankles anteriorly/laterally  8 cm low stretch compression bandage  10 cm low stretch compression bandage  cm low stretch compression bandage   12cm low stretch compression bandage    Above the knee tensoshape            X    X  X  x    X  BLE: applied above the knee tensoshape today and then wrapped comprilan without the foam above the knee bilaterally; patient knows to remove if she notices any inc pain, n/t, skin discoloration of toes   Re-measure both legs; review of goals and D/c instructions  15 min                                 Home Management  (providing pt education on safety procedures/instructions)       Pt educated on compression as follows:   how to appropriately apply and wear compression  how to maintain appropriate gradient compression  do not sleep with compression garment/tensoshape  signs and symptoms of constriction   when to remove compression      Pt educated on lymphedema prevention and management        Educated on self node clearance                                   Neuromuscular Re-ed (33354)                            Manual Intervention (63042)  60 min     See MLD flowchart below--B LE  x                                          Manual Lymph Drainage (MLD):  MLD to B LE, clearing along alternate pathway to ipsilateral axillary lymph nodes    Clear Nodes 10x each   Neck x   [address] x   Axilla x   Abdomen x   Groin x   Popliteal x2 x   Clear Alternate Pathway 10x each   Re-clear alternate pathway   x5 each position x   Location Ipsilateral axilla       Fluid Mobilization 10x each   Re-clear alternate pathway   x5 each position x   Shoulder bracing    Location        Protein Resorption 10x each   Location        Clear Foot/Ankle or Hand 10x each   Achilles x   Bilateral malleolus x   Fan the cards x   Clear dorsum x   Clear through web space x   Clear toes/fingers    Fluid mobilization x   Re-clear all positions  X5 each x       GIRTH MEASUREMENT  (Tape on skin along medial aspect of LE)    Lower Extremity Right (cm) Left  (cm)   Date 8/4 8/4        cm  Widest at hip, measured in standing 669.4    cm at  umbilicus, measured in standing 118.7         Metatarsal heads 24.5 24.1   10Cm above inferior aspect of lat mall  25.6 26.0   20Cm abovinferior aspect of lat mall     36.0 35.2   30Cm abovinferior aspect of lat mall   47.1 46.4   40Cm aboveinferior aspect of lat mall  47.3 48.6   55Cm above inferior aspect of lat mall   59.1 58.0   65Cm above inferior aspect of lat mall 64.1 64.0                  Total Girth        303.7cm    302.3 cm     total limb girth on 4/14 on R was 324.5 cm and on L was 324.3 cm  Total limb girth on 5/5 on R was 314.2 cm and on L was 317.7 cm  Total limb girth on 5/17 on R was 320.3 cm and on L was 319.4 cm  Total limb girth on 6/26 on R [id number] cm and on L was 316.3 cm  Total limb girth on 7/10 on R was 316.4 cm and on L was 312.7 cm    Modalities: 5/19: pump x 15 mins to B LE  5/7: vasopneumatic pump x 20 min B LE    OTHER: 4/30: advised patient to obtain 30-40 mm HG LE compression garment to the knee; tensoshape not providing enough compression between sessions and patient is struggling to apply wraps on her own. Pt education:   Pt educated on pathology and anatomy of etiology of lymphedema, condition precautions, indications for long term prognosis. Pt was educated on diagnosis; prognosis; PT POC including MLD, compression (role of multilayer bandaging/ compression garments), lymphedema management/prevention of flare ups, role of exercise, HEP, lymphedema pump; expectations for rehab. All pt questions were answered and handouts provided    HEP instruction: 4/19:added self node clearance to HEP  4/23: added pathway clearance swiping technique to HEP; discussed self wrapping using comprilan; handouts provided and patient demonstrated understanding  6/26: resume home wrapping, pump after removing today's wraps and then apply wraps again. Continue diligence with home manual lymph drainage and pump daily       Therapeutic Exercise and NMR EXR  [] (75110) Provided verbal/tactile cueing for activities related to strengthening, flexibility, endurance, ROM for improvements in  [] LE / Lumbar: LE, proximal hip, and core control with self care, mobility, lifting, ambulation.   [] UE / Cervical: cervical, postural, scapular, scapulothoracic and UE control with self care, reaching, carrying, lifting, house/yardwork, driving, computer work.  [] (22047) Provided verbal/tactile cueing for activities related to improving balance, coordination, kinesthetic sense, posture, motor skill, proprioception to assist with   [] LE / lumbar: LE, proximal hip, and core control in self care, mobility, lifting, ambulation and eccentric single leg control. [] UE / cervical: cervical, scapular, scapulothoracic and UE control with self care, reaching, carrying, lifting, house/yardwork, driving, computer work.   [] (41018) Therapist is in constant attendance of 2 or more patients providing skilled therapy interventions, but not providing any significant amount of measurable one-on-one time to either patient, for improvements in  [] LE / lumbar: LE, proximal hip, and core control in self care, mobility, lifting, ambulation and eccentric single leg control. [] UE / cervical: cervical, scapular, scapulothoracic and UE control with self care, reaching, carrying, lifting, house/yardwork, driving, computer work. NMR and Therapeutic Activities:    [x] (00088 or 72888) Provided verbal/tactile cueing for activities related to improving balance, coordination, kinesthetic sense, posture, motor skill, proprioception and motor activation to allow for proper function of   [x] LE: / Lumbar core, proximal hip and LE with self care and ADLs  [] UE / Cervical: cervical, postural, scapular, scapulothoracic and UE control with self care, carrying, lifting, driving, computer work.   [] (87261) Gait Re-education- Provided training and instruction to the patient for proper LE, core and proximal hip recruitment and positioning and eccentric body weight control with ambulation re-education including up and down stairs     Home Management Training / Self Care:  [x] (94186) Provided self-care/home management training related to activities of daily living and compensatory training, and/or use of adaptive equipment for improvement with: ADLs and compensatory training, meal preparation, safety procedures and instruction in use of adaptive equipment, including bathing, grooming, dressing, personal hygiene, basic household cleaning and chores.      Home Exercise Program:    [x] (58893) Reviewed/Progressed HEP activities related to strengthening, flexibility, endurance, ROM of   [] LE / Lumbar: core, proximal hip and LE for functional self-care, mobility, lifting and ambulation/stair navigation   [] UE / Cervical: cervical, postural, scapular, scapulothoracic and UE control with self care, reaching, carrying, lifting, house/yardwork, driving, computer work  [] (99963)Reviewed/Progressed HEP activities related to improving balance, coordination, kinesthetic sense, posture, motor skill, proprioception of   [] LE: core, proximal hip and LE for self care, mobility, lifting, and ambulation/stair navigation    [] UE / Cervical: cervical, postural,  scapular, scapulothoracic and UE control with self care, reaching, carrying, lifting, house/yardwork, driving, computer work    Manual Treatments:  PROM / STM / Oscillations-Mobs:  G-I, II, III, IV (PA's, Inf., Post.)  [x] (17500) Provided manual therapy to mobilize LE, proximal hip and/or LS spine soft tissue/joints for the purpose of modulating pain, promoting relaxation,  increasing ROM, reducing/eliminating soft tissue swelling/inflammation/restriction, improving soft tissue extensibility and allowing for proper ROM for normal function with   [x] LE / lumbar: self care, mobility, lifting and ambulation. [] UE / Cervical: self care, reaching, carrying, lifting, house/yardwork, driving, computer work. Modalities:  [] (60239) Vasopneumatic compression: Utilized vasopneumatic compression to decrease edema / swelling for the purpose of improving mobility and quad tone / recruitment which will allow for increased overall function including but not limited to self-care, transfers, ambulation, and ascending / descending stairs.        Charges:  Timed Code Treatment Minutes: 120   Total Treatment Minutes: 120     [] EVAL - LOW (03989)   [] EVAL - MOD (82741)  [] EVAL - HIGH (38557)  [] RE-EVAL (22669)  [] WF(17369) x       [] Ionto  [] NMR (90093) x       [] Vaso  [x] Manual (82737) x   4    [] Ultrasound  [x] TA x   3     [] [facility] ([facility]  [] Aquatic Therapy x     [] ES (un) (20286):   [] Home Management Training x  [] ES(attended) (95978)   [] Dry Needling 1-2 muscles (90858):  [] Dry Needling 3+ muscles (988902  [] Group:      [] Other:     GOALS:   GOALS:  Patient stated goal: \"get rid of swelling\"  [x] Progressing: [] Met: [] Not Met: [] Adjusted    Therapist goals for Patient:   Short Term Goals: To be achieved in: 2 weeks  1. Independent in HEP and progression per patient tolerance, in order to prevent return of swelling   [] Progressing: [x] Met: [] Not Met: [] Adjusted  2. Patient will have a decrease in swelling/pain to facilitate improvement in movement, function, and ADLs as indicated by improvement with LLIS. [] Progressing: [x] Met: [] Not Met: [] Adjusted    Long Term Goals: To be achieved in: 6 weeks  1. Disability index score of 10% or less on the LLIS to assist with reaching prior level of function. [x] Progressing: [] Met: [] Not Met: [] Adjusted    2. Decrease swelling of B LE by at least 15 cm total limb volume so that pt can return to functional activities including walking up and down steps and getting in and out of bed without increased symptoms or restriction. [] Progressing: [x] Met: [] Not Met: [] Adjusted      Overall Progression Towards Functional goals/ Treatment Progress Update:  [x] Patient is progressing as expected towards functional goals listed. [] Progression is slowed due to complexities/Impairments listed. [] Progression has been slowed due to co-morbidities.   [] Plan just implemented, too soon to assess goals progression <30days   [] Goals require adjustment due to lack of progress  [] Patient is not progressing as expected and requires additional follow up with physician  [] Other    Persisting Functional Limitations/Impairments:  []Sleeping []Sitting               [x]Standing [x]Transfers        [x]Walking []Kneeling               []Stairs []Squatting / bending   [x]ADLs []Reaching  []Lifting  []Housework  []Driving []Job related tasks  []Sports/Recreation [x]Other:donning/doffing shoe and pants        ASSESSMENT: significant improvement in limb girth B;  Patient indep with use of compression, exercise, lymph pump, and MLD for management; discharge PT   Treatment/Activity Tolerance:  [x] Patient able to complete tx [] Patient limited by fatigue  [] Patient limited by pain  [] Patient limited by other medical complications  [] Other:     Prognosis: [x] Good [] Fair  [] Poor    Patient Requires Follow-up: [] Yes  [x] No    Plan for next treatment session:     PLAN:  [] Continue per plan of care [] Alter current plan (see comments)  [] Plan of care initiated [] Hold pending MD visit [x] Discharge    Electronically signed by: Ortega Arndt DPT [id number]    Note: If patient does not return for scheduled/ recommended follow up visits, this note will serve as a discharge from care along with most recent update on progress.

## 2021-09-13 ENCOUNTER — TELEPHONE (OUTPATIENT)
Dept: INTERNAL MEDICINE CLINIC | Age: 71
End: 2021-09-13

## 2021-09-13 DIAGNOSIS — J32.9 SINUSITIS, UNSPECIFIED CHRONICITY, UNSPECIFIED LOCATION: Primary | ICD-10-CM

## 2021-09-13 RX ORDER — METHYLPREDNISOLONE 4 MG/1
TABLET ORAL
Qty: 1 KIT | Refills: 0 | Status: SHIPPED | OUTPATIENT
Start: 2021-09-13 | End: 2021-09-19

## 2021-09-13 RX ORDER — AZITHROMYCIN 250 MG/1
250 TABLET, FILM COATED ORAL SEE ADMIN INSTRUCTIONS
Qty: 6 TABLET | Refills: 0 | Status: SHIPPED | OUTPATIENT
Start: 2021-09-13 | End: 2021-09-18

## 2021-09-13 NOTE — TELEPHONE ENCOUNTER
Pt called into the office to either request a medication or an appointment for her cold like symptoms. Pt tested negative covid. Pt  Has been experiencing coughing, sneezing, running nose and stuffy head. Pt does not have a fever or sore throat. Please advise. Please call with more information.

## 2021-09-17 NOTE — TELEPHONE ENCOUNTER
Start cholestyramine powder    One can          One scoop in water twice a day Improved   P/w generalized weakness likely due chemo, electrolyte abnormalities + cancer  PT eval reccs: Outpatient PT  Pending Lumbar spine MRI - renal cell ca with mets to lung on CT, on sunitinib at home  - hold sunitinib for now as pt was instructed by heme/onc dr Smyth to hold x1 week (last dose 9/12)  - CT AP Shows:   Left renal lesion measuring 2.2 cm, likely corresponding with renal cancer  - MRI with No acute compression fracture or subluxation. No cord compression  pending lumbar spine MRI   - Heme/Onc QMA following  - Nephro Dr. Urrutia

## 2021-09-27 ENCOUNTER — TELEPHONE (OUTPATIENT)
Dept: INTERNAL MEDICINE CLINIC | Age: 71
End: 2021-09-27

## 2021-09-27 NOTE — TELEPHONE ENCOUNTER
----- Message from Marc Sierra sent at 9/27/2021 10:53 AM EDT -----  Subject: Appointment Request    Reason for Call: Routine Pre-Op    QUESTIONS  Type of Appointment? Established Patient  Reason for appointment request? No appointments available during search  Additional Information for Provider? Patient needs physical for upcoming   cataracts surgery scheduled for Dec 1 and Dec 15. Needs appointment for   Nov 15 for before surgery. No appointments are available when performing   search. Patient wants to know if they need blood work prior to surgery as   well. Patient will be at work number for today only. If calling tomorrow   please contact via home phone.  ---------------------------------------------------------------------------  --------------  [address]  What is the best way for the office to contact you? OK to leave message on   voicemail  Preferred Call Back Phone Number? [phone]  ---------------------------------------------------------------------------  --------------  SCRIPT ANSWERS  Relationship to Patient? Self  Do you have questions for your provider that need to be answered prior to   scheduling your pre-op appointment? No  Have you been diagnosed with, awaiting test results for, or told that you   are suspected of having COVID-19 (Coronavirus)? (If patient has tested   negative or was tested as a requirement for work, school, or travel and   not based on symptoms, answer no)? No  Within the past two weeks have you developed any of the following symptoms   (answer no if symptoms have been present longer than 2 weeks or began   more than 2 weeks ago)? Fever or Chills, Cough, Shortness of breath or   difficulty breathing, Loss of taste or smell, Sore throat, Nasal   congestion, Sneezing or runny nose, Fatigue or generalized body aches   (answer no if pain is specific to a body part e.g. back pain), Diarrhea,   Headache?  Yes

## 2021-11-22 ENCOUNTER — OFFICE VISIT (OUTPATIENT)
Dept: INTERNAL MEDICINE CLINIC | Age: 71
End: 2021-11-22
Payer: COMMERCIAL

## 2021-11-22 VITALS
SYSTOLIC BLOOD PRESSURE: 134 MMHG | BODY MASS INDEX: 39.8 KG/M2 | HEART RATE: 59 BPM | HEIGHT: 67 IN | WEIGHT: 253.6 LBS | DIASTOLIC BLOOD PRESSURE: 70 MMHG | TEMPERATURE: 97.2 F | OXYGEN SATURATION: 98 %

## 2021-11-22 DIAGNOSIS — Z00.00 PREVENTATIVE HEALTH CARE: ICD-10-CM

## 2021-11-22 DIAGNOSIS — Z01.818 PRE-OP EVALUATION: Primary | ICD-10-CM

## 2021-11-22 DIAGNOSIS — Z01.818 PRE-OP EVALUATION: ICD-10-CM

## 2021-11-22 LAB
A/G RATIO: 2 (ref 1.1–2.2)
ALBUMIN SERPL-MCNC: 4.7 G/DL (ref 3.4–5)
ALP BLD-CCNC: 126 U/L (ref 40–129)
ALT SERPL-CCNC: 14 U/L (ref 10–40)
ANION GAP SERPL CALCULATED.3IONS-SCNC: 17 MMOL/L (ref 3–16)
AST SERPL-CCNC: 15 U/L (ref 15–37)
BILIRUB SERPL-MCNC: 0.5 MG/DL (ref 0–1)
BUN BLDV-MCNC: 11 MG/DL (ref 7–20)
CALCIUM SERPL-MCNC: 9.5 MG/DL (ref 8.3–10.6)
CHLORIDE BLD-SCNC: 104 MMOL/L (ref 99–110)
CHOLESTEROL, TOTAL: 165 MG/DL (ref 0–199)
CO2: 23 MMOL/L (ref 21–32)
CREAT SERPL-MCNC: 0.5 MG/DL (ref 0.6–1.2)
GFR AFRICAN AMERICAN: >60
GFR NON-AFRICAN AMERICAN: >60
GLUCOSE BLD-MCNC: 91 MG/DL (ref 70–99)
HCT VFR BLD CALC: 42 % (ref 36–48)
HDLC SERPL-MCNC: 53 MG/DL (ref 40–60)
HEMOGLOBIN: 13.4 G/DL (ref 12–16)
LDL CHOLESTEROL CALCULATED: 87 MG/DL
MCH RBC QN AUTO: 27.7 PG (ref 26–34)
MCHC RBC AUTO-ENTMCNC: 31.9 G/DL (ref 31–36)
MCV RBC AUTO: 86.8 FL (ref 80–100)
PDW BLD-RTO: 15.6 % (ref 12.4–15.4)
PLATELET # BLD: 259 K/UL (ref 135–450)
PMV BLD AUTO: 8.2 FL (ref 5–10.5)
POTASSIUM SERPL-SCNC: 4 MMOL/L (ref 3.5–5.1)
RBC # BLD: 4.83 M/UL (ref 4–5.2)
SODIUM BLD-SCNC: 144 MMOL/L (ref 136–145)
TOTAL PROTEIN: 7 G/DL (ref 6.4–8.2)
TRIGL SERPL-MCNC: 123 MG/DL (ref 0–150)
TSH REFLEX: 0.68 UIU/ML (ref 0.27–4.2)
VLDLC SERPL CALC-MCNC: 25 MG/DL
WBC # BLD: 6.1 K/UL (ref 4–11)

## 2021-11-22 PROCEDURE — 99214 OFFICE O/P EST MOD 30 MIN: CPT | Performed by: HOSPITALIST

## 2021-11-22 RX ORDER — OFLOXACIN 3 MG/ML
SOLUTION/ DROPS OPHTHALMIC
COMMUNITY
Start: 2021-11-15 | End: 2022-05-18 | Stop reason: ALTCHOICE

## 2021-11-22 RX ORDER — TORSEMIDE 20 MG/1
20 TABLET ORAL EVERY OTHER DAY
COMMUNITY
End: 2021-12-27

## 2021-11-22 RX ORDER — DICLOFENAC SODIUM 1 MG/ML
SOLUTION/ DROPS OPHTHALMIC
COMMUNITY
Start: 2021-11-15 | End: 2022-05-18 | Stop reason: ALTCHOICE

## 2021-11-22 RX ORDER — PREDNISOLONE ACETATE 10 MG/ML
SUSPENSION/ DROPS OPHTHALMIC
COMMUNITY
Start: 2021-11-15 | End: 2022-05-18 | Stop reason: ALTCHOICE

## 2021-11-22 SDOH — ECONOMIC STABILITY: FOOD INSECURITY: WITHIN THE PAST 12 MONTHS, THE FOOD YOU BOUGHT JUST DIDN'T LAST AND YOU DIDN'T HAVE MONEY TO GET MORE.: NEVER TRUE

## 2021-11-22 SDOH — ECONOMIC STABILITY: FOOD INSECURITY: WITHIN THE PAST 12 MONTHS, YOU WORRIED THAT YOUR FOOD WOULD RUN OUT BEFORE YOU GOT MONEY TO BUY MORE.: NEVER TRUE

## 2021-11-22 ASSESSMENT — PATIENT HEALTH QUESTIONNAIRE - PHQ9
SUM OF ALL RESPONSES TO PHQ QUESTIONS 1-9: 0
2. FEELING DOWN, DEPRESSED OR HOPELESS: 0
SUM OF ALL RESPONSES TO PHQ QUESTIONS 1-9: 0
SUM OF ALL RESPONSES TO PHQ9 QUESTIONS 1 & 2: 0
SUM OF ALL RESPONSES TO PHQ QUESTIONS 1-9: 0
1. LITTLE INTEREST OR PLEASURE IN DOING THINGS: 0

## 2021-11-22 ASSESSMENT — SOCIAL DETERMINANTS OF HEALTH (SDOH): HOW HARD IS IT FOR YOU TO PAY FOR THE VERY BASICS LIKE FOOD, HOUSING, MEDICAL CARE, AND HEATING?: NOT HARD AT ALL

## 2021-11-22 NOTE — PROGRESS NOTES
[facility]  Preoperative visit   11/22/2021    Patient is here for preop evaluation at the request of Dr. Tracey Mccormick for cataract surgery . She is scheduled for surgery on 12/1/2021(L) and 12/15/2021(R). Functional Assessment:  Exercise tolerance: 5.0 METS using the DASI calculator   Denies any current chest pain or discomfort, sob or JONES, orthopnea, PND or leg swelling. Medications: reviewed, Over the counter (NSAIDs) use: None. No ASA. Cardiac Risk:  High-risk Surgery: No / Hx of ischemic heart disease: No / Hx of CHF:No / Hx of CVA:No / Pre-op insulin: No / Pre-op creatinine >2.0: No  (last cr 1.0 2/4/2021 )    NARAYAN Screen:  Snore loudly? No /  Feel tired/fatigued during the day? No  /  Stop breathing while sleeping? No / High blood pressure? No / Morning HA? No    History of surgery/ anesthesia:  - No hx of complications, anesthesia reaction, bleeding, bruising, clots  - No family Hx of reactions to anesthesia/ bleeding/clots  - No loose teeth/ dentures      - Support systems after surgery. Good. Has close friends. - Smoking/ alcohol/drugs. No smoking or alcohol use. - Complicating medical diseases are currently well controlled.  Yes      Problem List  Patient Active Problem List   Diagnosis    Hypertension    Migraine headache    Edema    Loose stools    Microhematuria    Vitamin D deficiency    Esophageal dysphagia    Degeneration of meniscus of right knee    Degenerative arthritis of right knee    Chronic low back pain       Medical and Surgical History  Past Medical History:   Diagnosis Date    Acquired lymphedema of lower extremity     Esophageal reflux     Flushing     HTN (hypertension)     Idiopathic chronic venous hypertension of left lower extremity with inflammation     Labyrinthitis, unspecified     Lumbar disc disease     traumatic fall    Migraine     Pain in limb     left leg    Screening mammogram 02/29/2008    (Both)Negative     Past Surgical History:   Procedure Laterality Date    APPENDECTOMY      CHOLECYSTECTOMY      COLONOSCOPY  11/2014    negative - Dr Tricia Montiel ENDOSCOPY  09/2019    Small sliding hiatal hernia       Medications  Prior to Visit Medications    Medication Sig Taking? Authorizing Provider   torsemide (DEMADEX) 20 MG tablet Take 20 mg by mouth every other day Yes Historical Provider, MD   diclofenac (VOLTAREN) 0.1 % ophthalmic solution  Yes Historical Provider, MD   ofloxacin (OCUFLOX) 0.3 % solution  Yes Historical Provider, MD   prednisoLONE acetate (PRED FORTE) 1 % ophthalmic suspension  Yes Historical Provider, MD   cholestyramine (QUESTRAN) 4 g packet USE ONE PACKET IN WATER 2 TIMES DAILY Yes Coliln Kirby MD   losartan (COZAAR) 100 MG tablet TAKE 1 TABLET BY MOUTH EVERY DAY Yes Collin Kirby MD   omeprazole (Clevester Costain) 40 MG delayed release capsule  Yes Historical Provider, MD        Allergies  Allergies   Allergen Reactions    Ciprofloxacin      G.I. Upset       Social History  Social History     Tobacco Use    Smoking status: Never Smoker    Smokeless tobacco: Never Used   Substance Use Topics    Alcohol use: No   ,   Social History     Substance and Sexual Activity   Drug Use No       Physical Exam  Vitals:    11/22/21 1346   BP: 134/70   Site: Left Upper Arm   Position: Sitting   Cuff Size: Large Adult   Pulse: 59   Temp: 97.2 °F (36.2 °C)   TempSrc: Temporal   SpO2: 98%   Weight: 253 lb 9.6 oz (115 kg)   Height: 5' 7\" (1.702 m)     Estimated body mass index is 39.72 kg/m² as calculated from the following:    Height as of this encounter: 5' 7\" (1.702 m). Weight as of this encounter: 253 lb 9.6 oz (115 kg). Physical Exam  Vitals reviewed. Constitutional:       General: She is not in acute distress. Appearance: Normal appearance. She is obese. HENT:      Head: Normocephalic and atraumatic. Mouth/Throat:      Pharynx: Oropharynx is clear.    Eyes: Conjunctiva/sclera: Conjunctivae normal.      Pupils: Pupils are equal, round, and reactive to light. Cardiovascular:      Rate and Rhythm: Normal rate and regular rhythm. Pulses: Normal pulses. Heart sounds: Normal heart sounds. Pulmonary:      Effort: Pulmonary effort is normal. No respiratory distress. Breath sounds: Normal breath sounds. No wheezing or rales. Abdominal:      Palpations: Abdomen is soft. Tenderness: There is no abdominal tenderness. There is no rebound. Musculoskeletal:         General: No signs of injury. Normal range of motion. Cervical back: Normal range of motion and neck supple. Right lower leg: Edema present. Left lower leg: Edema present. Skin:     General: Skin is warm and dry. Coloration: Skin is not jaundiced. Neurological:      General: No focal deficit present. Mental Status: She is alert and oriented to person, place, and time. Psychiatric:         Behavior: Behavior normal.         Thought Content: Thought content normal.         Judgment: Judgment normal.         Labs and Studies  Lab Results   Component Value Date     02/04/2021    K 3.5 02/04/2021    CL 97 (L) 02/04/2021    CO2 25 02/04/2021    BUN 14 02/04/2021    CREATININE 0.6 02/04/2021    GLUCOSE 88 02/04/2021    CALCIUM 8.8 02/04/2021    LABALBU 3.9 02/04/2021     Lab Results   Component Value Date    WBC 7.7 02/04/2021    HGB 14.1 02/04/2021    HCT 43.0 02/04/2021    MCV 85.9 02/04/2021     02/04/2021     No components found for: HGBA1C      Assessment and Plan   1. Pre-op evaluation    Medically maximized for procedure    - CBC; Future  - COMPREHENSIVE METABOLIC PANEL; Future    2. Preventative health care  - CBC; Future  - COMPREHENSIVE METABOLIC PANEL; Future  - LIPID PANEL; Future  - TSH with Reflex; Future  - HEMOGLOBIN A1C; Future     Here for preoperative consultation. Patient's revised cardiac risk stratification is 0 points. ASA class I.  No current complaints to suggest active cardiac heart disease. No indication for further cardiac work up prior to surgery at this time. - Low risk for NARAYAN. - EKG not required. - Labs reviewed. -Pt was instructed to not take ASA 10 days prior to surgery, and not take pain medication from the NSAIDs family 7 days prior to surgery. Orders Placed This Encounter   Procedures    CBC    COMPREHENSIVE METABOLIC PANEL    LIPID PANEL    TSH with Reflex    HEMOGLOBIN A1C           ASA CLASS:  ASA I A normal healthy patient Healthy, non-smoking, no or minimal alcohol use   ASA II A patient with mild systemic disease Mild diseases only without substantive functional limitations. Examples include (but not limited to): current smoker, social alcohol drinker, pregnancy, obesity (30 < BMI < 40), well-controlled DM/HTN, mild lung disease   ASA III A patient with severe systemic disease Substantive functional limitations; One or more moderate to severe diseases. Examples include (but not limited to): poorly controlled DM or HTN, COPD, morbid obesity (BMI ?40), active hepatitis, alcohol dependence or abuse, implanted pacemaker, moderate reduction of ejection fraction, ESRD undergoing regularly scheduled dialysis, premature infant PCA < 60 weeks, history (>3 months) of MI, CVA, TIA, or CAD/stents.    ASA IV A patient with severe systemic disease that is a constant threat to life Examples include (but not limited to): recent ( < 3 months) MI, CVA, TIA, or CAD/stents, ongoing cardiac ischemia or severe valve dysfunction, severe reduction of ejection fraction, sepsis, DIC, ANJUM or ESRD not undergoing regularly scheduled dialysis   ASA V A moribund patient who is not expected to survive without the operation Examples include (but not limited to): ruptured abdominal/thoracic aneurysm, massive trauma, intracranial bleed with mass effect, ischemic bowel in the face of significant cardiac pathology or multiple organ/system dysfunction   ASA VI A declared brain-dead patient whose organs are being removed for donor purposes       RCRI Score Risk of major cardiac event*   0- class I 3.9%   1- class II 6%   2- class III 10.1%   ? 3- class IV 15%   *Defined as myocardial infarction, pulmonary edema, ventricular fibrillation/primary cardiac arrest, or complete heart block. Inez Adams MD     This dictation was generated by voice recognition computer software. Although all attempts are made to edit the dictation for accuracy, there may be errors in the transcription that are not intended.

## 2021-11-23 LAB
ESTIMATED AVERAGE GLUCOSE: 108.3 MG/DL
HBA1C MFR BLD: 5.4 %

## 2021-12-27 RX ORDER — TORSEMIDE 20 MG/1
TABLET ORAL
Qty: 180 TABLET | Refills: 0 | Status: SHIPPED | OUTPATIENT
Start: 2021-12-27 | End: 2022-04-21

## 2022-03-08 ENCOUNTER — OFFICE VISIT (OUTPATIENT)
Dept: INTERNAL MEDICINE CLINIC | Age: 72
End: 2022-03-08
Payer: COMMERCIAL

## 2022-03-08 VITALS
HEIGHT: 67 IN | DIASTOLIC BLOOD PRESSURE: 87 MMHG | HEART RATE: 78 BPM | SYSTOLIC BLOOD PRESSURE: 137 MMHG | BODY MASS INDEX: 41.44 KG/M2 | WEIGHT: 264 LBS | OXYGEN SATURATION: 99 %

## 2022-03-08 DIAGNOSIS — M23.306 DEGENERATION OF MENISCUS OF RIGHT KNEE: ICD-10-CM

## 2022-03-08 DIAGNOSIS — Z00.00 PREVENTATIVE HEALTH CARE: Primary | ICD-10-CM

## 2022-03-08 DIAGNOSIS — I10 ESSENTIAL HYPERTENSION: ICD-10-CM

## 2022-03-08 PROCEDURE — 99213 OFFICE O/P EST LOW 20 MIN: CPT | Performed by: HOSPITALIST

## 2022-03-08 ASSESSMENT — ENCOUNTER SYMPTOMS
VOICE CHANGE: 0
SHORTNESS OF BREATH: 0
NAUSEA: 0
DIARRHEA: 0
VOMITING: 0
CHEST TIGHTNESS: 0
CONSTIPATION: 0
BACK PAIN: 0
ABDOMINAL DISTENTION: 0
TROUBLE SWALLOWING: 0
ABDOMINAL PAIN: 0
SINUS PAIN: 0
SORE THROAT: 0
SINUS PRESSURE: 0
BLOOD IN STOOL: 0
COUGH: 0
WHEEZING: 0

## 2022-03-08 NOTE — PROGRESS NOTES
[facility]  Follow-up care visit   3/8/2022    Patient:  Dmitriy Jerez                                               : 1950  Age: 70 y.o. MRN: [id number]  Date : 3/8/2022    Dmitriy Jerez is a 70 y.o. female who presents for :   R knee arthritis/meniscal degeneration:  She has seen Elvis Montoya in early  for steroid injections. She says he is now at [facility] and wants referal to [facility]. Potentially wants surgery. Chief Complaint   Patient presents with    Annual Exam     c/o R knee pain. Review of Systems   Constitutional: Negative for activity change, appetite change, chills, diaphoresis, fatigue, fever and unexpected weight change. HENT: Negative for sinus pressure, sinus pain, sore throat, trouble swallowing and voice change. Eyes: Negative for visual disturbance. Respiratory: Negative for cough, chest tightness, shortness of breath and wheezing. Cardiovascular: Negative for chest pain, palpitations and leg swelling. Gastrointestinal: Negative for abdominal distention, abdominal pain, blood in stool, constipation, diarrhea, nausea and vomiting. Endocrine: Negative for polydipsia and polyphagia. Genitourinary: Negative for decreased urine volume, difficulty urinating, dysuria and urgency. Musculoskeletal: Positive for arthralgias (R knee pain. Wants surgery. ). Negative for back pain, gait problem, joint swelling and myalgias. Neurological: Negative for dizziness, seizures, syncope, light-headedness and headaches. Psychiatric/Behavioral: Negative for agitation, behavioral problems, confusion and suicidal ideas.          Past Medical History:   Diagnosis Date    Acquired lymphedema of lower extremity     Esophageal reflux     Flushing     HTN (hypertension)     Idiopathic chronic venous hypertension of left lower extremity with inflammation     Labyrinthitis, unspecified     Lumbar disc disease     traumatic fall    Migraine     Pain in limb     left leg    Screening mammogram 02/29/2008    (Both)Negative       Past Surgical History:   Procedure Laterality Date    APPENDECTOMY      CHOLECYSTECTOMY      COLONOSCOPY  11/2014    negative - Dr Sherley Lopez ENDOSCOPY  09/2019    Small sliding hiatal hernia       Current Outpatient Medications   Medication Sig Dispense Refill    torsemide (DEMADEX) 20 MG tablet TAKE 40MG (2 TABS) DAILY 180 tablet 0    diclofenac (VOLTAREN) 0.1 % ophthalmic solution       ofloxacin (OCUFLOX) 0.3 % solution       prednisoLONE acetate (PRED FORTE) 1 % ophthalmic suspension       cholestyramine (QUESTRAN) 4 g packet USE ONE PACKET IN WATER 2 TIMES DAILY 180 packet 1    losartan (COZAAR) 100 MG tablet TAKE 1 TABLET BY MOUTH EVERY DAY 90 tablet 3    omeprazole (PRILOSEC) 40 MG delayed release capsule        No current facility-administered medications for this visit. /87   Pulse 78   Ht 5' 7\" (1.702 m)   Wt 264 lb (119.7 kg)   SpO2 99%   BMI 41.35 kg/m²     Physical Exam   Physical Exam  Vitals reviewed. Constitutional:       General: She is not in acute distress. Appearance: Normal appearance. HENT:      Head: Normocephalic and atraumatic. Mouth/Throat:      Pharynx: Oropharynx is clear. Eyes:      Conjunctiva/sclera: Conjunctivae normal.      Pupils: Pupils are equal, round, and reactive to light. Cardiovascular:      Rate and Rhythm: Normal rate and regular rhythm. Pulses: Normal pulses. Heart sounds: Normal heart sounds. Pulmonary:      Effort: Pulmonary effort is normal. No respiratory distress. Breath sounds: Normal breath sounds. No wheezing or rales. Abdominal:      Palpations: Abdomen is soft. Tenderness: There is no abdominal tenderness. There is no rebound. Musculoskeletal:         General: No signs of injury. Normal range of motion. Cervical back: Normal range of motion and neck supple.

## 2022-03-21 ENCOUNTER — OFFICE VISIT (OUTPATIENT)
Dept: ORTHOPEDIC SURGERY | Age: 72
End: 2022-03-21
Payer: COMMERCIAL

## 2022-03-21 VITALS — BODY MASS INDEX: 41.44 KG/M2 | WEIGHT: 264 LBS | HEIGHT: 67 IN

## 2022-03-21 DIAGNOSIS — M17.11 PRIMARY OSTEOARTHRITIS OF RIGHT KNEE: ICD-10-CM

## 2022-03-21 DIAGNOSIS — M25.561 RIGHT KNEE PAIN, UNSPECIFIED CHRONICITY: Primary | ICD-10-CM

## 2022-03-21 PROCEDURE — 20610 DRAIN/INJ JOINT/BURSA W/O US: CPT | Performed by: ORTHOPAEDIC SURGERY

## 2022-03-21 PROCEDURE — 99204 OFFICE O/P NEW MOD 45 MIN: CPT | Performed by: ORTHOPAEDIC SURGERY

## 2022-03-21 RX ORDER — BUPIVACAINE HYDROCHLORIDE 2.5 MG/ML
10 INJECTION, SOLUTION INFILTRATION; PERINEURAL ONCE
Status: COMPLETED | OUTPATIENT
Start: 2022-03-21 | End: 2022-03-21

## 2022-03-21 RX ORDER — TRIAMCINOLONE ACETONIDE 40 MG/ML
80 INJECTION, SUSPENSION INTRA-ARTICULAR; INTRAMUSCULAR ONCE
Status: COMPLETED | OUTPATIENT
Start: 2022-03-21 | End: 2022-03-21

## 2022-03-21 RX ORDER — LIDOCAINE HYDROCHLORIDE 10 MG/ML
40 INJECTION, SOLUTION INFILTRATION; PERINEURAL ONCE
Status: COMPLETED | OUTPATIENT
Start: 2022-03-21 | End: 2022-03-21

## 2022-03-21 RX ADMIN — LIDOCAINE HYDROCHLORIDE 40 MG: 10 INJECTION, SOLUTION INFILTRATION; PERINEURAL at 09:04

## 2022-03-21 RX ADMIN — BUPIVACAINE HYDROCHLORIDE 10 MG: 2.5 INJECTION, SOLUTION INFILTRATION; PERINEURAL at 09:03

## 2022-03-21 RX ADMIN — TRIAMCINOLONE ACETONIDE 80 MG: 40 INJECTION, SUSPENSION INTRA-ARTICULAR; INTRAMUSCULAR at 09:03

## 2022-03-21 NOTE — PROGRESS NOTES
on File Prior to Visit   Medication Sig Dispense Refill    torsemide (DEMADEX) 20 MG tablet TAKE 40MG (2 TABS) DAILY 180 tablet 0    diclofenac (VOLTAREN) 0.1 % ophthalmic solution       ofloxacin (OCUFLOX) 0.3 % solution       prednisoLONE acetate (PRED FORTE) 1 % ophthalmic suspension       cholestyramine (QUESTRAN) 4 g packet USE ONE PACKET IN WATER 2 TIMES DAILY 180 packet 1    losartan (COZAAR) 100 MG tablet TAKE 1 TABLET BY MOUTH EVERY DAY 90 tablet 3    omeprazole (PRILOSEC) 40 MG delayed release capsule        No current facility-administered medications on file prior to visit. ASCVD 10-YEAR RISK SCORE  The 10-year ASCVD risk score (Cynthia Regan, et al., 2013) is: 15.2%    Values used to calculate the score:      Age: 70 years      Sex: Female      Is Non- : No      Diabetic: No      Tobacco smoker: No      Systolic Blood Pressure: 392 mmHg      Is BP treated: Yes      HDL Cholesterol: 53 mg/dL      Total Cholesterol: 165 mg/dL     Review of Systems  10-point ROS is negative other than HPI. Physical Exam  Based off 1997 Exam Criteria  Ht 5' 7\" (1.702 m)   Wt 264 lb (119.7 kg)   BMI 41.35 kg/m²      Constitutional:       General: He is not in acute distress. Morbidly obese. Appearance: Normal appearance. Cardiovascular:      Rate and Rhythm: Normal rate and regular rhythm. Pulses: Normal pulses. Pulmonary:      Effort: Pulmonary effort is normal. No respiratory distress. Neurological:      Mental Status: He is alert and oriented to person, place, and time. Mental status is at baseline. Musculoskeletal:  Gait:  antalgic  Alvin Hip: Pain-free range of motion of bilateral hips, negative impingement sign    R Knee: Point tenderness lateral joint line and medial joint line. Right knee has constitutional valgus alignment. She is wearing compression socks today. No skin findings. Mild global swelling present in the leg  Consistent with known lymphedema. Range of motion limited 10 to 80 degrees  L Knee:  Point tenderness lateral joint line and medial joint line. Right knee has constitutional valgus alignment. She is wearing compression socks today. No skin findings. Mild global swelling present in the leg  Consistent with known lymphedema. Range of motion limited 10 to 80 degrees. Imaging  Right Knee: [address]  Radiographs: Valgus gonarthrosis, tricompartmental arthritis with joint space narrowing and osteophytes visible. Left knee has varus gonarthrosis      Procedure:  Orders Placed This Encounter   Procedures    XR KNEE RIGHT (MIN 4 VIEWS)     Standing Status:   Future     Number of Occurrences:   1     Standing Expiration Date:   3/18/2023     Order Specific Question:   Reason for exam:     Answer:   PAIN    WA ARTHROCENTESIS ASPIR&/INJ MAJOR JT/BURSA W/O US       Assessment and Plan  Noel Handy was seen today for knee pain. Diagnoses and all orders for this visit:    Right knee pain, unspecified chronicity  -     XR KNEE RIGHT (MIN 4 VIEWS); Future  -     WA ARTHROCENTESIS ASPIR&/INJ MAJOR JT/BURSA W/O US    Primary osteoarthritis of right knee  -     WA ARTHROCENTESIS ASPIR&/INJ MAJOR JT/BURSA W/O US    Other orders  -     bupivacaine (MARCAINE) 0.25 % injection 10 mg  -     lidocaine 1 % injection 40 mg  -     triamcinolone acetonide (KENALOG-40) injection 80 mg        I discussed with Shirley Emery that her history, symptoms, signs and imaging are most consistent with knee arthritis, morbid obesity. We discussed weight loss as potential treatment for arthritis as well as perioperative implications of weight. We reviewed the natural history of these conditions and treatment options ranging from conservative measures (rest, icing, activity modification, physical therapy, pain meds, cortisone injection) to surgical options.      In terms of treatment, I recommended continuing with rest, icing, avoidance of painful activities, NSAIDs or pain meds as tolerated, and physical therapy. Right Knee Cortisone Injection CPT 82058   Consent was obtained after discussion of the risks, benefits, alternatives, including, but not limited to bleeding, pain, infection, skin disruption or discoloration. Laterality was confirmed (timeout). The knee was prepped with alcohol.  A formulation of 2cc of 40mg/ml Kenalog, 4cc of 1% lidocaine, 4cc of 0.25% sensoricaine was injected into the knee joint space with a 25 gauge needle without difficulty. The site was cleaned and dressed with a band aid.  She tolerated this well and there were no complications. We discussed surgical options as well, should conservative measures fail. Electronically signed by Bjorn Espino MD on 3/21/2022 at 10:58 AM  This dictation was generated by voice recognition computer software. Although all attempts are made to edit the dictation for accuracy, there may be errors in the transcription that are not intended.

## 2022-04-21 RX ORDER — TORSEMIDE 20 MG/1
TABLET ORAL
Qty: 180 TABLET | Refills: 0 | Status: SHIPPED | OUTPATIENT
Start: 2022-04-21 | End: 2022-05-28

## 2022-04-25 RX ORDER — CHOLESTYRAMINE 4 G/9G
POWDER, FOR SUSPENSION ORAL
Qty: 180 PACKET | Refills: 1 | Status: SHIPPED | OUTPATIENT
Start: 2022-04-25 | End: 2022-11-02

## 2022-05-18 ENCOUNTER — OFFICE VISIT (OUTPATIENT)
Dept: INTERNAL MEDICINE CLINIC | Age: 72
End: 2022-05-18
Payer: COMMERCIAL

## 2022-05-18 VITALS
DIASTOLIC BLOOD PRESSURE: 82 MMHG | HEIGHT: 66 IN | BODY MASS INDEX: 42.75 KG/M2 | WEIGHT: 266 LBS | HEART RATE: 73 BPM | SYSTOLIC BLOOD PRESSURE: 142 MMHG | OXYGEN SATURATION: 98 %

## 2022-05-18 DIAGNOSIS — I10 ESSENTIAL HYPERTENSION: ICD-10-CM

## 2022-05-18 DIAGNOSIS — R42 DIZZINESS: ICD-10-CM

## 2022-05-18 DIAGNOSIS — R42 VERTIGO: Primary | ICD-10-CM

## 2022-05-18 LAB
A/G RATIO: 2 (ref 1.1–2.2)
ALBUMIN SERPL-MCNC: 4.6 G/DL (ref 3.4–5)
ALP BLD-CCNC: 114 U/L (ref 40–129)
ALT SERPL-CCNC: 19 U/L (ref 10–40)
ANION GAP SERPL CALCULATED.3IONS-SCNC: 11 MMOL/L (ref 3–16)
AST SERPL-CCNC: 16 U/L (ref 15–37)
BASOPHILS ABSOLUTE: 0 K/UL (ref 0–0.2)
BASOPHILS RELATIVE PERCENT: 0.4 %
BILIRUB SERPL-MCNC: 0.4 MG/DL (ref 0–1)
BUN BLDV-MCNC: 9 MG/DL (ref 7–20)
CALCIUM SERPL-MCNC: 9.2 MG/DL (ref 8.3–10.6)
CHLORIDE BLD-SCNC: 109 MMOL/L (ref 99–110)
CO2: 25 MMOL/L (ref 21–32)
CREAT SERPL-MCNC: 0.5 MG/DL (ref 0.6–1.2)
EOSINOPHILS ABSOLUTE: 0 K/UL (ref 0–0.6)
EOSINOPHILS RELATIVE PERCENT: 0.6 %
GFR AFRICAN AMERICAN: >60
GFR NON-AFRICAN AMERICAN: >60
GLUCOSE BLD-MCNC: 93 MG/DL (ref 70–99)
HCT VFR BLD CALC: 41.8 % (ref 36–48)
HEMOGLOBIN: 13.7 G/DL (ref 12–16)
LYMPHOCYTES ABSOLUTE: 1.5 K/UL (ref 1–5.1)
LYMPHOCYTES RELATIVE PERCENT: 24.2 %
MCH RBC QN AUTO: 28.2 PG (ref 26–34)
MCHC RBC AUTO-ENTMCNC: 32.8 G/DL (ref 31–36)
MCV RBC AUTO: 86.2 FL (ref 80–100)
MONOCYTES ABSOLUTE: 0.3 K/UL (ref 0–1.3)
MONOCYTES RELATIVE PERCENT: 5.4 %
NEUTROPHILS ABSOLUTE: 4.2 K/UL (ref 1.7–7.7)
NEUTROPHILS RELATIVE PERCENT: 69.4 %
PDW BLD-RTO: 15.4 % (ref 12.4–15.4)
PLATELET # BLD: 241 K/UL (ref 135–450)
PMV BLD AUTO: 7.6 FL (ref 5–10.5)
POTASSIUM SERPL-SCNC: 4.1 MMOL/L (ref 3.5–5.1)
RBC # BLD: 4.85 M/UL (ref 4–5.2)
SODIUM BLD-SCNC: 145 MMOL/L (ref 136–145)
TOTAL PROTEIN: 6.9 G/DL (ref 6.4–8.2)
WBC # BLD: 6.1 K/UL (ref 4–11)

## 2022-05-18 PROCEDURE — 99213 OFFICE O/P EST LOW 20 MIN: CPT | Performed by: INTERNAL MEDICINE

## 2022-05-18 RX ORDER — MECLIZINE HCL 12.5 MG/1
12.5 TABLET ORAL 3 TIMES DAILY PRN
Qty: 30 TABLET | Refills: 0 | Status: SHIPPED | OUTPATIENT
Start: 2022-05-18 | End: 2022-05-28

## 2022-05-18 ASSESSMENT — PATIENT HEALTH QUESTIONNAIRE - PHQ9
1. LITTLE INTEREST OR PLEASURE IN DOING THINGS: 0
SUM OF ALL RESPONSES TO PHQ QUESTIONS 1-9: 0
SUM OF ALL RESPONSES TO PHQ QUESTIONS 1-9: 0
SUM OF ALL RESPONSES TO PHQ9 QUESTIONS 1 & 2: 0
SUM OF ALL RESPONSES TO PHQ QUESTIONS 1-9: 0
2. FEELING DOWN, DEPRESSED OR HOPELESS: 0
SUM OF ALL RESPONSES TO PHQ QUESTIONS 1-9: 0

## 2022-05-18 ASSESSMENT — ENCOUNTER SYMPTOMS
EYE REDNESS: 0
CHEST TIGHTNESS: 0
COUGH: 0
SORE THROAT: 1
BACK PAIN: 0
SWOLLEN GLANDS: 0
VISUAL CHANGE: 0
SHORTNESS OF BREATH: 0
VOMITING: 0
NAUSEA: 0
CHANGE IN BOWEL HABIT: 0
ABDOMINAL PAIN: 0

## 2022-05-18 NOTE — PROGRESS NOTES
Subjective:      Patient ID: Katheryn Solorzano is a 70 y.o. female    Chief Complaint   Patient presents with    Pressure Behind the Eyes     X 5 days     Dizziness       Dizziness  This is a recurrent problem. Episode onset: 5 days  The problem occurs intermittently. The problem has been unchanged. Associated symptoms include congestion, fatigue and a sore throat. Pertinent negatives include no abdominal pain, arthralgias, change in bowel habit, chest pain, chills, coughing, diaphoresis, fever, headaches, nausea, neck pain, rash, swollen glands, urinary symptoms, vertigo, visual change, vomiting or weakness. Associated symptoms comments: Swollen head feeling . The symptoms are aggravated by bending. She has tried rest for the symptoms. The treatment provided no relief. Current Outpatient Medications on File Prior to Visit   Medication Sig Dispense Refill    cholestyramine (QUESTRAN) 4 g packet USE ONE PACKET IN WATER 2 TIMES DAILY 180 packet 1    torsemide (DEMADEX) 20 MG tablet TAKE 1 TABLET BY MOUTH TWICE A DAY EVERY  tablet 0    losartan (COZAAR) 100 MG tablet TAKE 1 TABLET BY MOUTH EVERY DAY 90 tablet 3    omeprazole (PRILOSEC) 40 MG delayed release capsule        No current facility-administered medications on file prior to visit. Allergies   Allergen Reactions    Ciprofloxacin      G.I. Upset       Review of Systems   Constitutional: Positive for fatigue. Negative for chills, diaphoresis, fever and unexpected weight change. HENT: Positive for congestion and sore throat. Negative for hearing loss. Eyes: Negative for redness and visual disturbance. Respiratory: Negative for cough, chest tightness and shortness of breath. Cardiovascular: Negative for chest pain and leg swelling. Gastrointestinal: Negative for abdominal pain, change in bowel habit, nausea and vomiting. Endocrine: Negative for cold intolerance, heat intolerance, polydipsia and polyuria. Genitourinary: Negative for dysuria and frequency. Musculoskeletal: Negative for arthralgias, back pain and neck pain. Skin: Negative for rash and wound. Neurological: Positive for dizziness. Negative for vertigo, weakness and headaches. Hematological: Negative for adenopathy. Does not bruise/bleed easily. Psychiatric/Behavioral: Negative for sleep disturbance. The patient is not nervous/anxious. Objective:   Physical Exam  Constitutional:       Appearance: Normal appearance. HENT:      Right Ear: Tympanic membrane normal.      Left Ear: Tympanic membrane normal.      Mouth/Throat:      Pharynx: No oropharyngeal exudate or posterior oropharyngeal erythema. Eyes:      Extraocular Movements: Extraocular movements intact. Cardiovascular:      Rate and Rhythm: Normal rate and regular rhythm. Heart sounds: Normal heart sounds. Pulmonary:      Effort: Pulmonary effort is normal.      Breath sounds: Normal breath sounds. Musculoskeletal:      Right lower leg: No edema. Skin:     General: Skin is warm and dry. Neurological:      Mental Status: She is alert and oriented to person, place, and time. Psychiatric:         Mood and Affect: Mood normal.         Assessment and plan       1. Vertigo  Patient is not having true vertigo but more dizziness. She has had some dizziness in the past and meclizine was helpful.  - meclizine (ANTIVERT) 12.5 MG tablet; Take 1 tablet by mouth 3 times daily as needed for Dizziness  Dispense: 30 tablet; Refill: 0    2. Dizziness  Dizziness over the last 5 days. Her COVID test this morning was negative. I told her she should probably repeat her COVID test in 2 to 3 days. There is no fever, headache, drainage, or body aches. She is on 2 medications which could cause dizziness. She could have sleep apnea with morning head fullness and dizziness. I asked her to monitor the situation and see a sleep physician if she becomes more concerned.

## 2022-05-28 ENCOUNTER — APPOINTMENT (OUTPATIENT)
Dept: GENERAL RADIOLOGY | Age: 72
End: 2022-05-28
Payer: COMMERCIAL

## 2022-05-28 ENCOUNTER — APPOINTMENT (OUTPATIENT)
Dept: CT IMAGING | Age: 72
End: 2022-05-28
Payer: COMMERCIAL

## 2022-05-28 ENCOUNTER — HOSPITAL ENCOUNTER (EMERGENCY)
Age: 72
Discharge: HOME | End: 2022-05-28
Payer: COMMERCIAL

## 2022-05-28 VITALS
BODY MASS INDEX: 42.11 KG/M2 | RESPIRATION RATE: 18 BRPM | DIASTOLIC BLOOD PRESSURE: 78 MMHG | WEIGHT: 268.3 LBS | TEMPERATURE: 97.9 F | OXYGEN SATURATION: 98 % | SYSTOLIC BLOOD PRESSURE: 177 MMHG | HEIGHT: 67 IN | HEART RATE: 77 BPM

## 2022-05-28 DIAGNOSIS — N39.0 URINARY TRACT INFECTION WITHOUT HEMATURIA, SITE UNSPECIFIED: Primary | ICD-10-CM

## 2022-05-28 LAB
A/G RATIO: 2 (ref 1.1–2.2)
ALBUMIN SERPL-MCNC: 4.5 G/DL (ref 3.4–5)
ALP BLD-CCNC: 116 U/L (ref 40–129)
ALT SERPL-CCNC: 12 U/L (ref 10–40)
ANION GAP SERPL CALCULATED.3IONS-SCNC: 13 MMOL/L (ref 3–16)
AST SERPL-CCNC: 11 U/L (ref 15–37)
BACTERIA: ABNORMAL /HPF
BASOPHILS ABSOLUTE: 0 K/UL (ref 0–0.2)
BASOPHILS RELATIVE PERCENT: 0.2 %
BILIRUB SERPL-MCNC: 0.3 MG/DL (ref 0–1)
BILIRUBIN URINE: NEGATIVE
BLOOD, URINE: NEGATIVE
BUN BLDV-MCNC: 9 MG/DL (ref 7–20)
CALCIUM SERPL-MCNC: 9 MG/DL (ref 8.3–10.6)
CHLORIDE BLD-SCNC: 110 MMOL/L (ref 99–110)
CLARITY: ABNORMAL
CO2: 23 MMOL/L (ref 21–32)
COLOR: YELLOW
CREAT SERPL-MCNC: 0.5 MG/DL (ref 0.6–1.2)
EOSINOPHILS ABSOLUTE: 0 K/UL (ref 0–0.6)
EOSINOPHILS RELATIVE PERCENT: 0.7 %
EPITHELIAL CELLS, UA: 1 /HPF (ref 0–5)
GFR AFRICAN AMERICAN: >60
GFR NON-AFRICAN AMERICAN: >60
GLUCOSE BLD-MCNC: 96 MG/DL (ref 70–99)
GLUCOSE URINE: NEGATIVE MG/DL
HCT VFR BLD CALC: 41.1 % (ref 36–48)
HEMOGLOBIN: 13.4 G/DL (ref 12–16)
HYALINE CASTS: 0 /LPF (ref 0–8)
KETONES, URINE: NEGATIVE MG/DL
LEUKOCYTE ESTERASE, URINE: ABNORMAL
LYMPHOCYTES ABSOLUTE: 1.3 K/UL (ref 1–5.1)
LYMPHOCYTES RELATIVE PERCENT: 21.7 %
MCH RBC QN AUTO: 28.2 PG (ref 26–34)
MCHC RBC AUTO-ENTMCNC: 32.7 G/DL (ref 31–36)
MCV RBC AUTO: 86.2 FL (ref 80–100)
MICROSCOPIC EXAMINATION: YES
MONOCYTES ABSOLUTE: 0.4 K/UL (ref 0–1.3)
MONOCYTES RELATIVE PERCENT: 7.1 %
NEUTROPHILS ABSOLUTE: 4.1 K/UL (ref 1.7–7.7)
NEUTROPHILS RELATIVE PERCENT: 70.3 %
NITRITE, URINE: NEGATIVE
PDW BLD-RTO: 15.5 % (ref 12.4–15.4)
PH UA: 5 (ref 5–8)
PLATELET # BLD: 240 K/UL (ref 135–450)
PMV BLD AUTO: 7.6 FL (ref 5–10.5)
POTASSIUM REFLEX MAGNESIUM: 4.1 MMOL/L (ref 3.5–5.1)
PROTEIN UA: NEGATIVE MG/DL
RBC # BLD: 4.77 M/UL (ref 4–5.2)
RBC UA: 1 /HPF (ref 0–4)
SODIUM BLD-SCNC: 146 MMOL/L (ref 136–145)
SPECIFIC GRAVITY UA: 1.01 (ref 1–1.03)
TOTAL PROTEIN: 6.8 G/DL (ref 6.4–8.2)
TROPONIN: <0.01 NG/ML
TSH REFLEX: 0.78 UIU/ML (ref 0.27–4.2)
URINE REFLEX TO CULTURE: YES
URINE TYPE: ABNORMAL
UROBILINOGEN, URINE: 0.2 E.U./DL
WBC # BLD: 5.9 K/UL (ref 4–11)
WBC UA: 15 /HPF (ref 0–5)

## 2022-05-28 PROCEDURE — 84443 ASSAY THYROID STIM HORMONE: CPT

## 2022-05-28 PROCEDURE — 80053 COMPREHEN METABOLIC PANEL: CPT

## 2022-05-28 PROCEDURE — 81001 URINALYSIS AUTO W/SCOPE: CPT

## 2022-05-28 PROCEDURE — 85025 COMPLETE CBC W/AUTO DIFF WBC: CPT

## 2022-05-28 PROCEDURE — 93005 ELECTROCARDIOGRAM TRACING: CPT | Performed by: PHYSICIAN ASSISTANT

## 2022-05-28 PROCEDURE — 71046 X-RAY EXAM CHEST 2 VIEWS: CPT

## 2022-05-28 PROCEDURE — 70450 CT HEAD/BRAIN W/O DYE: CPT

## 2022-05-28 PROCEDURE — 6370000000 HC RX 637 (ALT 250 FOR IP): Performed by: PHYSICIAN ASSISTANT

## 2022-05-28 PROCEDURE — 83036 HEMOGLOBIN GLYCOSYLATED A1C: CPT

## 2022-05-28 PROCEDURE — 99285 EMERGENCY DEPT VISIT HI MDM: CPT

## 2022-05-28 PROCEDURE — 87086 URINE CULTURE/COLONY COUNT: CPT

## 2022-05-28 PROCEDURE — 84484 ASSAY OF TROPONIN QUANT: CPT

## 2022-05-28 RX ORDER — 0.9 % SODIUM CHLORIDE 0.9 %
500 INTRAVENOUS SOLUTION INTRAVENOUS ONCE
Status: DISCONTINUED | OUTPATIENT
Start: 2022-05-28 | End: 2022-05-28

## 2022-05-28 RX ORDER — CEPHALEXIN 500 MG/1
500 CAPSULE ORAL ONCE
Status: COMPLETED | OUTPATIENT
Start: 2022-05-28 | End: 2022-05-28

## 2022-05-28 RX ORDER — CEPHALEXIN 500 MG/1
500 CAPSULE ORAL 2 TIMES DAILY
Qty: 10 CAPSULE | Refills: 0 | Status: SHIPPED | OUTPATIENT
Start: 2022-05-28 | End: 2022-06-02

## 2022-05-28 RX ADMIN — CEPHALEXIN 500 MG: 500 CAPSULE ORAL at 19:00

## 2022-05-28 NOTE — ED NOTES
Introduced self to pt, pt updated on POC,  All questions and needs addressed at this time.      Iveth Garvin RN  05/28/22 [id number]

## 2022-05-28 NOTE — ED TRIAGE NOTES
Lightheadedness,  Intermittent hot flashes and chills for over a week. Slight headache. Denies chest pain, nausea, SOB.

## 2022-05-28 NOTE — ED PROVIDER NOTES
[facility] [facility]        Pt Name: Pratima Machado  MRN: [id number]  [place] 1950  Date of evaluation: 5/28/2022  Provider: ELMER Hay  PCP: Sakshi Cartwright MD  Note Started: 6:38 PM EDT       VANCE. I have evaluated this patient. My supervising physician was available for consultation. CHIEF COMPLAINT       Chief Complaint   Patient presents with    Dizziness     Lightheadedness, Hot Flashes and Chills for over a week. HISTORY OF PRESENT ILLNESS   (Location, Timing/Onset, Context/Setting, Quality, Duration, Modifying Factors, Severity, Associated Signs and Symptoms)  Note limiting factors. Chief Complaint: Light headed, chills x 2 weeks     Pratima Machado is a 70 y.o. female who presents to the ED today with complaints of a 2 week history of feeling light headed with chills. Reports that she saw her primary care doctor about a week and a half ago, but that they did not really find anything wrong. She states that she has had persistent symptoms, prompting her visit to the emergency department today. Patient reports that the symptoms seem to get worse if she is standing or walking for long periods of times. She also has episodes of hot flashes and chills. She denies any chest pain, abdominal pain, nausea or vomiting. She denies any vision changes. She states she has a long history of ear problems, and has meclizine at home. She tried taking her home dose of meclizine but it did not help to improve her symptoms. She is quite concerned that her A1C has not been checked recently and wonders her symptoms may be related to elevated blood glucose. Patient denies any history of diabetes. She has no further complaints at this time. Nursing Notes were all reviewed and agreed with or any disagreements were addressed in the HPI.     REVIEW OF SYSTEMS    (2-9 systems for level 4, 10 or more for level 5)     Review of Systems   Constitutional: Negative for chills and fever. Eyes: Negative for photophobia and visual disturbance. Respiratory: Negative for cough, chest tightness and shortness of breath. Cardiovascular: Negative for chest pain and palpitations. Gastrointestinal: Negative for abdominal pain, nausea and vomiting. Genitourinary: Positive for frequency. Negative for urgency. Musculoskeletal: Negative for arthralgias and gait problem. Neurological: Positive for light-headedness. Negative for tremors, syncope, facial asymmetry, speech difficulty, weakness, numbness and headaches. Positives and Pertinent negatives as per HPI. Except as noted above in the ROS, all other systems were reviewed and negative.        PAST MEDICAL HISTORY     Past Medical History:   Diagnosis Date    Acquired lymphedema of lower extremity     Esophageal reflux     Flushing     HTN (hypertension)     Idiopathic chronic venous hypertension of left lower extremity with inflammation     Labyrinthitis, unspecified     Lumbar disc disease     traumatic fall    Migraine     Pain in limb     left leg    Screening mammogram 02/29/2008    (Both)Negative         SURGICAL HISTORY     Past Surgical History:   Procedure Laterality Date    APPENDECTOMY      CHOLECYSTECTOMY      COLONOSCOPY  11/2014    negative - Dr Beulah Closs Shirley  ENDOSCOPY  09/2019    Small sliding hiatal hernia         CURRENTMEDICATIONS       Discharge Medication List as of 5/28/2022  6:52 PM      CONTINUE these medications which have NOT CHANGED    Details   meclizine (ANTIVERT) 12.5 MG tablet Take 1 tablet by mouth 3 times daily as needed for Dizziness, Disp-30 tablet, R-0Normal      cholestyramine (QUESTRAN) 4 g packet USE ONE PACKET IN WATER 2 TIMES DAILY, Disp-180 packet, R-1Normal      losartan (COZAAR) 100 MG tablet TAKE 1 TABLET BY MOUTH EVERY DAY, Disp-90 tablet, R-3Normal      omeprazole (PRILOSEC) 40 MG delayed release capsule Historical Med               ALLERGIES     Ciprofloxacin    FAMILYHISTORY       Family History   Problem Relation Age of Onset    Cancer Mother         skin, SCC          SOCIAL HISTORY       Social History     Tobacco Use    Smoking status: Never Smoker    Smokeless tobacco: Never Used   Substance Use Topics    Alcohol use: No    Drug use: No       SCREENINGS    [place] Coma Scale  Eye Opening: Spontaneous  Best Verbal Response: Oriented  Best Motor Response: Obeys commands  [place] Coma Scale Score: 15        PHYSICAL EXAM    (up to 7 for level 4, 8 or more for level 5)     ED Triage Vitals [05/28/22 1531]   BP Temp Temp Source Heart Rate Resp SpO2 Height Weight   (!) 177/78 97.9 °F (36.6 °C) Oral 77 -- 98 % 5' 7\" (1.702 m) 268 lb 4.8 oz (121.7 kg)       Physical Exam  Vitals and nursing note reviewed. Constitutional:       General: She is not in acute distress. Appearance: Normal appearance. She is well-developed. She is not ill-appearing, toxic-appearing or diaphoretic. HENT:      Head: Normocephalic and atraumatic. Right Ear: Tympanic membrane, ear canal and external ear normal. There is no impacted cerumen. Left Ear: Tympanic membrane, ear canal and external ear normal. There is no impacted cerumen. Mouth/Throat:      Mouth: Mucous membranes are moist.      Pharynx: Oropharynx is clear. Eyes:      Conjunctiva/sclera: Conjunctivae normal.      Pupils: Pupils are equal, round, and reactive to light. Cardiovascular:      Rate and Rhythm: Normal rate and regular rhythm. Pulses: Normal pulses. Heart sounds: Normal heart sounds. Pulmonary:      Effort: Pulmonary effort is normal. No respiratory distress. Breath sounds: Normal breath sounds. No wheezing or rales. Abdominal:      General: Bowel sounds are normal. There is no distension. Palpations: Abdomen is soft. Tenderness: There is no abdominal tenderness.    Musculoskeletal:      Cervical back: Normal range of motion and neck supple. Right lower leg: Edema (chronic, no acute change per pt) present. Left lower leg: Edema (chronic, no acute change per pt) present. Skin:     General: Skin is warm and dry. Neurological:      General: No focal deficit present. Mental Status: She is alert and oriented to person, place, and time. Cranial Nerves: No cranial nerve deficit. Sensory: No sensory deficit. Motor: No weakness. Coordination: Coordination normal.      Gait: Gait normal.   Psychiatric:         Mood and Affect: Mood normal.         Behavior: Behavior normal. Behavior is cooperative. Thought Content: Thought content normal.         DIAGNOSTIC RESULTS   LABS:    Labs Reviewed   CBC WITH AUTO DIFFERENTIAL - Abnormal; Notable for the following components:       Result Value    RDW 15.5 (*)     All other components within normal limits   COMPREHENSIVE METABOLIC PANEL W/ REFLEX TO MG FOR LOW K - Abnormal; Notable for the following components:    Sodium 146 (*)     CREATININE 0.5 (*)     AST 11 (*)     All other components within normal limits   URINALYSIS WITH REFLEX TO CULTURE - Abnormal; Notable for the following components:    Clarity, UA CLOUDY (*)     Leukocyte Esterase, Urine MODERATE (*)     All other components within normal limits   MICROSCOPIC URINALYSIS - Abnormal; Notable for the following components:    WBC, UA 15 (*)     All other components within normal limits   CULTURE, URINE   TROPONIN   TSH WITH REFLEX   HEMOGLOBIN A1C       When ordered only abnormal lab results are displayed. All other labs were within normal range or not returned as of this dictation. EKG: When ordered, EKG's are interpreted by the Emergency Department Physician in the absence of a cardiologist.  Please see their note for interpretation of EKG.     RADIOLOGY:   Non-plain film images such as CT, Ultrasound and MRI are read by the radiologist. Plain radiographic images are visualized and preliminarily interpreted by the ED Provider with the below findings:    Interpretation per the Radiologist below, if available at the time of this note:    XR CHEST (2 VW)   Final Result   Stable chronic changes with no acute abnormality seen. CT Head WO Contrast   Final Result   No acute intracranial abnormality. XR CHEST (2 VW)    Result Date: 5/28/2022  EXAMINATION: TWO XRAY VIEWS OF THE CHEST 5/28/2022 4:25 pm COMPARISON: 01/12/2016 HISTORY: ORDERING SYSTEM PROVIDED HISTORY: Dizziness, chills TECHNOLOGIST PROVIDED HISTORY: Reason for exam:->Dizziness, chills Reason for Exam: Dizziness, chills FINDINGS: The heart is normal.  The pulmonary vessels are normal.  No consolidation or effusion is seen. The bones are intact. Stable chronic changes with no acute abnormality seen. CT Head WO Contrast    Result Date: 5/28/2022  EXAMINATION: CT OF THE HEAD WITHOUT CONTRAST  5/28/2022 4:17 pm TECHNIQUE: CT of the head was performed without the administration of intravenous contrast. Automated exposure control, iterative reconstruction, and/or weight based adjustment of the mA/kV was utilized to reduce the radiation dose to as low as reasonably achievable. COMPARISON: None. HISTORY: ORDERING SYSTEM PROVIDED HISTORY: Dizziness x 2 weeks TECHNOLOGIST PROVIDED HISTORY: Reason for exam:->Dizziness x 2 weeks Has a \"code stroke\" or \"stroke alert\" been called? ->No Decision Support Exception - unselect if not a suspected or confirmed emergency medical condition->Emergency Medical Condition (MA) FINDINGS: BRAIN/VENTRICLES: There is no acute intracranial hemorrhage, mass effect or midline shift. No abnormal extra-axial fluid collection. The gray-white differentiation is maintained without evidence of an acute infarct. There is no evidence of hydrocephalus. ORBITS: The visualized portion of the orbits demonstrate no acute abnormality.  SINUSES: The visualized paranasal sinuses and mastoid air cells demonstrate no acute abnormality. SOFT TISSUES/SKULL:  No acute abnormality of the visualized skull or soft tissues. No acute intracranial abnormality. PROCEDURES   Unless otherwise noted below, none     Procedures           CONSULTS:  None      EMERGENCY DEPARTMENT COURSE and DIFFERENTIAL DIAGNOSIS/MDM:   Vitals:    Vitals:    05/28/22 1531 05/28/22 1659   BP: (!) 177/78    Pulse: 77    Resp: 18    Temp: 97.9 °F (36.6 °C)    TempSrc: Oral    SpO2: 98% 98%   Weight: 268 lb 4.8 oz (121.7 kg)    Height: 5' 7\" (1.702 m)        Patient was given the following medications:  Medications   cephALEXin (KEFLEX) capsule 500 mg (500 mg Oral Given 5/28/22 1900)         Is this patient to be included in the SEP-1 Core Measure due to severe sepsis or septic shock? No   Exclusion criteria - the patient is NOT to be included for SEP-1 Core Measure due to:  2+ SIRS criteria are not met    ED COURSE & MEDICAL DECISION MAKING    - The patient presented to the ER with complaints of lightheadeness, chills x 2 weeks. Vital signs were reviewed. Exam as above. Peripheral IV placed. Labs, Imaging ordered. - Pertinent Labs & Imaging studies reviewed. (See chart for details)   -  Patient seen and evaluated in the emergency department. -  Triage and nursing notes reviewed and incorporated. -  Old chart records reviewed and incorporated. -  VANCE. I have evaluated this patient. My supervising physician was available for consultation.  -  Differential diagnosis includes: benign positional vertigo, labyrinthitis/otitis, sepsis, dehydration/orthostasis, vasovagal reaction, stroke, TIA, intracranial bleed, migraine, anxiety, ACS/dysrhythmia, medication side effects, head trauma  -  Work-up included:  See above  -  ED treatment included:   Patient states meclizine does not help, she tolerates a p.o. challenge without difficulty  -  Results discussed with patient and/or family.   Labs show no leukocytosis or concerning anemia, metabolic panel with no concerning abnormalities. Troponin is less than 0.01, TSH is not elevated at 0.78. An A1c is sent and pending. Her urine does show evidence of a mild urinary tract infection, will treat upon discharge. Imaging studies show chronic changes on chest x-ray, with no acute abnormalities. CT of the head shows no acute intracranial abnormality. Patient ambulates in the emergency department without difficulty, she has no neurological deficits on physical exam.  Please see attending physician's note for EKG interpretation. I discussed this with the ER attending physician, but he did not physically evaluate the patient. Her NIH stroke scale is 0. Her symptoms have been present for the last 2 weeks. Her work-up is overall reassuring. At this time, we recommend discharge, as the patient is stable for outpatient management. She is encouraged to follow-up with her primary care provider, keep a record of her blood pressure at home. The patient and/or family is agreeable with plan of care and disposition.  -  Disposition:   Home in stable condition  - Critical Care: 0 minutes    FINAL IMPRESSION      1.  Urinary tract infection without hematuria, site unspecified          DISPOSITION/PLAN   DISPOSITION Decision To Discharge 05/28/2022 06:44:41 PM      PATIENT REFERRED TO:  Batool Garner MD  [address] [facility],[address]  [phone]    Schedule an appointment as soon as possible for a visit       [facility]  [address] [place] [address]  [phone]    If symptoms worsen      DISCHARGE MEDICATIONS:  Discharge Medication List as of 5/28/2022  6:52 PM      START taking these medications    Details   cephALEXin (KEFLEX) 500 MG capsule Take 1 capsule by mouth 2 times daily for 5 days, Disp-10 capsule, R-0Normal             DISCONTINUED MEDICATIONS:  Discharge Medication List as of 5/28/2022  6:52 PM      STOP taking these

## 2022-05-29 LAB
EKG ATRIAL RATE: 68 BPM
EKG DIAGNOSIS: NORMAL
EKG P AXIS: 11 DEGREES
EKG P-R INTERVAL: 134 MS
EKG Q-T INTERVAL: 420 MS
EKG QRS DURATION: 92 MS
EKG QTC CALCULATION (BAZETT): 446 MS
EKG R AXIS: -1 DEGREES
EKG T AXIS: 16 DEGREES
EKG VENTRICULAR RATE: 68 BPM
ESTIMATED AVERAGE GLUCOSE: 111.2 MG/DL
HBA1C MFR BLD: 5.5 %

## 2022-05-29 PROCEDURE — 93010 ELECTROCARDIOGRAM REPORT: CPT | Performed by: INTERNAL MEDICINE

## 2022-05-29 ASSESSMENT — ENCOUNTER SYMPTOMS
SHORTNESS OF BREATH: 0
ABDOMINAL PAIN: 0
CHEST TIGHTNESS: 0
NAUSEA: 0
COUGH: 0
VOMITING: 0
PHOTOPHOBIA: 0

## 2022-05-29 NOTE — ED PROVIDER NOTES
Patient was seen and evaluated independently by VANCE. I was immediately available for consultation if needed. This note is provided for EKG interpretation only.     EKG:    EKG was reviewed by emergency department physician in the absence of a cardiologist    Narrow complex sinus rhythm, rate 68, normal axis, normal AK and QRS intervals, normal Qtc, no ST elevations or depressions, normal t-wave morphology, impression NSR, no STEMI       Tracy Willard DO  05/28/22 2016

## 2022-05-31 ENCOUNTER — TELEPHONE (OUTPATIENT)
Dept: INTERNAL MEDICINE CLINIC | Age: 72
End: 2022-05-31

## 2022-05-31 NOTE — TELEPHONE ENCOUNTER
----- Message from Renard Hargrove sent at 5/31/2022  9:18 AM EDT -----  Subject: Appointment Request    Reason for Call: Routine ED Follow Up Visit    QUESTIONS  Type of Appointment? Established Patient  Reason for appointment request? No appointments available during search  Additional Information for Provider? On 5/28, the pt went to the er for   light headedness, Is there any way we can get her in for a f/u? . bp is   high per ER high as well.  ---------------------------------------------------------------------------  --------------  CALL BACK INFO  What is the best way for the office to contact you? OK to leave message on   voicemail  Preferred Call Back Phone Number? [phone]  ---------------------------------------------------------------------------  --------------  SCRIPT ANSWERS  Relationship to Patient? Self  (Patient requests to see provider urgently. )? No  Do you have any questions for your primary care provider that need to be   answered prior to your appointment? No  Have you been diagnosed with, awaiting test results for, or told that you   are suspected of having COVID-19 (Coronavirus)? (If patient has tested   negative or was tested as a requirement for work, school, or travel and   not based on symptoms, answer no)? No  Within the past 10 days have you developed any of the following symptoms   (answer no if symptoms have been present longer than 10 days or began   more than 10 days ago)? Fever or Chills, Cough, Shortness of breath or   difficulty breathing, Loss of taste or smell, Sore throat, Nasal   congestion, Sneezing or runny nose, Fatigue or generalized body aches   (answer no if pain is specific to a body part e.g. back pain), Diarrhea,   Headache? No  Have you had close contact with someone with COVID-19 in the last 7 days? No  (Service Expert  click yes below to proceed with [employer]ual   Scheduling)?  Yes

## 2022-06-01 ENCOUNTER — OFFICE VISIT (OUTPATIENT)
Dept: INTERNAL MEDICINE CLINIC | Age: 72
End: 2022-06-01
Payer: COMMERCIAL

## 2022-06-01 VITALS
HEIGHT: 67 IN | SYSTOLIC BLOOD PRESSURE: 142 MMHG | TEMPERATURE: 97.2 F | HEART RATE: 75 BPM | WEIGHT: 268.2 LBS | BODY MASS INDEX: 42.09 KG/M2 | OXYGEN SATURATION: 98 % | DIASTOLIC BLOOD PRESSURE: 80 MMHG

## 2022-06-01 DIAGNOSIS — H93.12 TINNITUS OF LEFT EAR: Primary | ICD-10-CM

## 2022-06-01 DIAGNOSIS — N30.00 ACUTE CYSTITIS WITHOUT HEMATURIA: ICD-10-CM

## 2022-06-01 LAB — URINE CULTURE, ROUTINE: NORMAL

## 2022-06-01 PROCEDURE — 99213 OFFICE O/P EST LOW 20 MIN: CPT | Performed by: HOSPITALIST

## 2022-06-01 PROCEDURE — 1123F ACP DISCUSS/DSCN MKR DOCD: CPT | Performed by: HOSPITALIST

## 2022-06-01 ASSESSMENT — ENCOUNTER SYMPTOMS
SINUS PRESSURE: 0
TROUBLE SWALLOWING: 0
ABDOMINAL PAIN: 0
COUGH: 0
CHEST TIGHTNESS: 0
VOMITING: 0
SINUS PAIN: 0
SORE THROAT: 0
EYE DISCHARGE: 1
NAUSEA: 0
CONSTIPATION: 0
VOICE CHANGE: 0
SHORTNESS OF BREATH: 0
ABDOMINAL DISTENTION: 0
BLOOD IN STOOL: 0
DIARRHEA: 0
WHEEZING: 0
BACK PAIN: 0

## 2022-06-01 NOTE — TELEPHONE ENCOUNTER
[phone] (home) [phone] (work)  Left message on machine  To call back for appt.  F/u ER (headaches)

## 2022-06-01 NOTE — PROGRESS NOTES
[address] [facility]  Acute visit   2022    Starr Yip (:  1950) is a 70 y.o. female, here for evaluation of the following medical concerns:    Chief Complaint   Patient presents with   Yehuda Mishra ED Follow-up        HPI  -The patient has intermittent head congestion with ringing in the left ear. Recent extensive workup 2022 in the ED including a head CT, Chest X-ray, EKG, complete labs and U/A (with mild infection). She is completing a course of Keflex. ROS  Review of Systems   Constitutional: Positive for chills. Negative for activity change, appetite change, diaphoresis, fatigue, fever and unexpected weight change. HENT: Positive for tinnitus (Left ear). Negative for sinus pressure, sinus pain, sore throat, trouble swallowing and voice change. Eyes: Positive for discharge (In the am. ). Negative for visual disturbance. Respiratory: Negative for cough, chest tightness, shortness of breath and wheezing. Cardiovascular: Negative for chest pain, palpitations and leg swelling. Gastrointestinal: Negative for abdominal distention, abdominal pain, blood in stool, constipation, diarrhea, nausea and vomiting. Endocrine: Negative for polydipsia and polyphagia. Genitourinary: Positive for frequency. Negative for decreased urine volume, difficulty urinating, dysuria and urgency. Musculoskeletal: Positive for neck pain. Negative for back pain, gait problem, joint swelling and myalgias. Neurological: Positive for light-headedness. Negative for dizziness, seizures, syncope and headaches. Psychiatric/Behavioral: Negative for agitation, behavioral problems, confusion and suicidal ideas.        HISTORIES  Current Outpatient Medications on File Prior to Visit   Medication Sig Dispense Refill    cephALEXin (KEFLEX) 500 MG capsule Take 1 capsule by mouth 2 times daily for 5 days 10 capsule 0    cholestyramine (QUESTRAN) 4 g packet USE ONE PACKET IN WATER 2 TIMES DAILY 180 packet 1  losartan (COZAAR) 100 MG tablet TAKE 1 TABLET BY MOUTH EVERY DAY 90 tablet 3    omeprazole (PRILOSEC) 40 MG delayed release capsule        No current facility-administered medications on file prior to visit.       Allergies   Allergen Reactions    Ciprofloxacin      G.I. Upset     Past Medical History:   Diagnosis Date    Acquired lymphedema of lower extremity     Esophageal reflux     Flushing     HTN (hypertension)     Idiopathic chronic venous hypertension of left lower extremity with inflammation     Labyrinthitis, unspecified     Lumbar disc disease     traumatic fall    Migraine     Pain in limb     left leg    Screening mammogram 02/29/2008    (Both)Negative     Patient Active Problem List   Diagnosis    Hypertension    Migraine headache    Edema    Loose stools    Microhematuria    Vitamin D deficiency    Esophageal dysphagia    Degeneration of meniscus of right knee    Degenerative arthritis of right knee    Chronic low back pain     Past Surgical History:   Procedure Laterality Date    APPENDECTOMY      CHOLECYSTECTOMY      COLONOSCOPY  11/2014    negative - Dr Yolanda Ivy    UPPER GASTROINTESTINAL ENDOSCOPY  09/2019    Small sliding hiatal hernia     Social History     Socioeconomic History    Marital status: Single     Spouse name: Not on file    Number of children: Not on file    Years of education: Not on file    Highest education level: Not on file   Occupational History    Not on file   Tobacco Use    Smoking status: Never Smoker    Smokeless tobacco: Never Used   Substance and Sexual Activity    Alcohol use: No    Drug use: No    Sexual activity: Not on file   Other Topics Concern    Not on file   Social History Narrative    Not on file     Social Determinants of Health     Financial Resource Strain: Low Risk     Difficulty of Paying Living Expenses: Not hard at all   Food Insecurity: No Food Insecurity    Worried About [facility] in the Last Year: Never true    Ran Out of Food in the Last Year: Never true   Transportation Needs:     Lack of Transportation (Medical): Not on file    Lack of Transportation (Non-Medical): Not on file   Physical Activity:     Days of Exercise per Week: Not on file    Minutes of Exercise per Session: Not on file   Stress:     Feeling of Stress : Not on file   Social Connections:     Frequency of Communication with Friends and Family: Not on file    Frequency of Social Gatherings with Friends and Family: Not on file    Attends [employer] Services: Not on file    Active Member of [address] [place] [address] [employer] or Organizations: Not on file    Attends Club or Organization Meetings: Not on file    Marital Status: Not on file   Intimate Partner Violence:     Fear of Current or Ex-Partner: Not on file    Emotionally Abused: Not on file    Physically Abused: Not on file    Sexually Abused: Not on file   Housing Stability:     Unable to Pay for Housing in the Last Year: Not on file    Number of [place] in the Last Year: Not on file    Unstable Housing in the Last Year: Not on file      Family History   Problem Relation Age of Onset    Cancer Mother         skin, SCC       PE  Vitals:    06/01/22 1632 06/01/22 1637   BP: (!) 150/82 (!) 142/80   Site: Right Upper Arm Left Upper Arm   Position: Sitting Sitting   Pulse: 75    Temp: 97.2 °F (36.2 °C)    SpO2: 98%    Weight: 268 lb 3.2 oz (121.7 kg)    Height: 5' 7\" (1.702 m)      Estimated body mass index is 42.01 kg/m² as calculated from the following:    Height as of this encounter: 5' 7\" (1.702 m). Weight as of this encounter: 268 lb 3.2 oz (121.7 kg). Physical Exam  Vitals reviewed. Constitutional:       General: She is not in acute distress. Appearance: Normal appearance. HENT:      Head: Normocephalic and atraumatic. Mouth/Throat:      Pharynx: Oropharynx is clear.    Eyes:      Conjunctiva/sclera: Conjunctivae normal.      Pupils: Pupils are equal, round, and reactive to light. Cardiovascular:      Rate and Rhythm: Normal rate and regular rhythm. Pulses: Normal pulses. Heart sounds: Normal heart sounds. Pulmonary:      Effort: Pulmonary effort is normal. No respiratory distress. Breath sounds: Normal breath sounds. No wheezing or rales. Abdominal:      Palpations: Abdomen is soft. Tenderness: There is no abdominal tenderness. There is no rebound. Musculoskeletal:         General: No signs of injury. Normal range of motion. Cervical back: Normal range of motion and neck supple. Skin:     General: Skin is warm and dry. Coloration: Skin is not jaundiced. Neurological:      General: No focal deficit present. Mental Status: She is alert and oriented to person, place, and time. Psychiatric:         Behavior: Behavior normal.         Thought Content: Thought content normal.         Judgment: Judgment normal.         ASSESSMENT/ PLAN:  1. Tinnitus of left ear  -The symptoms are recurrent/chronic. She had a similar prior episode 20-25 years ago and had an [facility], Neurology Consult, ENT consult. She says the investigation was unfruitful and her sx \"never truly went away. \"    -At this point we will observe. RTC three weeks for reassessment. 2. Acute cystitis without hematuria  -Continue Keflex. Return in about 3 weeks (around 6/22/2022). Rebeca Manzano MD    This dictation was generated by voice recognition computer software. Although all attempts are made to edit the dictation for accuracy, there may be errors in the transcription that are not intended.

## 2022-06-22 ENCOUNTER — OFFICE VISIT (OUTPATIENT)
Dept: INTERNAL MEDICINE CLINIC | Age: 72
End: 2022-06-22
Payer: COMMERCIAL

## 2022-06-22 VITALS
BODY MASS INDEX: 40.65 KG/M2 | OXYGEN SATURATION: 97 % | HEART RATE: 81 BPM | SYSTOLIC BLOOD PRESSURE: 122 MMHG | HEIGHT: 67 IN | WEIGHT: 259 LBS | DIASTOLIC BLOOD PRESSURE: 78 MMHG | TEMPERATURE: 97.4 F

## 2022-06-22 DIAGNOSIS — R42 DIZZINESS: Primary | ICD-10-CM

## 2022-06-22 PROCEDURE — 99214 OFFICE O/P EST MOD 30 MIN: CPT | Performed by: HOSPITALIST

## 2022-06-22 PROCEDURE — 1123F ACP DISCUSS/DSCN MKR DOCD: CPT | Performed by: HOSPITALIST

## 2022-06-22 ASSESSMENT — ENCOUNTER SYMPTOMS
SORE THROAT: 0
DIARRHEA: 0
ABDOMINAL DISTENTION: 0
CHEST TIGHTNESS: 0
ABDOMINAL PAIN: 0
CONSTIPATION: 0
TROUBLE SWALLOWING: 0
BACK PAIN: 0
SINUS PAIN: 0
COUGH: 0
SINUS PRESSURE: 0
VOICE CHANGE: 0
BLOOD IN STOOL: 0
VOMITING: 0
SHORTNESS OF BREATH: 0
NAUSEA: 0
WHEEZING: 0

## 2022-06-22 NOTE — PROGRESS NOTES
[address] Internal Medicine  Follow-up visit   2022    Joss Burris (:  1950) is a 67 y.o. female, here for follow-up:    Chief Complaint   Patient presents with    Dizziness     follow up         HPI  The patient had a dizzy episode last 2022. She denies any truncal ataxia. She did not fel bad enoug to call [facility]. She had a similar episode  and had a normal head CT. She related the episode to high consumption of salt and caffine that day. ROS  Review of Systems   Constitutional: Negative for activity change, appetite change, chills, diaphoresis, fatigue, fever and unexpected weight change. HENT: Negative for sinus pressure, sinus pain, sore throat, trouble swallowing and voice change. Eyes: Negative for visual disturbance. Respiratory: Negative for cough, chest tightness, shortness of breath and wheezing. Cardiovascular: Negative for chest pain, palpitations and leg swelling. Gastrointestinal: Negative for abdominal distention, abdominal pain, blood in stool, constipation, diarrhea, nausea and vomiting. Endocrine: Negative for polydipsia and polyphagia. Genitourinary: Negative for decreased urine volume, difficulty urinating, dysuria and urgency. Musculoskeletal: Negative for back pain, gait problem, joint swelling and myalgias. Neurological: Negative for dizziness, seizures, syncope, light-headedness and headaches. Psychiatric/Behavioral: Negative for agitation, behavioral problems, confusion and suicidal ideas. HISTORIES  Current Outpatient Medications on File Prior to Visit   Medication Sig Dispense Refill    cholestyramine (QUESTRAN) 4 g packet USE ONE PACKET IN WATER 2 TIMES DAILY 180 packet 1    losartan (COZAAR) 100 MG tablet TAKE 1 TABLET BY MOUTH EVERY DAY 90 tablet 3    omeprazole (PRILOSEC) 40 MG delayed release capsule        No current facility-administered medications on file prior to visit.       Allergies   Allergen Reactions    Ciprofloxacin      G.I. Upset     Past Medical History:   Diagnosis Date    Acquired lymphedema of lower extremity     Esophageal reflux     Flushing     HTN (hypertension)     Idiopathic chronic venous hypertension of left lower extremity with inflammation     Labyrinthitis, unspecified     Lumbar disc disease     traumatic fall    Migraine     Pain in limb     left leg    Screening mammogram 02/29/2008    (Both)Negative     Patient Active Problem List   Diagnosis    Hypertension    Migraine headache    Edema    Loose stools    Microhematuria    Vitamin D deficiency    Esophageal dysphagia    Degeneration of meniscus of right knee    Degenerative arthritis of right knee    Chronic low back pain     Past Surgical History:   Procedure Laterality Date    APPENDECTOMY      CHOLECYSTECTOMY      COLONOSCOPY  11/2014    negative - Dr Tommy Valadez    UPPER GASTROINTESTINAL ENDOSCOPY  09/2019    Small sliding hiatal hernia     Social History     Socioeconomic History    Marital status: Single     Spouse name: Not on file    Number of children: Not on file    Years of education: Not on file    Highest education level: Not on file   Occupational History    Not on file   Tobacco Use    Smoking status: Never Smoker    Smokeless tobacco: Never Used   Substance and Sexual Activity    Alcohol use: No    Drug use: No    Sexual activity: Not on file   Other Topics Concern    Not on file   Social History Narrative    Not on file     Social Determinants of Health     Financial Resource Strain: Low Risk     Difficulty of Paying Living Expenses: Not hard at all   Food Insecurity: No Food Insecurity    Worried About Running Out of Food in the Last Year: Never true    Reina of Food in the Last Year: Never true   Transportation Needs:     Lack of Transportation (Medical): Not on file    Lack of Transportation (Non-Medical):  Not on file   Physical Activity:     Days of Exercise per Tenderness: There is no abdominal tenderness. There is no rebound. Musculoskeletal:         General: No signs of injury. Normal range of motion. Cervical back: Normal range of motion and neck supple. Skin:     General: Skin is warm and dry. Coloration: Skin is not jaundiced. Neurological:      General: No focal deficit present. Mental Status: She is alert and oriented to person, place, and time. Psychiatric:         Behavior: Behavior normal.         Thought Content: Thought content normal.         Judgment: Judgment normal.         ASSESSMENT/ PLAN:  1. Dizziness  - ECHO 2D WO Color Doppler Complete; Future  - MRI BRAIN W WO CONTRAST; Future  - VL DUP CAROTID BILATERAL; Future       Orders Placed This Encounter   Procedures    MRI BRAIN W WO CONTRAST    VL DUP CAROTID BILATERAL    ECHO 2D WO Color Doppler Complete      No orders of the defined types were placed in this encounter. There are no discontinued medications. No follow-ups on file. Lisa Leal MD    This dictation was generated by voice recognition computer software. Although all attempts are made to edit the dictation for accuracy, there may be errors in the transcription that are not intended.

## 2022-06-24 ENCOUNTER — OFFICE VISIT (OUTPATIENT)
Dept: ORTHOPEDIC SURGERY | Age: 72
End: 2022-06-24
Payer: COMMERCIAL

## 2022-06-24 VITALS — BODY MASS INDEX: 40.65 KG/M2 | HEIGHT: 67 IN | WEIGHT: 259 LBS

## 2022-06-24 DIAGNOSIS — E66.01 CLASS 3 SEVERE OBESITY DUE TO EXCESS CALORIES WITHOUT SERIOUS COMORBIDITY WITH BODY MASS INDEX (BMI) OF 40.0 TO 44.9 IN ADULT (HCC): ICD-10-CM

## 2022-06-24 DIAGNOSIS — M17.11 PRIMARY OSTEOARTHRITIS OF RIGHT KNEE: Primary | ICD-10-CM

## 2022-06-24 DIAGNOSIS — M25.561 RIGHT KNEE PAIN, UNSPECIFIED CHRONICITY: ICD-10-CM

## 2022-06-24 PROCEDURE — 99213 OFFICE O/P EST LOW 20 MIN: CPT | Performed by: ORTHOPAEDIC SURGERY

## 2022-06-24 PROCEDURE — 1123F ACP DISCUSS/DSCN MKR DOCD: CPT | Performed by: ORTHOPAEDIC SURGERY

## 2022-06-24 NOTE — PROGRESS NOTES
Dr Donte Breen      Date /Time 6/24/2022       10:58 AM EDT  Name Caprice Solares             1950   Location  [facility]  MRN [id number]                Chief Complaint   Patient presents with    Knee Pain     CK RIGHT KNEE        History of Present Illness  Caprice Solares is a 67 y.o. female who presents with  right knee pain. 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 Athletic/exercise activity: no sports. Injury Mechanism:  none. Worker's Comp. & legal issues:   none. Previous Treatments: Ice, Heat, NSAIDs, pain medication and Cortisone Injections Previous injection done    Patient presents the office today for a follow-up visit. Patient being treated for right knee osteoarthritis. She did receive a cortisone injection approximately 3 months ago. Her pain has started to return. Symptoms global in nature. Increased pain with activities and improvement with rest.  No new injury. Previous history: She reports right knee pain for the last 2 years. She has pain with activity. Pain is worse over the lateral as well as medial aspects of the knee. She has profound stiffness present in both knees. She did have a history of lymphedema a couple of years ago. She underwent a right cortisone injection done by Dr. Jazmine Do at that time which did help for some time. She is interested in another injection at this time.     Past History  Past Medical History:   Diagnosis Date    Acquired lymphedema of lower extremity     Esophageal reflux     Flushing     HTN (hypertension)     Idiopathic chronic venous hypertension of left lower extremity with inflammation     Labyrinthitis, unspecified     Lumbar disc disease     traumatic fall    Migraine     Pain in limb     left leg    Screening mammogram 02/29/2008    (Both)Negative     Past Surgical History:   Procedure Laterality Date    APPENDECTOMY      CHOLECYSTECTOMY      COLONOSCOPY  11/2014    negative - Dr Hayden Ambriz    UPPER GASTROINTESTINAL ENDOSCOPY  09/2019    Small sliding hiatal hernia     Social History     Tobacco Use    Smoking status: Never Smoker    Smokeless tobacco: Never Used   Substance Use Topics    Alcohol use: No      Current Outpatient Medications on File Prior to Visit   Medication Sig Dispense Refill    cholestyramine (QUESTRAN) 4 g packet USE ONE PACKET IN WATER 2 TIMES DAILY 180 packet 1    losartan (COZAAR) 100 MG tablet TAKE 1 TABLET BY MOUTH EVERY DAY 90 tablet 3    omeprazole (PRILOSEC) 40 MG delayed release capsule        No current facility-administered medications on file prior to visit. ASCVD 10-YEAR RISK SCORE  The 10-year ASCVD risk score (Cassy Bellamy, et al., 2013) is: 13.7%    Values used to calculate the score:      Age: 67 years      Sex: Female      Is Non- : No      Diabetic: No      Tobacco smoker: No      Systolic Blood Pressure: 339 mmHg      Is BP treated: Yes      HDL Cholesterol: 53 mg/dL      Total Cholesterol: 165 mg/dL     Review of Systems  10-point ROS is negative other than HPI. Physical Exam  Based off 1997 Exam Criteria  Ht 5' 7\" (1.702 m)   Wt 259 lb (117.5 kg)   BMI 40.57 kg/m²      Constitutional:       General: He is not in acute distress. Morbidly obese. Appearance: Normal appearance. Cardiovascular:      Rate and Rhythm: Normal rate and regular rhythm. Pulses: Normal pulses. Pulmonary:      Effort: Pulmonary effort is normal. No respiratory distress. Neurological:      Mental Status: He is alert and oriented to person, place, and time. Mental status is at baseline. Musculoskeletal:  Gait:  antalgic  Alvin Hip: Pain-free range of motion of bilateral hips, negative impingement sign    R Knee: Point tenderness lateral joint line and medial joint line. Right knee has constitutional valgus alignment. She is wearing compression socks today. No skin findings.   Mild global swelling present in the leg  Consistent with known lymphedema. Range of motion limited 10 to 80 degrees  L Knee:  Point tenderness lateral joint line and medial joint line. Right knee has constitutional valgus alignment. She is wearing compression socks today. No skin findings. Mild global swelling present in the leg  Consistent with known lymphedema. Range of motion limited 10 to 80 degrees. Imaging  Right Knee: [address]  Previous Radiographs: Valgus gonarthrosis, tricompartmental arthritis with joint space narrowing and osteophytes visible. Left knee has varus gonarthrosis      Procedure:  No orders of the defined types were placed in this encounter. Assessment and Plan  Cathy Solorzano was seen today for knee pain. Diagnoses and all orders for this visit:    Primary osteoarthritis of right knee    Right knee pain, unspecified chronicity    Class 3 severe obesity due to excess calories without serious comorbidity with body mass index (BMI) of 40.0 to 44.9 in [facility])         Patient has started to have symptoms again. She would like to proceed with a series of viscosupplementation injections. She will follow-up in office once approved for the injections to start her series. We have also discussed obesity and appropriate weight control in an effort to minimize pain symptoms. I discussed with Caprice Solares that her history, symptoms, signs and imaging are most consistent with knee arthritis, morbid obesity. We discussed weight loss as potential treatment for arthritis as well as perioperative implications of weight. We reviewed the natural history of these conditions and treatment options ranging from conservative measures (rest, icing, activity modification, physical therapy, pain meds, cortisone injection) to surgical options.      In terms of treatment, I recommended continuing with rest, icing, avoidance of painful activities, NSAIDs or pain meds as

## 2022-07-02 ENCOUNTER — HOSPITAL ENCOUNTER (OUTPATIENT)
Dept: MRI IMAGING | Age: 72
Discharge: HOME | End: 2022-07-02
Payer: COMMERCIAL

## 2022-07-02 DIAGNOSIS — R42 DIZZINESS: ICD-10-CM

## 2022-07-02 PROCEDURE — A9577 INJ MULTIHANCE: HCPCS | Performed by: HOSPITALIST

## 2022-07-02 PROCEDURE — 6360000004 HC RX CONTRAST MEDICATION: Performed by: HOSPITALIST

## 2022-07-02 PROCEDURE — 70553 MRI BRAIN STEM W/O & W/DYE: CPT

## 2022-07-02 RX ADMIN — GADOBENATE DIMEGLUMINE 20 ML: 529 INJECTION, SOLUTION INTRAVENOUS at 09:24

## 2022-07-08 ENCOUNTER — TELEPHONE (OUTPATIENT)
Dept: ORTHOPEDIC SURGERY | Age: 72
End: 2022-07-08

## 2022-07-08 NOTE — TELEPHONE ENCOUNTER
Other PATIENT STATES THAT SHE WANTED TO CHECK ON THE STATUS FOR HER EUFLEXXA INJECTIONS.  PLS CALL TO ADVISE [phone]

## 2022-07-12 ENCOUNTER — OFFICE VISIT (OUTPATIENT)
Dept: INTERNAL MEDICINE CLINIC | Age: 72
End: 2022-07-12
Payer: COMMERCIAL

## 2022-07-12 VITALS
BODY MASS INDEX: 40.34 KG/M2 | DIASTOLIC BLOOD PRESSURE: 72 MMHG | SYSTOLIC BLOOD PRESSURE: 126 MMHG | WEIGHT: 257 LBS | HEIGHT: 67 IN | OXYGEN SATURATION: 98 % | HEART RATE: 70 BPM

## 2022-07-12 DIAGNOSIS — E55.9 VITAMIN D DEFICIENCY: ICD-10-CM

## 2022-07-12 DIAGNOSIS — H93.12 TINNITUS OF LEFT EAR: ICD-10-CM

## 2022-07-12 DIAGNOSIS — I10 PRIMARY HYPERTENSION: Primary | ICD-10-CM

## 2022-07-12 DIAGNOSIS — Z13.9 SCREENING DUE: ICD-10-CM

## 2022-07-12 DIAGNOSIS — I73.9 PERIPHERAL ARTERIAL DISEASE (HCC): ICD-10-CM

## 2022-07-12 PROCEDURE — 99214 OFFICE O/P EST MOD 30 MIN: CPT | Performed by: HOSPITALIST

## 2022-07-12 PROCEDURE — 1123F ACP DISCUSS/DSCN MKR DOCD: CPT | Performed by: HOSPITALIST

## 2022-07-12 NOTE — PROGRESS NOTES
[facility] Internal Medicine  Follow-up visit   2022    Sara Gill (:  1950) is a 67 y.o. female, here for follow-up:    Chief Complaint   Patient presents with    Hypertension     follow up         HPI  1. Tinnitus/dizziness:  Patient states dizziness is improving but she continues to get dizzy/lightheaded at times. Had an episode this morning while working on the computer. Reviewed her MRI Brain W/Wo of 2022. She has an ECHO and carotid duplex coming up. The symptoms are recurrent/chronic. She had a similar prior episode 20-25 years ago and had an [facility], Neurology Consult, ENT consult. She says the investigation was unfruitful and her sx \"never truly went away. \"           ROS  Constitutional: Positive for chills. Negative for activity change, appetite change, diaphoresis, fatigue, fever and unexpected weight change. HENT: Positive for tinnitus (Left ear). Negative for sinus pressure, sinus pain, sore throat, trouble swallowing and voice change. Eyes: Positive for discharge (In the am. ). Negative for visual disturbance. Respiratory: Negative for cough, chest tightness, shortness of breath and wheezing. Cardiovascular: Negative for chest pain, palpitations and leg swelling. Gastrointestinal: Negative for abdominal distention, abdominal pain, blood in stool, constipation, diarrhea, nausea and vomiting. Endocrine: Negative for polydipsia and polyphagia. Genitourinary: Positive for frequency. Negative for decreased urine volume, difficulty urinating, dysuria and urgency. Musculoskeletal: Positive for neck pain. Negative for back pain, gait problem, joint swelling and myalgias. Neurological: Positive for light-headedness. Negative for dizziness, seizures, syncope and headaches. Psychiatric/Behavioral: Negative for agitation, behavioral problems, confusion and suicidal ideas.        HISTORIES  Current Outpatient Medications on File Prior to Visit   Medication Sig Dispense Refill    cholestyramine (QUESTRAN) 4 g packet USE ONE PACKET IN WATER 2 TIMES DAILY 180 packet 1    losartan (COZAAR) 100 MG tablet TAKE 1 TABLET BY MOUTH EVERY DAY 90 tablet 3    omeprazole (PRILOSEC) 40 MG delayed release capsule        No current facility-administered medications on file prior to visit.       Allergies   Allergen Reactions    Ciprofloxacin      G.I. Upset     Past Medical History:   Diagnosis Date    Acquired lymphedema of lower extremity     Esophageal reflux     Flushing     HTN (hypertension)     Idiopathic chronic venous hypertension of left lower extremity with inflammation     Labyrinthitis, unspecified     Lumbar disc disease     traumatic fall    Migraine     Pain in limb     left leg    Screening mammogram 02/29/2008    (Both)Negative     Patient Active Problem List   Diagnosis    Hypertension    Migraine headache    Edema    Loose stools    Microhematuria    Lightheadedness    Vitamin D deficiency    Esophageal dysphagia    Degeneration of meniscus of right knee    Degenerative arthritis of right knee    Chronic low back pain    Tinnitus of left ear     Past Surgical History:   Procedure Laterality Date    APPENDECTOMY      CHOLECYSTECTOMY      COLONOSCOPY  11/2014    negative - Dr Danilo Long    UPPER GASTROINTESTINAL ENDOSCOPY  09/2019    Small sliding hiatal hernia     Social History     Socioeconomic History    Marital status: Single     Spouse name: Not on file    Number of children: Not on file    Years of education: Not on file    Highest education level: Not on file   Occupational History    Not on file   Tobacco Use    Smoking status: Never Smoker    Smokeless tobacco: Never Used   Substance and Sexual Activity    Alcohol use: No    Drug use: No    Sexual activity: Not on file   Other Topics Concern    Not on file   Social History Narrative    Not on file     Social Determinants of Health     Financial Resource Strain: Low Risk  Difficulty of Paying Living Expenses: Not hard at all   Food Insecurity: No Food Insecurity    Worried About Running Out of Food in the Last Year: Never true    Ran Out of Food in the Last Year: Never true   Transportation Needs:     Lack of Transportation (Medical): Not on file    Lack of Transportation (Non-Medical): Not on file   Physical Activity:     Days of Exercise per Week: Not on file    Minutes of Exercise per Session: Not on file   Stress:     Feeling of Stress : Not on file   Social Connections:     Frequency of Communication with Friends and Family: Not on file    Frequency of Social Gatherings with Friends and Family: Not on file    Attends Confucianism Services: Not on file    Active Member of [address] [place] [address] [employer] or Organizations: Not on file    Attends Club or Organization Meetings: Not on file    Marital Status: Not on file   Intimate Partner Violence:     Fear of Current or Ex-Partner: Not on file    Emotionally Abused: Not on file    Physically Abused: Not on file    Sexually Abused: Not on file   Housing Stability:     Unable to Pay for Housing in the Last Year: Not on file    Number of [place] in the Last Year: Not on file    Unstable Housing in the Last Year: Not on file      Family History   Problem Relation Age of Onset    Cancer Mother         skin, SCC       PE  Vitals:    07/12/22 1023   BP: 126/72   Pulse: 70   SpO2: 98%   Weight: 257 lb (116.6 kg)   Height: 5' 7\" (1.702 m)     Estimated body mass index is 40.25 kg/m² as calculated from the following:    Height as of this encounter: 5' 7\" (1.702 m). Weight as of this encounter: 257 lb (116.6 kg). Physical Exam  Vitals reviewed. Constitutional:       General: She is not in acute distress. Appearance: Normal appearance. HENT:      Head: Normocephalic and atraumatic. Mouth/Throat:      Pharynx: Oropharynx is clear.    Eyes:      Conjunctiva/sclera: Conjunctivae normal.      Pupils: Pupils are equal, round, and reactive to light. Cardiovascular:      Rate and Rhythm: Normal rate and regular rhythm. Pulses: Normal pulses. Heart sounds: Normal heart sounds. Pulmonary:      Effort: Pulmonary effort is normal. No respiratory distress. Breath sounds: Normal breath sounds. No wheezing or rales. Abdominal:      Palpations: Abdomen is soft. Tenderness: There is no abdominal tenderness. There is no rebound. Musculoskeletal:         General: No signs of injury. Normal range of motion. Cervical back: Normal range of motion and neck supple. Skin:     General: Skin is warm and dry. Coloration: Skin is not jaundiced. Neurological:      General: No focal deficit present. Mental Status: She is alert and oriented to person, place, and time. Psychiatric:         Behavior: Behavior normal.         Thought Content: Thought content normal.         Judgment: Judgment normal.         ASSESSMENT/ PLAN:  1. Tinnitus of left ear  - [facility] - Louisa Funez DO, Otolaryngology, [facility]       Orders Placed This Encounter   Procedures    Comprehensive Metabolic Panel    Lipid, Fasting    CBC    TSH with Reflex    Vitamin D 25 Hydroxy   [facility] - Louisa Funez DO, Otolaryngology, [facility]      No orders of the defined types were placed in this encounter. There are no discontinued medications. Return in about 4 months (around 11/12/2022). Mark Valerio MD    This dictation was generated by voice recognition computer software. Although all attempts are made to edit the dictation for accuracy, there may be errors in the transcription that are not intended.

## 2022-07-18 ENCOUNTER — HOSPITAL ENCOUNTER (OUTPATIENT)
Dept: NON INVASIVE DIAGNOSTICS | Age: 72
Discharge: HOME | End: 2022-07-18
Payer: COMMERCIAL

## 2022-07-18 DIAGNOSIS — R42 DIZZINESS: ICD-10-CM

## 2022-07-18 LAB
LV EF: 58 %
LVEF MODALITY: NORMAL

## 2022-07-18 PROCEDURE — 93306 TTE W/DOPPLER COMPLETE: CPT

## 2022-07-21 ENCOUNTER — TELEPHONE (OUTPATIENT)
Dept: ORTHOPEDIC SURGERY | Age: 72
End: 2022-07-21

## 2022-07-21 NOTE — TELEPHONE ENCOUNTER
Other PATIENT WANTS TO KNOW IF SHE SHOULD STOP TAKING HER ASPIRIN BEFORE HER INJECTIONS AND IF SHE WILL BE ABLE TO DRIVE AFTER.  PLS CALL TO ADVISE [phone]

## 2022-07-25 ENCOUNTER — HOSPITAL ENCOUNTER (OUTPATIENT)
Dept: VASCULAR LAB | Age: 72
Discharge: HOME | End: 2022-07-25
Payer: COMMERCIAL

## 2022-07-25 DIAGNOSIS — R42 DIZZINESS: ICD-10-CM

## 2022-07-25 PROCEDURE — 93880 EXTRACRANIAL BILAT STUDY: CPT

## 2022-07-25 NOTE — RESULT ENCOUNTER NOTE
Please inform patient,    Study was reviewed. We should keep an eye, particularly on the left carotid artery. No further action needs to be taken at the current time.

## 2022-07-28 ENCOUNTER — TELEPHONE (OUTPATIENT)
Dept: INTERNAL MEDICINE CLINIC | Age: 72
End: 2022-07-28

## 2022-07-28 NOTE — RESULT ENCOUNTER NOTE
Please inform the patient there is no flow-limiting stenosis. This is something we need to keep an eye on with serial assesments. No immediate action is necessary.

## 2022-07-29 ENCOUNTER — OFFICE VISIT (OUTPATIENT)
Dept: ORTHOPEDIC SURGERY | Age: 72
End: 2022-07-29
Payer: COMMERCIAL

## 2022-07-29 DIAGNOSIS — M17.11 PRIMARY OSTEOARTHRITIS OF RIGHT KNEE: Primary | ICD-10-CM

## 2022-07-29 PROCEDURE — 20611 DRAIN/INJ JOINT/BURSA W/US: CPT | Performed by: ORTHOPAEDIC SURGERY

## 2022-07-29 RX ORDER — HYALURONATE SODIUM 10 MG/ML
20 SYRINGE (ML) INTRAARTICULAR ONCE
Status: COMPLETED | OUTPATIENT
Start: 2022-07-29 | End: 2022-07-29

## 2022-07-29 RX ADMIN — Medication 20 MG: at 09:40

## 2022-07-29 NOTE — PROGRESS NOTES
Diagnosis: Right knee osteoarthritis    Procedure: Right knee viscosupplementation #1    The patient is symptomatic from osteoarthritis of the right knee joint with documented radiological signs of arthritis. The patient has also failed 3 months of conservative treatment including home exercise, education, Tylenol and/or NSAIDs use. The patient was offered a Visco supplementation today. Risks, benefits, and alternatives to the injections were discussed in detail with the patient. The risks discussed included but are not limited to infection, skin reactions, hot swollen joints, and anaphylaxis. The patient gave verbal informed consent for the injection. The patient's skin was prepped with  3 sterile gauze  pads soaked with alcohol solution and the knee joint was injected with 2 mL of  Euflexxa intra-articularly under sterile conditions. Technique: Under sterile conditions a Son[employer] ultrasound unit with a variable frequency (6.0-15.0 MHz) linear transducer was used to localize the placement of a 22-gauge needle into the knee joint. Findings: Successful needle placement for intra-articular Visco supplementation injection. Final images were taken and saved for permanent record. The patient tolerated the injection reasonably well. The patient was given instructions to ice the kne and avoid strenuous activities for 24-48 hours. The patient was instructed to call the office immediately if there is increased pain, redness, warmth, fever, or chills. We will see the patient back in one week for their second injection.

## 2022-08-01 NOTE — TELEPHONE ENCOUNTER
I left a message for the patient letting her know about her doppler results. Also that dopplers need to be repeated every tow years for monitoring.

## 2022-08-02 ENCOUNTER — OFFICE VISIT (OUTPATIENT)
Dept: ENT CLINIC | Age: 72
End: 2022-08-02
Payer: COMMERCIAL

## 2022-08-02 ENCOUNTER — PROCEDURE VISIT (OUTPATIENT)
Dept: AUDIOLOGY | Age: 72
End: 2022-08-02
Payer: COMMERCIAL

## 2022-08-02 VITALS
DIASTOLIC BLOOD PRESSURE: 78 MMHG | BODY MASS INDEX: 40.34 KG/M2 | SYSTOLIC BLOOD PRESSURE: 168 MMHG | HEIGHT: 67 IN | WEIGHT: 257 LBS | HEART RATE: 69 BPM

## 2022-08-02 DIAGNOSIS — H90.42 SENSORINEURAL HEARING LOSS (SNHL) OF LEFT EAR WITH UNRESTRICTED HEARING OF RIGHT EAR: Primary | ICD-10-CM

## 2022-08-02 DIAGNOSIS — H91.8X9 ASYMMETRICAL HEARING LOSS: ICD-10-CM

## 2022-08-02 DIAGNOSIS — H93.13 TINNITUS, BILATERAL: ICD-10-CM

## 2022-08-02 DIAGNOSIS — R42 DIZZINESS: ICD-10-CM

## 2022-08-02 DIAGNOSIS — H91.90 HEARING LOSS, UNSPECIFIED HEARING LOSS TYPE, UNSPECIFIED LATERALITY: Primary | ICD-10-CM

## 2022-08-02 DIAGNOSIS — R42 LIGHT HEADED: ICD-10-CM

## 2022-08-02 DIAGNOSIS — H93.12 TINNITUS OF LEFT EAR: ICD-10-CM

## 2022-08-02 PROCEDURE — 92504 EAR MICROSCOPY EXAMINATION: CPT | Performed by: OTOLARYNGOLOGY

## 2022-08-02 PROCEDURE — 1123F ACP DISCUSS/DSCN MKR DOCD: CPT | Performed by: OTOLARYNGOLOGY

## 2022-08-02 PROCEDURE — 92557 COMPREHENSIVE HEARING TEST: CPT | Performed by: AUDIOLOGIST

## 2022-08-02 PROCEDURE — 92567 TYMPANOMETRY: CPT | Performed by: AUDIOLOGIST

## 2022-08-02 PROCEDURE — 99204 OFFICE O/P NEW MOD 45 MIN: CPT | Performed by: OTOLARYNGOLOGY

## 2022-08-02 RX ORDER — LOSARTAN POTASSIUM 100 MG/1
TABLET ORAL
Qty: 90 TABLET | Refills: 3 | Status: SHIPPED | OUTPATIENT
Start: 2022-08-02

## 2022-08-02 ASSESSMENT — ENCOUNTER SYMPTOMS
FACIAL SWELLING: 0
VOMITING: 0
BACK PAIN: 0
EYE DISCHARGE: 0
SORE THROAT: 0
EYE ITCHING: 0
STRIDOR: 0
COUGH: 0
SHORTNESS OF BREATH: 0
SINUS PRESSURE: 0
BLOOD IN STOOL: 0
COLOR CHANGE: 0
VOICE CHANGE: 0
PHOTOPHOBIA: 0
CONSTIPATION: 0
WHEEZING: 0
NAUSEA: 0
RHINORRHEA: 0
DIARRHEA: 0
TROUBLE SWALLOWING: 0
CHOKING: 0
SINUS PAIN: 0

## 2022-08-02 NOTE — PROGRESS NOTES
Jam Desai   1950, 67 y.o. female   [id number]       Referring Provider: Mohsen Handy DO  Referral Type: In an order in [address] [place] [address]    Reason for Visit: Evaluation of suspected change in hearing, tinnitus, or balance. ADULT AUDIOLOGIC EVALUATION      Jam Desai is a 67 y.o. female seen today, 8/2/2022, for an initial audiologic evaluation. AUDIOLOGIC AND OTHER PERTINENT MEDICAL HISTORY:        Jam Desai noted tinnitus bilaterally, left ear has been present for 20-25 years, ringing sound, can tune it out or can be very noticeable, right ear can have heartbeat at times, comes and goes; feels she is hearing well at this time; history of dizziness - had dizziness 20-25 years ago, in May 2022, noted some sensation in her head, not bothersome today; mother with hearing aid use with age, no early onset hearing loss in family. Jam Desai denied otalgia, aural fullness, otorrhea, history of occupational/recreational noise exposure, history of head trauma, and history of ear surgery. IMPRESSIONS:       Today's results are consistent with left ear high frequency hearing loss at 8000 Hz only, otherwise within normal limits bilaterally with normal middle ear function and excellent word recognition for soft conversational speech. Discussed tinnitus management strategies; follow medical recommendations from Dr. Geo Valencia. ASSESSMENT AND FINDINGS:       Otoscopy revealed: Clear ear canals bilaterally      RIGHT EAR:  Hearing Sensitivity: Within normal limits. Speech Recognition Threshold: 15 dBHL  Word Recognition: Excellent (100%), based on NU-6 25-word list at 45 dBHL using recorded speech stimuli. Tympanometry: Normal peak pressure and compliance, Type A tympanogram, consistent with normal middle ear function. LEFT EAR:  Hearing Sensitivity: Within normal limits through 6000 Hz precipitously sloping to moderate sensorineural hearing loss at 8000 Hz.   Speech Recognition Threshold: 15 dBHL  Word Recognition: Excellent (100%), based on NU-6 25-word list at 45 dBHL using recorded speech stimuli. Tympanometry: Normal peak pressure and compliance, Type A tympanogram, consistent with normal middle ear function. Reliability: Good  Transducer: Inserts    See scanned audiogram dated 8/2/2022 for results. PATIENT EDUCATION:       The following items were discussed with the patient:   - Good Communication Strategies  - Hearing Loss and Hearing Aids  - Tinnitus Management Strategies      Educational information was shared in the After Visit Summary. RECOMMENDATIONS:                                                                                                                                                                                                                                                                      The following items are recommended based on patient report and results from today's appointment:  - Continue medical follow-up with Brian Rondon DO.  - Retest hearing as medically indicated and/or sooner if a change in hearing is noted. - Utilize \"Good Communication Strategies\" as discussed to assist in speech understanding with communication partners. - Maintain a sound enriched environment to assist in the management of tinnitus symptoms. [facility] [place], [place]  Audiologist      Chart CC'd to:  Brian Rondon DO      Degree of   Hearing Sensitivity dB Range   Within Normal Limits (WNL) 0 - 20   Mild 20 - 40   Moderate 40 - 55   Moderately-Severe 55 - 70   Severe 70 - 90   Profound 90 +

## 2022-08-02 NOTE — PATIENT INSTRUCTIONS
Good Communication Strategies    Communication can be challenging for anyone, but can be especially difficult for those with some degree of hearing loss. While we may not be able to control every factor that may lead to difficulty with communication, there are Good Communication Strategies that we can all use in our day-to-day lives. Communication takes both parties working together for it to be successful. Tips as a Listener:   Control your environment. It is important to limit the amount of background noise in the room when possible. You should also consider having a good light source in the room to best see the other person. Ask for clarification. Instead of saying \"What?\", you can use parts of what you heard to make a new question. For example, if you heard the word \"Thursday\" but not the rest of the week, you may ask \"What was that about Thursday? \" or \"What did you want to do Thursday? \". This shows the person talking that you are listening and will help them better explain what they are saying. Be an advocate for yourself. If you are hearing but not understanding, tell the other person \"I can hear you, but I need you to slow down when you speak. \"  Or if someone is facing the other direction, say \"I cannot hear you when you are not looking at me when we talk. \"       Tips as a Talker:   - Sit or stand 3 to 6 feet away to maximize audibility         -- It is unrealistic to believe someone else will fully hear your message if you are speaking from across the room or in a different room in the house   - Stay at eye level to help with visual cues   - Make sure you have the persons attention before speaking   - Use facial expressions and gestures to accentuate your message   - Raise your voice slightly (do not scream)   - Speak slowly and distinctly   - Use short, simple sentences   - Rephrase your words if the person is having a hard time understanding you    - To avoid distortion, dont speak directly into a persons ear      Some additional items that may be helpful:   - Remain patient - this is important for both parties   - Write down items that still cannot be heard/understood. You may write with pen/paper or consider typing/texting on a cell phone or smart device. - If background noise is unavoidable, try to keep yourself in a good position in the room. By sitting at a banuelos on the side of the restaurant (preferably a corner), it will be easier to communicate than if you were sitting at a table in the middle with background noise surrounding you. Keep yourself positioned away from music speakers or heavy foot traffic.   - If you have difficulty with the television, consider these options:      -- Use closed-captioning, which is a setting you can turn on that displays the spoken words in a written form on the screen. There may be a slight delay, but this can help fill in missing information. This can be especially helpful when watching programs with accented speech. -- Consider use of a sound bar or speakers that come from the front of the TV. With modern flat screen TVs, many of them have speakers that come out of the back of the device, which makes sound bounce off the wall behind it, then go into the room. Sound bars can allow the sound to go straight in your direction and can improve sound quality. -- Consider ear level devices to help improve the volume and/or sound quality of the program.  There are devices that work like headphones that you can adjust the volume for your ears while others can have the volume at a more comfortable level, such as \"TV Ears\". Most hearing aids have devices that allow them to connect directly to the TV and improve sound quality. Tinnitus: Overview and Management Strategies          Many people have some ringing sounds in their ears once in a while. You may hear a roar, a hiss, a tinkle, or a buzz. The sound usually lasts only a few minutes.  If it goes on all the time, you may have tinnitus. Tinnitus is usually caused by long-term exposure to loud noise. This damages the nerves in the inner ear. It can occur with all types of hearing loss. It may be a symptom of almost any ear problem. Tinnitus may be caused by a buildup of earwax. Or, it may be caused by ear infections or certain medicines (especially antibiotics or large amounts of aspirin). You can also hear noises in your ears because of an injury to the ears, drinking too much alcohol or caffeine, or a medical condition. Other conditions may also contribute to tinnitus, including: head and neck trauma, temporomandibular joint disorder (TMJ), sinus pressure and barometric trauma, traumatic brain injury, metabolic disorders, autoimmune disorders, stress, and high blood pressure. You may need tests to evaluate your hearing and to find causes of long-lasting tinnitus. Your doctor may suggest one or more treatments to help you cope with the tinnitus. You can also do things at home to help reduce symptoms. Follow-up care is a key part of your treatment and safety. Be sure to make and go to all appointments, and call your doctor if you are having problems. It's also a good idea to know your test results and keep a list of the medicines you take. How can you care for yourself at home? Limit or cut out alcohol, caffeine, and sodium. They can make your symptoms worse. Do not smoke or use other tobacco products. Nicotine reduces blood flow to the ear and makes tinnitus worse. If you need help quitting, talk to your doctor about stop-smoking programs and medicines. These can increase your chances of quitting for good. Talk to your doctor about whether to stop taking aspirin and similar products such as ibuprofen or naproxen. Get exercise often. It can help improve blood flow to the ear. Ways to manage/cope with tinnitus  Some tinnitus may last a long time.  To manage your tinnitus, try to:  Avoid noises that you think caused your tinnitus. If you can't avoid loud noises, wear earplugs or earmuffs. Ignore the sound by paying attention to other things. Keeping your brain busy with other tasks or background noise can help your brain not focus on the tinnitus. Try to not give the tinnitus an emotional reaction. Do your best to ignore the sound and not let it bother you. Relax using biofeedback, meditation, or yoga. Feeling stressed and being tired can make tinnitus worse. Play music or white noise to help you sleep. Background noise may cover up the noise that you hear in your ears. You can buy a tabletop machine or a device that sits under your pillow to play soothing sounds, like ocean waves. Smart phones have free apps, such as Whist, Relax Melodies, ReSound Relief, and [employer]. These apps have different types of sounds/noise, some of which you can blend together to find sounds that are most soothing to you. Hearing aid technology, especially when there is some hearing loss, may help reduce tinnitus symptoms by giving your brain better access to the sounds it is missing. There are some hearing aids with built-in noise generator programs, which may help when amplification alone is not enough. Additional resources may be found through the [employer] at www.melinda.org    When should you call for help? Call 911 anytime you think you may need emergency care. For example, call if:    You have symptoms of a stroke. These may include:  Sudden numbness, tingling, weakness, or loss of movement in your face, arm, or leg, especially on only one side of your body. Sudden vision changes. Sudden trouble speaking. Sudden confusion or trouble understanding simple statements. Sudden problems with walking or balance. A sudden, severe headache that is different from past headaches. Call your doctor now or seek immediate medical care if:    You develop other symptoms. These may include hearing loss (or worse hearing loss), balance problems, dizziness, nausea, or vomiting. Watch closely for changes in your health, and be sure to contact your doctor if:    Your tinnitus moves from both ears to one ear. Your hearing loss gets worse within 1 day after an ear injury. Your tinnitus or hearing loss does not get better within 1 week after an ear injury. Your tinnitus bothers you enough that you want to take medicines to help you cope with it. If you notice changes in your tinnitus and/or your hearing, it is recommended that you have your hearing tested by your audiologist and to follow-up with your physician that manages your hearing loss (such as your ENT or Primary Care doctor). Hearing Loss: Care Instructions  Your Care Instructions      Hearing loss is a sudden or slow decrease in how well you hear. It can range from mild to profound. Permanent hearing loss can occur with aging, and it can happen when you are exposed long-term to loud noise. Examples include listening to loud music, riding motorcycles, or being around other loud machines. Hearing loss can affect your work and home life. It can make you feel lonely or depressed. You may feel that you have lost your independence. But hearing aids and other devices can help you hear better and feel connected to others. Follow-up care is a key part of your treatment and safety. Be sure to make and go to all appointments, and call your doctor if you are having problems. It's also a good idea to know your test results and keep a list of the medicines you take. How can you care for yourself at home? Avoid loud noises whenever possible. This helps keep your hearing from getting worse. Always wear hearing protection around loud noises. If appropriate, wear hearing aid(s) as directed.   It is recommended that hearing aids are worn during all waking hours to keep your brain active and give it access to the sounds it is missing. If you are beginning your process with hearing aid(s), schedule a \"Hearing Aid Evaluation\" with an audiologist to discuss your lifestyle, features of hearing aid technology, and styles of hearing aids available. It is recommended that you contact your insurance company to determine if you have a hearing aid benefit, as this may dictate who you can see for these services. Have hearing tests as your doctor suggests. They can show whether your hearing has changed. Your hearing aid may need to be adjusted. Use other assistive devices as needed. These may include:  Telephone amplifiers and hearing aids that can connect to a television, stereo, radio, or microphone. Devices that use lights or vibrations. These alert you to the doorbell, a ringing telephone, or a baby monitor. Television closed-captioning. This shows the words at the bottom of the screen. Most new TVs can do this. TTY (text telephone). This lets you type messages back and forth on the telephone instead of talking or listening. These devices are also called TDD. When messages are typed on the keyboard, they are sent over the phone line to a receiving TTY. The message is shown on a monitor. Use pagers, fax machines, text, and email if it is hard for you to communicate by telephone. Try to learn a listening technique called speech-reading. It is not lip-reading. You pay attention to people's gestures, expressions, posture, and tone of voice. These clues can help you understand what a person is saying. Face the person you are talking to, and have him or her face you. Make sure the lighting is good. You need to see the other person's face clearly. Think about counseling if you need help to adjust to your hearing loss. When should you call for help? Watch closely for changes in your health, and be sure to contact your doctor if:    You think your hearing is getting worse.      You have new symptoms, such as dizziness or nausea.

## 2022-08-02 NOTE — Clinical Note
Dr. Benny Palomares,  Please see note from this patient's audiogram.  Please let me know if there is anything further you need.    [id number] Audiologist

## 2022-08-02 NOTE — PROGRESS NOTES
[facility]  [address] [address] [facility], [address] [place] [address] [place]  P: [phone]  F: [phone]       Patient     Veda Chowdhury  1950    ChiefComplaint     Chief Complaint   Patient presents with    New Patient     Patient is here today because a couple of months ago she began to have lite headedness but as of today it is not as bad as before, she is also having ringing in her right ear, onset 20 years       History of Present Illness     Veda Chowdhury is a pleasant 67 y.o. female who presents as a new consultation referred by her primary care physician for dizziness, lightheadedness and left-sided tinnitus. Patient has a remote history of left ear labyrinthitis about 20 years ago. Has had tinnitus in the left ear since then. Over Memorial Day weekend, the patient was driving and being experienced a lightheaded sensation like she was going to pass out. She did not have any auditory symptoms such as ear fullness, hearing loss, motion sensation or spinning, worsening of tinnitus. She does have a history of migraine in the past.  She went to the emergency department and had an EKG performed which was normal.  She also had MRI of the brain, echocardiogram, carotid ultrasound. MRI of the brain images and report reviewed. No evidence of IAC lesion. Echocardiogram is essentially normal.  Carotid ultrasound revealed greater than 50% stenosis on the left. She is not taking any new medications. Sometimes her blood pressure does fluctuate. Since this event, she has had a couple more episodes of lightheaded feeling that lasts no more than a day. This occurs maybe once a week now. Denies a prior history of ear infections or ear surgeries. Denies a family history of ear problems. Audiogram performed the office today reveals right-sided normal hearing, left-sided normal to moderate high-frequency sensorineural hearing loss with a sharp dip at 8000 Hz. Type a tympanograms.   100% word recognition scores.     Past Medical History     Past Medical History:   Diagnosis Date    Acquired lymphedema of lower extremity     Esophageal reflux     Flushing     HTN (hypertension)     Idiopathic chronic venous hypertension of left lower extremity with inflammation     Labyrinthitis, unspecified     Lumbar disc disease     traumatic fall    Migraine     Pain in limb     left leg    Screening mammogram 02/29/2008    (Both)Negative       Past Surgical History     Past Surgical History:   Procedure Laterality Date    APPENDECTOMY      CHOLECYSTECTOMY      COLONOSCOPY  11/2014    negative - Dr Pete Fall  09/2019    Small sliding hiatal hernia       Family History     Family History   Problem Relation Age of Onset    Cancer Mother         skin, SCC       Social History     Social History     Socioeconomic History    Marital status: Single     Spouse name: Not on file    Number of children: Not on file    Years of education: Not on file    Highest education level: Not on file   Occupational History    Not on file   Tobacco Use    Smoking status: Never    Smokeless tobacco: Never   Substance and Sexual Activity    Alcohol use: No    Drug use: No    Sexual activity: Not on file   Other Topics Concern    Not on file   Social History Narrative    Not on file     Social Determinants of Health     Financial Resource Strain: Low Risk     Difficulty of Paying Living Expenses: Not hard at all   Food Insecurity: No Food Insecurity    Worried About Running Out of Food in the Last Year: Never true    Ran Out of Food in the Last Year: Never true   Transportation Needs: Not on file   Physical Activity: Not on file   Stress: Not on file   Social Connections: Not on file   Intimate Partner Violence: Not on file   Housing Stability: Not on file       Allergies     Allergies   Allergen Reactions    Ciprofloxacin      G.I. Upset       Medications     Current Outpatient Medications Medication Sig Dispense Refill    cholestyramine (QUESTRAN) 4 g packet USE ONE PACKET IN WATER 2 TIMES DAILY 180 packet 1    losartan (COZAAR) 100 MG tablet TAKE 1 TABLET BY MOUTH EVERY DAY 90 tablet 3    omeprazole (PRILOSEC) 40 MG delayed release capsule        No current facility-administered medications for this visit. Review of Systems     Review of Systems   Constitutional:  Negative for activity change, appetite change, chills, diaphoresis, fatigue, fever and unexpected weight change. HENT:  Positive for tinnitus. Negative for congestion, dental problem, drooling, ear discharge, ear pain, facial swelling, hearing loss, mouth sores, nosebleeds, postnasal drip, rhinorrhea, sinus pressure, sinus pain, sneezing, sore throat, trouble swallowing and voice change. Eyes:  Negative for photophobia, discharge, itching and visual disturbance. Respiratory:  Negative for cough, choking, shortness of breath, wheezing and stridor. Gastrointestinal:  Negative for blood in stool, constipation, diarrhea, nausea and vomiting. Endocrine: Negative for cold intolerance, heat intolerance, polyphagia and polyuria. Musculoskeletal:  Negative for back pain, gait problem, neck pain and neck stiffness. Skin:  Negative for color change, pallor, rash and wound. Neurological:  Positive for light-headedness. Negative for dizziness, syncope, facial asymmetry, speech difficulty, numbness and headaches. Hematological:  Negative for adenopathy. Does not bruise/bleed easily. Psychiatric/Behavioral:  Negative for agitation, confusion and sleep disturbance. PhysicalExam     Vitals:    08/02/22 0936   BP: (!) 168/78   Pulse: 69       Physical Exam  Constitutional:       Appearance: She is well-developed. HENT:      Head: Normocephalic and atraumatic. Not macrocephalic and not microcephalic. No raccoon eyes, Sorenson's sign, abrasion, contusion, right periorbital erythema, left periorbital erythema or laceration. Hair is normal.      Jaw: No trismus. Right Ear: Hearing, tympanic membrane and external ear normal. No decreased hearing noted. No drainage, swelling or tenderness. No middle ear effusion. No mastoid tenderness. Tympanic membrane is not perforated, retracted or bulging. Tympanic membrane has normal mobility. Left Ear: Hearing, tympanic membrane and external ear normal. No decreased hearing noted. No drainage, swelling or tenderness. No middle ear effusion. No mastoid tenderness. Tympanic membrane is not perforated, retracted or bulging. Tympanic membrane has normal mobility. Nose: No nasal deformity, septal deviation, laceration, mucosal edema or rhinorrhea. Right Sinus: No maxillary sinus tenderness or frontal sinus tenderness. Left Sinus: No maxillary sinus tenderness or frontal sinus tenderness. Mouth/Throat:      Mouth: Mucous membranes are not pale, not dry and not cyanotic. No lacerations or oral lesions. Dentition: Normal dentition. No dental caries or dental abscesses. Pharynx: Uvula midline. No oropharyngeal exudate, posterior oropharyngeal erythema or uvula swelling. Tonsils: No tonsillar abscesses. Eyes:      General: Lids are normal.         Right eye: No discharge. Left eye: No discharge. Extraocular Movements:      Right eye: Normal extraocular motion and no nystagmus. Left eye: Normal extraocular motion and no nystagmus. Conjunctiva/sclera:      Right eye: No chemosis or exudate. Left eye: No chemosis or exudate. Neck:      Thyroid: No thyroid mass or thyromegaly. Vascular: Normal carotid pulses. Trachea: No tracheal tenderness or tracheal deviation. Cardiovascular:      Rate and Rhythm: Normal rate and regular rhythm. Pulmonary:      Effort: No tachypnea, bradypnea or respiratory distress. Breath sounds: No stridor. Musculoskeletal:      Right shoulder: Normal range of motion.       Cervical back: Neck supple. Lymphadenopathy:      Head:      Right side of head: No submental, submandibular, tonsillar, preauricular, posterior auricular or occipital adenopathy. Left side of head: No submental, submandibular, tonsillar, preauricular, posterior auricular or occipital adenopathy. Cervical: No cervical adenopathy. Right cervical: No superficial, deep or posterior cervical adenopathy. Left cervical: No superficial, deep or posterior cervical adenopathy. Skin:     General: Skin is warm and dry. Findings: No bruising, erythema, laceration, lesion or rash. Neurological:      Mental Status: She is alert and oriented to person, place, and time. Cranial Nerves: No cranial nerve deficit. Comments: Functional neuro exam performed. [place]-Hallpike negative bilaterally. Finger-nose normal.  Cranial nerves II to XII grossly intact. Romberg normal.  Fukuda step test normal.     Psychiatric:         Speech: Speech normal.         Behavior: Behavior normal.   Worse. Procedure     Otomicroscopy    An operating microscope was utilized to visualize the external auditory canals using a 4mm speculum. The external auditory canals are clear. The tympanic membrane is intact. Ossicles appear intact. No fluid visualized in the middle ear. Assessment and Plan     1. Sensorineural hearing loss (SNHL) of left ear with unrestricted hearing of right ear  Audiogram reviewed with the patient today. Audiogram reveals a high-frequency left-sided asymmetric sensorineural hearing loss is moderate in nature. She has excellent word recognition scores. MRI of the brain reviewed. No IAC lesion noted. Symptomatology is not consistent with a peripheral vestibulopathy. Balance testing today is normal.  However, she does have a history of what appears to be a left labyrinthitis. This has left her with some residual tenderness.   I recommend a VNG to evaluate for any new peripheral vestibulopathy in the left ear.  This may help direct management. 2. Tinnitus of left ear      3. Asymmetrical hearing loss      4. Dizziness    - [facility], Marie Ruiz, VNG Testing, [place]    5. Light headed    - Marie Zee, VNG Testing, [place]    Return for vng.      Portions of this note were dictated using Dragon.  There may be linguistic errors secondary to the use of this program.

## 2022-08-03 ENCOUNTER — OFFICE VISIT (OUTPATIENT)
Dept: ORTHOPEDIC SURGERY | Age: 72
End: 2022-08-03
Payer: COMMERCIAL

## 2022-08-03 DIAGNOSIS — M17.11 PRIMARY OSTEOARTHRITIS OF RIGHT KNEE: Primary | ICD-10-CM

## 2022-08-03 PROCEDURE — 99999 PR OFFICE/OUTPT VISIT,PROCEDURE ONLY: CPT | Performed by: PHYSICIAN ASSISTANT

## 2022-08-03 PROCEDURE — 20611 DRAIN/INJ JOINT/BURSA W/US: CPT | Performed by: PHYSICIAN ASSISTANT

## 2022-08-03 RX ORDER — HYALURONATE SODIUM 10 MG/ML
20 SYRINGE (ML) INTRAARTICULAR ONCE
Status: COMPLETED | OUTPATIENT
Start: 2022-08-03 | End: 2022-08-03

## 2022-08-03 RX ADMIN — Medication 20 MG: at 09:35

## 2022-08-03 NOTE — PROGRESS NOTES
Diagnosis: Right knee osteoarthritis    Procedure: Right knee viscosupplementation #2    The patient returns today for their second Euflexxa injection in the right knee. The risks, benefits, and complications of the injections were again discussed in detail with the patient. The risks discussed included but are not limited to infection, skin reactions, hot swollen joints, and anaphylaxis. The patient gave verbal informed consent for the injection. The patient skin was prepped with 3 sterile gauze pads soaked with alcohol solution and the knee joint was injected with 2 mL of Euflexxa intra-articularly under sterile conditions . Technique: Under sterile conditions a [employer] ultrasound unit with a variable frequency (6.0-15.0 MHz) linear transducer was used to localize the placement of a 22-gauge needle into the wilfrido joint. Findings: Successful needle placement for intra-articular Visco supplementation injection. Final images were taken and saved for permanent record. The patient tolerated the injection reasonably well. The patient was given instructions to ice the the knee and avoid strenuous activities for 24-48 hours. The patient was instructed to call the office immediately if there is increased pain, redness, warmth, fever, or chills. We will see the patient back in one week for the third injection.

## 2022-08-10 ENCOUNTER — OFFICE VISIT (OUTPATIENT)
Dept: ORTHOPEDIC SURGERY | Age: 72
End: 2022-08-10
Payer: COMMERCIAL

## 2022-08-10 VITALS — HEIGHT: 67 IN | WEIGHT: 257 LBS | BODY MASS INDEX: 40.34 KG/M2

## 2022-08-10 DIAGNOSIS — M17.11 PRIMARY OSTEOARTHRITIS OF RIGHT KNEE: Primary | ICD-10-CM

## 2022-08-10 PROCEDURE — 20611 DRAIN/INJ JOINT/BURSA W/US: CPT | Performed by: PHYSICIAN ASSISTANT

## 2022-08-10 PROCEDURE — 99999 PR OFFICE/OUTPT VISIT,PROCEDURE ONLY: CPT | Performed by: PHYSICIAN ASSISTANT

## 2022-08-10 RX ORDER — HYALURONATE SODIUM 10 MG/ML
20 SYRINGE (ML) INTRAARTICULAR ONCE
Status: COMPLETED | OUTPATIENT
Start: 2022-08-10 | End: 2022-08-10

## 2022-08-10 RX ADMIN — Medication 20 MG: at 13:51

## 2022-08-10 NOTE — PROGRESS NOTES
Diagnosis: Right knee osteoarthritis    Procedure: Right knee viscosupplementation #3    The patient returns today for their third and final Euflexxa injection in the right knee. The risks, benefits, and complications of the injections were again discussed in detail with the patient. The risks discussed include but are not limited to infection, skin reactions, hot swollen joints, and anaphylaxis. The patient gave verbal informed consent for the injection. The patient's skin was prepped with 3 sterile gauze pads soaked with alcohol solution and the knee joint was injected with 2 mL of Euflexxa intra-articularly under sterile conditions. Technique: Under sterile conditions a [employer] ultrasound unit with a variable frequency (6.0-15.0 MHz) linear transducer was used to localize the placement of a 22-gauge needle into the knee joint. Findings: Successful needle placement for intra-articular Visco supplementation injection. Final images were taken and saved for permanent record. The patient tolerated the injection reasonably well. The patient was given instructions to ice of the knee and avoid strenuous activity for 24-48 hours. The patient was instructed to call the office immediately if there is increased pain, redness, warmth, fever, or chills. We will see the patient back on an as-needed basis  from this point.

## 2022-08-16 ENCOUNTER — TELEPHONE (OUTPATIENT)
Dept: ORTHOPEDIC SURGERY | Age: 72
End: 2022-08-16

## 2022-08-16 NOTE — TELEPHONE ENCOUNTER
General Question     Subject: PT STATES SHE HAD HER LAST GEL INJECTION LAST WEEK AND HER KNEE IS WORSE. SHE CANNOT BEAR WEIGHT, AND THE PAIN IS BEHIND HER KNEE. SHE WANTS TO KNOW WHAT SHE SHOULD DO?   Patient and /or Facility RequestSmireille Rascon  Contact Number: [phone]

## 2022-09-21 ENCOUNTER — OFFICE VISIT (OUTPATIENT)
Dept: ORTHOPEDIC SURGERY | Age: 72
End: 2022-09-21
Payer: COMMERCIAL

## 2022-09-21 VITALS — BODY MASS INDEX: 40.34 KG/M2 | WEIGHT: 257 LBS | HEIGHT: 67 IN

## 2022-09-21 DIAGNOSIS — M25.561 RIGHT KNEE PAIN, UNSPECIFIED CHRONICITY: ICD-10-CM

## 2022-09-21 DIAGNOSIS — M17.11 PRIMARY OSTEOARTHRITIS OF RIGHT KNEE: Primary | ICD-10-CM

## 2022-09-21 PROCEDURE — 20611 DRAIN/INJ JOINT/BURSA W/US: CPT | Performed by: PHYSICIAN ASSISTANT

## 2022-09-21 RX ORDER — TRIAMCINOLONE ACETONIDE 40 MG/ML
80 INJECTION, SUSPENSION INTRA-ARTICULAR; INTRAMUSCULAR ONCE
Status: COMPLETED | OUTPATIENT
Start: 2022-09-21 | End: 2022-09-21

## 2022-09-21 RX ORDER — LIDOCAINE HYDROCHLORIDE 10 MG/ML
40 INJECTION, SOLUTION INFILTRATION; PERINEURAL ONCE
Status: COMPLETED | OUTPATIENT
Start: 2022-09-21 | End: 2022-09-21

## 2022-09-21 RX ORDER — BUPIVACAINE HYDROCHLORIDE 2.5 MG/ML
10 INJECTION, SOLUTION INFILTRATION; PERINEURAL ONCE
Status: COMPLETED | OUTPATIENT
Start: 2022-09-21 | End: 2022-09-21

## 2022-09-21 RX ADMIN — BUPIVACAINE HYDROCHLORIDE 10 MG: 2.5 INJECTION, SOLUTION INFILTRATION; PERINEURAL at 14:06

## 2022-09-21 RX ADMIN — LIDOCAINE HYDROCHLORIDE 40 MG: 10 INJECTION, SOLUTION INFILTRATION; PERINEURAL at 14:07

## 2022-09-21 RX ADMIN — TRIAMCINOLONE ACETONIDE 80 MG: 40 INJECTION, SUSPENSION INTRA-ARTICULAR; INTRAMUSCULAR at 14:07

## 2022-09-21 NOTE — PROGRESS NOTES
Esophageal reflux     Flushing     HTN (hypertension)     Idiopathic chronic venous hypertension of left lower extremity with inflammation     Labyrinthitis, unspecified     Lumbar disc disease     traumatic fall    Migraine     Pain in limb     left leg    Screening mammogram 02/29/2008    (Both)Negative     Past Surgical History:   Procedure Laterality Date    APPENDECTOMY      CHOLECYSTECTOMY      COLONOSCOPY  11/2014    negative - Dr Javier Che ENDOSCOPY  09/2019    Small sliding hiatal hernia     Social History     Tobacco Use    Smoking status: Never    Smokeless tobacco: Never   Substance Use Topics    Alcohol use: No      Current Outpatient Medications on File Prior to Visit   Medication Sig Dispense Refill    losartan (COZAAR) 100 MG tablet TAKE 1 TABLET BY MOUTH EVERY DAY 90 tablet 3    cholestyramine (QUESTRAN) 4 g packet USE ONE PACKET IN WATER 2 TIMES DAILY 180 packet 1    omeprazole (PRILOSEC) 40 MG delayed release capsule        No current facility-administered medications on file prior to visit. ASCVD 10-YEAR RISK SCORE  The 10-year ASCVD risk score (Jennie Baer et al., 2013) is: 24.5%    Values used to calculate the score:      Age: 67 years      Sex: Female      Is Non- : No      Diabetic: No      Tobacco smoker: No      Systolic Blood Pressure: 533 mmHg      Is BP treated: Yes      HDL Cholesterol: 53 mg/dL      Total Cholesterol: 165 mg/dL     Review of Systems  10-point ROS is negative other than HPI. Physical Exam  Based off 1997 Exam Criteria  There were no vitals taken for this visit. Constitutional:       General: He is not in acute distress. Morbidly obese. Appearance: Normal appearance. Cardiovascular:      Rate and Rhythm: Normal rate and regular rhythm. Pulses: Normal pulses. Pulmonary:      Effort: Pulmonary effort is normal. No respiratory distress.    Neurological:      Mental Status: He is alert and oriented to person, place, and time. Mental status is at baseline. Musculoskeletal:  Gait:  antalgic  Alvin Hip: Pain-free range of motion of bilateral hips, negative impingement sign    R Knee: Point tenderness lateral joint line and medial joint line. Right knee has constitutional valgus alignment. She is wearing compression socks today. No skin findings. Mild global swelling present in the leg  Consistent with known lymphedema. Range of motion limited 10 to 80 degrees  L Knee:  Point tenderness lateral joint line and medial joint line. Right knee has constitutional valgus alignment. She is wearing compression socks today. No skin findings. Mild global swelling present in the leg  Consistent with known lymphedema. Range of motion limited 10 to 80 degrees. Imaging  Right Knee: [address] [facility]  Previous Radiographs: Valgus gonarthrosis, tricompartmental arthritis with joint space narrowing and osteophytes visible. Left knee has varus gonarthrosis      Procedure:  Orders Placed This Encounter   Procedures    US ARTHR/ASP/INJ MAJOR JNT/BURSA RIGHT     Standing Status:   Future     Standing Expiration Date:   10/21/2022     Order Specific Question:   Reason for exam:     Answer:   PAIN       I discussed in detail the risks, benefits and complications of aninjection which included but are not limited to infection, skin reactions, hot swollen joint, and anaphylaxis with the patient. The patient verbalized understanding and gave informed consent for the right knee injection. The patient is placed supine on table with a bolster underneath knee. Knee prepped with Betadine/Sterile alcohol solution. A sterile 22-gauge needle was inserted into the knee and the mixture of 2ml knealog 40mg/cc, 4 mL of 1% plain lidocaine, and 4 cc .25% bupivacaine was injected under sterile technique. The needle was withdrawn and the puncture site sealed with a Band-Aid.      Technique: Under sterile conditions a [employer] ultrasound unit with a variable frequency (6.0-15.0 MHz) linear transducer was used to localize the placement of a 22-gauge needle into the knee joint. Findings: Successful needle placement for knee injection. Final images were taken and saved for permanent record. The patient tolerated the injection well. The patient was instructed to call the office immediately if there is any pain, redness, warmth, fever, or chills. Assessment and Plan  Cat Suero was seen today for knee pain. Diagnoses and all orders for this visit:    Primary osteoarthritis of right knee  -     US ARTHR/ASP/INJ MAJOR JNT/BURSA RIGHT; Future  -     bupivacaine (MARCAINE) 0.25 % injection 10 mg  -     lidocaine 1 % injection 40 mg  -     triamcinolone acetonide (KENALOG-40) injection 80 mg    Right knee pain, unspecified chronicity  -     US ARTHR/ASP/INJ MAJOR JNT/BURSA RIGHT; Future  -     bupivacaine (MARCAINE) 0.25 % injection 10 mg  -     lidocaine 1 % injection 40 mg  -     triamcinolone acetonide (KENALOG-40) injection 80 mg       Patient has had more relief with cortisone then viscosupplementation injections. We will perform a right knee intra-articular cortisone injection with ultrasound guidance today. We will have the patient follow-up in 3 months or sooner if problems arise. I discussed with Cyril  that her history, symptoms, signs and imaging are most consistent with knee arthritis, morbid obesity. We discussed weight loss as potential treatment for arthritis as well as perioperative implications of weight. We reviewed the natural history of these conditions and treatment options ranging from conservative measures (rest, icing, activity modification, physical therapy, pain meds, cortisone injection) to surgical options. In terms of treatment, I recommended continuing with rest, icing, avoidance of painful activities, NSAIDs or pain meds as tolerated, and physical therapy.          We discussed surgical options as well, should conservative measures fail. Electronically signed by Js Pate PA-C on 9/21/2022 at 1:57 PM  This dictation was generated by voice recognition computer software. Although all attempts are made to edit the dictation for accuracy, there may be errors in the transcription that are not intended.

## 2022-09-28 SDOH — HEALTH STABILITY: PHYSICAL HEALTH: ON AVERAGE, HOW MANY DAYS PER WEEK DO YOU ENGAGE IN MODERATE TO STRENUOUS EXERCISE (LIKE A BRISK WALK)?: 0 DAYS

## 2022-09-28 SDOH — HEALTH STABILITY: PHYSICAL HEALTH: ON AVERAGE, HOW MANY MINUTES DO YOU ENGAGE IN EXERCISE AT THIS LEVEL?: 0 MIN

## 2022-09-29 ENCOUNTER — OFFICE VISIT (OUTPATIENT)
Dept: ORTHOPEDIC SURGERY | Age: 72
End: 2022-09-29
Payer: COMMERCIAL

## 2022-09-29 VITALS — WEIGHT: 257 LBS | HEIGHT: 67 IN | BODY MASS INDEX: 40.34 KG/M2

## 2022-09-29 DIAGNOSIS — M43.16 SPONDYLOLISTHESIS OF LUMBAR REGION: ICD-10-CM

## 2022-09-29 DIAGNOSIS — M54.50 LUMBAR PAIN: Primary | ICD-10-CM

## 2022-09-29 DIAGNOSIS — M51.36 DDD (DEGENERATIVE DISC DISEASE), LUMBAR: ICD-10-CM

## 2022-09-29 PROCEDURE — 1123F ACP DISCUSS/DSCN MKR DOCD: CPT | Performed by: PHYSICIAN ASSISTANT

## 2022-09-29 PROCEDURE — 99214 OFFICE O/P EST MOD 30 MIN: CPT | Performed by: PHYSICIAN ASSISTANT

## 2022-09-29 RX ORDER — MELOXICAM 15 MG/1
15 TABLET ORAL DAILY PRN
Qty: 30 TABLET | Refills: 1 | Status: SHIPPED | OUTPATIENT
Start: 2022-09-29

## 2022-09-29 NOTE — PROGRESS NOTES
New Patient: LUMBAR SPINE    Referring Provider: Jam Yuusf PA-C  CHIEF COMPLAINT:    Chief Complaint   Patient presents with    Back Pain     lumbar       HISTORY OF PRESENT ILLNESS:       Ms. Michelle Estrada  is a pleasant 67 y.o. female referred by Jam Yusuf PA-C here for consultation regarding her LBP and bilateral anterior thigh pain. Patient has a history of bilateral knee pain right greater than left and has been seen by Jam Yusuf who has given her cortisone injections and viscosupplementation in her right knee. She states that the cortisone injections have helped for a greater period of time than the viscosupplementation. She has had a concomitant low back pain with radiation into the right greater than left thigh. Pain is increased with any weightbearing activity and somewhat improved with sitting and lying. She ambulates with a forward flexed position. She states that her current back pain is 8/10 with radiation into anterior thighs of 8/10. She denies any paresthesias, progressive weakness, bowel or bladder dysfunction or saddle anesthesia. Patient has a history of an MRI followed by 3 spinal injections in the 1990s with some benefit.    Pain Assessment  Location of Pain: Back  Severity of Pain: 8  Quality of Pain: Other (Comment)  Duration of Pain: Other (Comment)  Frequency of Pain: Other (Comment)]    Current/Past Treatment:   Physical Therapy: None recently  Chiropractic: None   Injection: JAMAL x3 in the 1990s with benefit  Medications: Tylenol    Past Medical History:   Past Medical History:   Diagnosis Date    Acquired lymphedema of lower extremity     Esophageal reflux     Flushing     HTN (hypertension)     Idiopathic chronic venous hypertension of left lower extremity with inflammation     Labyrinthitis, unspecified     Lumbar disc disease     traumatic fall    Migraine     Pain in limb     left leg    Screening mammogram 02/29/2008    (Both)Negative        Past Surgical History: Past Surgical History:   Procedure Laterality Date    APPENDECTOMY      CHOLECYSTECTOMY      COLONOSCOPY  11/2014    negative - Dr Leah Camacho ENDOSCOPY  09/2019    Small sliding hiatal hernia       Current Medications:     Current Outpatient Medications:     losartan (COZAAR) 100 MG tablet, TAKE 1 TABLET BY MOUTH EVERY DAY, Disp: 90 tablet, Rfl: 3    cholestyramine (QUESTRAN) 4 g packet, USE ONE PACKET IN WATER 2 TIMES DAILY, Disp: 180 packet, Rfl: 1    omeprazole (PRILOSEC) 40 MG delayed release capsule, , Disp: , Rfl:     Allergies:  Ciprofloxacin    Social History:    reports that she has never smoked. She has never used smokeless tobacco. She reports that she does not drink alcohol and does not use drugs. Family History:   Family History   Problem Relation Age of Onset    Cancer Mother         skin, SCC       REVIEW OF SYSTEMS: Full ROS noted & scanned   CONSTITUTIONAL: Denies unexplained weight loss, fevers, chills or fatigue  NEUROLOGICAL: Denies unsteady gait or progressive weakness  MUSCULOSKELETAL: Denies joint swelling or redness  PSYCHOLOGICAL: Denies anxiety, depression   SKIN: Denies skin changes, delayed healing, rash, itching   HEMATOLOGIC: Denies easy bleeding or bruising  ENDOCRINE: Denies excessive thirst, urination, heat/cold  RESPIRATORY: Denies current dyspnea, cough  GI: Denies nausea, vomiting, diarrhea   : Denies bowel or bladder issues      PHYSICAL EXAM:    Vitals: Height 5' 7.01\" (1.702 m), weight 257 lb (116.6 kg), not currently breastfeeding. GENERAL EXAM:  General Apparence: Patient is adequately groomed with no evidence of malnutrition. Orientation: The patient is oriented to time, place and person. Mood & Affect:The patient's mood and affect are appropriate. Vascular: Examination reveals no swelling tenderness in upper or lower extremities. Good capillary refill.   Lymphatic: The lymphatic examination bilaterally reveals all areas to be without enlargement or induration  Sensation: Sensation is intact without deficit  Coordination/Balance: Good coordination. LUMBAR/SACRAL EXAMINATION:  Inspection: Local inspection shows no step-off or bruising. Lumbar alignment is normal.  Sagittal and Coronal balance is neutral.      Palpation: Diffuse tenderness at the midline. No tenderness bilaterally at the paraspinal or trochanters. There is no step-off or paraspinal spasm. Range of Motion: Lumbar flexion, extension and rotation are mildly limited due to pain. Strength:   Strength testing is 5/5 in all muscle groups tested. Special Tests:   Straight leg raise and crossed SLR negative. Leg length and pelvis level. Skin: There are no rashes, ulcerations or lesions. Reflexes: Reflexes are symmetrically 2+ at the patellar and ankle tendons. Clonus absent bilaterally at the feet. Gait & station: Patient ambulates with a forward flexed position without an assistive device    Additional Examinations:   RIGHT LOWER EXTREMITY: Inspection/examination of the right lower extremity does not show any tenderness, deformity or injury. Range of motion is unremarkable. There is no gross instability. There are no rashes, ulcerations or lesions. Strength and tone are normal.  LEFT LOWER EXTREMITY:  Inspection/examination of the left lower extremity does not show any tenderness, deformity or injury. Range of motion is unremarkable. There is no gross instability. There are no rashes, ulcerations or lesions. Strength and tone are normal.    Diagnostic Testing:    X-rays: 2 views of the lumbar spine include AP and lateral were obtained today in the office and independently reviewed with the patient which shows severe degenerative disc disease from L3-S1 with spondylolisthesis of L3 on L4 and L4 on L5. There is facet arthropathy noted.     Impression:   Spondylolisthesis L3-4 and L4-5  DDD lumbar spine  Probable spinal stenosis  Facet arthropathy  End-stage osteoarthritis bilateral knees    1. Lumbar pain        Plan:      We discussed the diagnosis and treatment options including observation, additional oral steroids, physical therapy, epidural injections and additional imaging. She wishes to proceed with MRI of the lumbar spine and physical therapy for lumbar stabilization, core strengthening exercises, and modalities of choice to include dry needling. She was also given a prescription for meloxicam 15 mg to be taken 1 p.o daily. She may continue with her Tylenol extra strength 2 every 6-8 hours as needed for pain. Follow up -after the MRI to review the results and to discuss treatment options. Old records were reviewed.     Total evaluation time 33 minutes    Krishan Warren PA-C  Board certified by the [address]

## 2022-10-08 ENCOUNTER — HOSPITAL ENCOUNTER (OUTPATIENT)
Dept: PHYSICAL THERAPY | Age: 72
Setting detail: THERAPIES SERIES
Discharge: HOME | End: 2022-10-08
Payer: COMMERCIAL

## 2022-10-08 PROCEDURE — 97110 THERAPEUTIC EXERCISES: CPT

## 2022-10-08 PROCEDURE — 97530 THERAPEUTIC ACTIVITIES: CPT

## 2022-10-08 PROCEDURE — 97161 PT EVAL LOW COMPLEX 20 MIN: CPT

## 2022-10-08 NOTE — PLAN OF CARE
[address] [address], [address]  Phone: [phone]   Fax: [phone]                                                       Physical Therapy Certification    Dear Chary Gomez,* date of referral to PT: 22,    We had the pleasure of evaluating the following patient for physical therapy services at [address] [place] [address]. A summary of our findings can be found in the initial assessment below. This includes our plan of care. If you have any questions or concerns regarding these findings, please do not hesitate to contact me at the office phone number checked above. Thank you for the referral.       Physician Signature:_______________________________Date:__________________  By signing above (or electronic signature), therapists plan is approved by physician      Patient: Killian Waite   : 1950   MRN: [id number]  Referring Physician: Chary Gomez,* date of referral to PT: 22      Evaluation Date: 10/8/2022      Medical Diagnosis Information:  Diagnosis: M51.36 (ICD-10-CM) - DDD (degenerative disc disease), lumbar  M43.16 (ICD-10-CM) - Spondylolisthesis of lumbar region   PT diagnosis: antalgia, flexed trunk, R>L knee pain contributing to assymmetrical gait, flexibility and trunk ROM deficits, strength deficits of trunk >LE mm. B knee flex contractures. Flexion of spine/trunk relieves back symptoms                                          Insurance information: PT Insurance Information: UMR ded met, no copay, no auth, # of visits TBD      Preferred Language for Healthcare:   [x]English       []Other:    C-SSRS Triggered by Intake questionnaire (Past 2 wk assessment ):   [x] No, Questionnaire did not trigger screening.   [] Yes, Patient intake triggered C-SSRS Screening     [] Completed, no further action required.    [] Completed, PCP notified via Epic    SUBJECTIVE: re-flared her back up 3/1/22  bc her R knee has been bothering her a lot -- has been limping bc her knee hurts a lot. The limping has flared up her back. Waiting on insurance to approve her MRI and then anticipates she will get a series of 3 shots into back. Has had the gel injections into her knee - did not help. Had cortisone injection into her knee - this helps. Previous PT tx in 1990 - did not help had some type of txn which increased symptoms     ONSET: 1990 - fell down the steps on [facility] and hurt her back     Fear avoidance: I should not do physical activities that (might) make my pain worse   [x] True   [] False     Current Level of Function: to walk has to lean forward. Walking limited to about 1 aisle in grocery store. Cannot stand for any length of time. Cannot kneel. Position changes flare her up. Stairs hurt. Prior Level of Function: Prior to this injury / incident, pt was independent with ADLs and IADLs    Living Status: alone. 17 steps to enter 2nd floor condo  Occupation/School:     PAIN:  Pain Scale: 5/10 back. R knee 2/10 - had cortisone shot on 9/21/22 which helped  Easing factors: leaning forward, sitting   Provocative factors: standing or walking tall, standing for any length of time, walking after prolonged sitting     Do you have any of the following? Numbness/tingling/change in sensation/strength? Sometimes gets tingling in R LE and  R LE sometimes feels weak. History of trauma?  [place] [address]  [place] [address] [facility] and hurt her back   Bowel /bladder symptoms? no  Changes in walking, balance, falls? no  Dizziness: NO /HAs - yes - sinus  Face symptoms/difficulty with speech/swallowing/change in taste/smell? No    FXNL SURVEY:  ZAIN score = 18, taken at initial eval    Precautions/ Contra-indications: HTN, Knee OA - R>L - hoping to avoid TKRs.   HAs, Lymphedema B LE- previous PT tx for it  - wears compression stockings, has lymphedema pumps she uses 3-4x/wk. Latex Allergy:  [x]NO      []YES    Relevant Medical History:    [x] Patient history, allergies, meds reviewed. Medical chart reviewed. See intake form. Review Of Systems (ROS):  [x]Performed Review of systems (Integumentary, CardioPulmonary, Neurological) by intake and observation. Intake form has been scanned into medical record. Patient has been instructed to contact their primary care physician regarding ROS issues if not already being addressed at this time.       Co-morbidities/Complexities (which will affect course of rehabilitation):  []None        []Hx of COVID   Arthritic conditions   []Rheumatoid arthritis (M05.9)  []Osteoarthritis (M19.91)  []Gout   Cardiovascular conditions   X Hypertension (I10)  []Hyperlipidemia (E78.5)  []Angina pectoris (I20)  []Atherosclerosis (I70)  []Pacemaker  []Hx of CABG/stent/  cardiac surgeries   Musculoskeletal conditions   []Disc pathology   []Congenital spine pathologies   []Osteoporosis (M81.8)  []Osteopenia (M85.8)  []Scoliosis       Endocrine conditions   []Hypothyroid (E03.9)  []Hyperthyroid Gastrointestinal conditions   []Constipation (U99.97)   Metabolic conditions   []Morbid obesity (E66.01)  []Diabetes type 1(E10.65) or 2 (E11.65)   []Neuropathy (G60.9)     Cardio/Pulmonary conditions   []Asthma (J45)  []Coughing   []COPD (J44.9)  []CHF  []A-fib   Psychological Disorders  [x]Anxiety (F41.9)  [x]Depression (F32.9)   []Other:   Developmental Disorders  []Autism (F84.0)  []CP (G80)  []Down Syndrome (Q90.9)  []Developmental delay     Neurological conditions  []Prior Stroke (I69.30)  []Parkinson's (G20)  []Encephalopathy (G93.40)  []MS (G35)  []Post-polio (G14)  []SCI  []TBI  []ALS Other conditions  []Fibromyalgia (M79.7)  []Vertigo  []Syncope  []Kidney Failure  []Cancer      []currently undergoing                treatment  []Pregnancy  []Incontinence   Prior surgeries  []involved limb  []previous spinal surgery  [] section birth  []hysterectomy  []bowel / bladder surgery  []other relevant surgeries   [x]Other:   HAs, Lymphedema B LE- previous PT tx for it  - wears compression stockings, has lymphedema pumps she uses 3-4x/wk. OBJECTIVE:     Balance Eyes open > 15 sec? Eyes closed > 15 sec?    Romberg  Yes []  No []  Yes []  No []   SLS  Yes []  No [x]  SLS:  R 2 sec  L 2 sec  Yes []  No []        Hautard's Test (UE proprioception): B shd flex 90 deg with supination, eyes closed with cerv rot or ext   > 15 sec > 15 sec   Head turned: L/R L: Yes []  No [] R: Yes []  No []   Head turned: up/down Up: Yes []  No [] Down: Yes []  No []     Gait: antalgic, flexed trunk and flexed hips    Stands \"upright\" in 17 deg of trunk flex and in R SB      Reflexes Grade Comments    C5-6 Biceps 1  0 = absent   C5-6 Brachioradialis 1  1 = diminished   C7-8 Triceps 1  2 = normal   S1-2 Seated achilles 1  3 = hyper reflexive   S1-2 Prone knee bend      L3-4 Patellar tendon 1     Clonus 1     Babinski      Strauss's (flick down)  Trommner's (flick up) WFL  + is flexion of thumb IP       Dermatomes Normal Abnormal Comments   inguinal area (L1)  x     anterior mid-thigh (L2) x     distal ant thigh/med knee (L3) x     Anterior knee, medial lower leg & great toe (L4) x     lateral lower leg & dorsal foot (L5) x     Posterior/lateral calf (S1) x     Posterior/medial calf and medial calcaneus (S2) x           ROM  Comments   Lumbar Flex 65  Stretches hamstrings   Lumbar Ext Lacks 5 deg from neutral  flex/ext      ROM LEFT RIGHT Comments   Lumbar SB 27 22 R SB in about 20 deg flex and L SB in about 30 deg flex   Ext and SB      Flex and SB      Thoracic/Lumbar Rotation   seated   Hip IR      Segmental mobility         Lower Quarter Special Tests Normal Abnormal Comments   Bong's sign      Trendelenburg      FB test  +R  R innom upslip   PSIS  R>L    Iliac crest  R>L    Repeated movements  Flex helps    Directional preference? FLEXION relieves symptoms    Painful arc?  ext    Aberrant motion?  ext      Segmental mobility L-P spine: R innom upslip, LEE sacral torsion, L1-5FRSL    Posture: flexed trunk    Palpation:   Myofascial pain/tenderness:  tender B L-P PS, but does not reproduce symptoms  Lying on hard mat table, even in hooklying causes increased pain     ROM RIGHT AROM LEFT AROM Comments   LE    [facility] WFL    Hip Flexion      Hip Abd      Hip ER      Hip IR      Hip Extension      Knee Ext Lacks 21 from full ext [facility] Seated EOB    In supine:  B knee flex contractures.    Knee Flex 107 WFL Seated EOB   DF      PF      Ankle INV      Ankle Ever            UE        Shoulder flex      Shoulder AB      Shoulder ER      Shoulder IR                        Joint mobility:    []Normal    [x]Hypo   []Hyper      Strength / Myotomes RIGHT LEFT   Multifidus     Transverse Ab     LE     Hip Flexors (L1-2) 4+ 5   Quads (L2-4) 4 5   Ankle Dorsiflexion (L4-5) 5 5   Great Toe Extension (L5) 5 5   Ankle Eversion (S1-2)     Ankle Plantarflexion (S1-2) 5 MMT 5 MMT   Hip Abductors     Hip Extensors     Hip Internal Rotators     Hip External Rotators     Hamstrings  4+ 5   Ankle Inversion               Strength / Myotomes RIGHT LEFT   Cervical Flexion (C1-2)     Cervical SB (C3)     Shoulder Shrug (C4)     Shoulder Abduction (C5)     Shoulder ER (C5)     Biceps (C6)     Triceps (C7)     Wrist Extension (C6)     Wrist Flexion (C7)      (C8)     Thumb Abduction (C8)     Finger Abduction (T1)     Shoulder Flex     Shoulder Scap     Shoulder IR                    SUPINE: & PRONE:    TA Muscle Contraction Scale    Criteria                                               Score  Quality of Contraction   Not Present      [] 0   Rapid, Superificial     [x] 1   Just Perceptible     [] 2     Gentle, Slow      [] 3    Substitution   Resting       [] 0   Moderate to Strong     [x] 1    Subtle Perceptible     [] 2   None       [] 3    Symmetry of Contraction   Unilateral       [] 0   Bilateral/Asymmetrical     [x] 1   Symmetrical       [] 2    Breathing     Inability/Difficulty Breathing during contraction [x] 0   Able to hold contraction while Breathing  [] 1    Holding   Able to Hold Contraction <10 s   [x] 0   Able to Hold Contraction >10 s   [] 1      _3_/10  Adapted from Linda ritchie, Copyright 2009    Neural dynamic tension testing Normal Abnormal Comments   Slump Test  - Degree of knee flexion:       SLR       0-40 x     40-70 x     Crossed SLR x     Femoral nerve (L2-4)   Prone knee flex:  R:  L:       Flexibility LEFT RIGHT Comments   Hip flexors Very stiff Very stiff    HS (90/90) Very stiff Very stiff    Glut max  Very stiff Very stiff    piriformis Very stiff Very stiff    gastroc            Upper trapezius                Lower Quarter Special Tests Normal Abnormal Comments   Heraclio test      NIALL/Darek MCINTYRE            Long axis distraction            Pelvic component:   Pt has a pelvic component if:   3 of 4 signs positive   OR   Positive Bong's sign and 2 of 4 other signs positive   Prone knee bend test      Supine to sit test      Assymmetry of iliac crests and PSIS?  x seated   FB Test  x          Sacral component:   Pt has a sacral component if:  3 of 5 signs positive   OR   Positive Bong's sign, SI distraction, and hip thrust   SI distraction      Hip thrust (shear)      [place]anesland's      S/L compression      Sacral Spring            PA/Spring                      Barriers to/and or personal factors that will affect rehab potential:              [x]Age  []Sex    []Smoker              [x]Motivation/Lack of Motivation                        [x]Co-Morbidities              []Cognitive Function, education/learning barriers              [x]Environmental, home barriers              []profession/work barriers  [x]past PT/medical experience  []other:  Justification:    Falls Risk Assessment (30 days):   [x] Falls Risk assessed and no intervention required. [] Falls Risk assessed and Patient requires intervention due to being higher risk   TUG score (>12s at risk):     [] Falls education provided, including         ASSESSMENT: Pt presents with antalgia, flexed trunk, R>L knee pain contributing to assymmetrical gait, flexibility and trunk ROM deficits, strength deficits of trunk >LE mm. B knee flex contractures. Flexion of spine/trunk relieves back symptoms  These deficits contribute to pain and reduced tolerance of functional activities. Recommend trial of aquatic therapy if pt willing/able. Pt will benefit from skilled PT services to restore PLOF.       Functional Impairments:  Lumbar/lower quarter:     [x]Noted lumbar/proximal hip hypomobility   []Noted lumbosacral and/or generalized hypermobility   []Decreased Lumbosacral/hip/LE functional ROM   [x]Decreased core/proximal hip strength and neuromuscular control    []Decreased LE functional strength    []Abnormal reflexes/sensation/myotomal/dermatomal deficits  []Reduced ability to run, hop, cut or jump  []Reduced balance/proprioceptive control    [x]other:  reduced functional ROM of B knees   []other:  reduce functional strength of    []other: myofascial changes and pain at    [x] Postural impairments: flexed and R SB at trunk, poor computer ergonomics  []other:          Functional Activity Limitations (from functional questionnaire and intake)   [x]Reduced ability to tolerate prolonged functional positions   [x]Reduced ability or difficulty with changes of positions or transfers between positions   [x]Reduced ability to maintain good posture and demonstrate good body mechanics with sitting, bending, and lifting   []Reduced ability to sleep   [] Reduced ability or tolerance with driving and/or computer work   [x]Reduced ability to perform lifting, reaching, carrying tasks   [x]Reduced ability to squat   [x]Reduced ability to forward bend   [x]Reduced ability to ambulate prolonged functional Evaluation Complexity Justification  [x] A history of present problem with:  [] no personal factors and/or comorbidities that impact the plan of care;  [x]1-2 personal factors and/or comorbidities that impact the plan of care  []3 personal factors and/or comorbidities that impact the plan of care  [x] An examination of body systems using standardized tests and measures addressing any of the following: body structures and functions (impairments), activity limitations, and/or participation restrictions;:  [x] a total of 1-2 or more elements   [] a total of 3 or more elements   [] a total of 4 or more elements   [x] A clinical presentation with:  [x] stable and/or uncomplicated characteristics   [] evolving clinical presentation with changing characteristics  [] unstable and unpredictable characteristics;   [x] Clinical decision making of [x] low, [] moderate, [] high complexity using standardized patient assessment instrument and/or measurable assessment of functional outcome. [x] EVAL (LOW) 46792 (typically 15 minutes face-to-face)  [] EVAL (MOD) 80376 (typically 30 minutes face-to-face)  [] EVAL (HIGH) 58853 (typically 45 minutes face-to-face)  [] RE-EVAL     HEP instruction: Written HEP instructions provided and reviewed    PLAN:  strength, ROM/flexibility, posture and body mechs, manual, MOC, HEP, pt education    Frequency/Duration:  2 days per week for   6 Weeks:  Interventions:  [x]  Therapeutic exercise including:strength, ROM, flexibility  [x]  NMR activation and proprioception including postural re-education  [x]  Manual therapy as indicated to include: IASTM, STM, PROM, Gr I-IV mobilizations, manipulation. [x]  Modalities as needed that may include: thermal agents, E-stim, Biofeedback, US, iontophoresis as indicated  [x]  Patient education on joint protection, postural re-education, activity modification, progression of HEP. Aquatic therapy    GOALS:  Patient stated goal:  less back pain.   Walk/ stand better  [] Progressing: [] Met: [] Not Met: [] Adjusted    Therapist goals for Patient:   Short Term Goals: To be achieved in: 2 weeks  1. Independent in HEP and progression per patient tolerance, in order to prevent re-injury. [] Progressing: [] Met: [] Not Met: [] Adjusted  2. Patient will have a decrease in pain to facilitate improvement in movement, function, and ADLs as indicated by improvement with respect to Functional Deficits. [] Progressing: [] Met: [] Not Met: [] Adjusted    Long Term Goals: To be achieved in: 6 weeks  1. ZAIN functional survey score of </= 20% disability  to assist with reaching prior level of function. [] Progressing: [] Met: [] Not Met: [] Adjusted  2. 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 [] Progressing: [] Met: [] Not Met: [] Adjusted  3. Patient will demonstrate increased Strength and core activation to allow for proper functional mobility as indicated by patients Functional Deficits to allow pt to resume up/down 17 steps to enter 2nd floor condo  without increase in symptoms. [] Progressing: [] Met: [] Not Met: [] Adjusted  4. Patient will return to functional activities including grocery shopping, sleep thru night , position changes, prolonged standing x 15+ min, prolonged sitting prn for working as  without increased symptoms or restriction.    [] Progressing: [] Met: [] Not Met: [] Adjusted    Electronically signed by:  Sandi Guerrero, PT DPT OMT-C

## 2022-10-08 NOTE — FLOWSHEET NOTE
[address] [facility] Physical Therapy  Phone: [phone]   Fax: [phone]    Physical Therapy Daily Treatment Note    Date:  10/08/2022     Patient Name:  Sherri Yanez    :  1950  MRN: [id number]  Medical Diagnosis:  DDD (degenerative disc disease), lumbar [M51.36]  Spondylolisthesis of lumbar region [M43.16]  Treatment Diagnosis: antalgia, flexed trunk, R>L knee pain contributing to assymmetrical gait, flexibility and trunk ROM deficits, strength deficits of trunk >LE mm. B knee flex contractures. Flexion of spine/trunk relieves back symptoms                                          Insurance/Certification information:  PT Insurance Information: UMR ded met, no copay, no auth, # of visits TBD  Physician Information:  Dilcia Chau,* date of referral to PT: 22  Plan of care signed (Y/N): []  Yes [x]  No     Date of Patient follow up with Physician:      Progress Report: [x]  Yes eval  [x]  No     Date Range for reporting period:  Beginning: 10/8/2022  Ending:     Progress report due (10 Rx/or 30 days whichever is less): visit #10 or       Recertification due (POC duration/ or 90 days whichever is less): visit #12 or 22 (date)     Visit # Insurance Allowable Auth required? Date Range    TBD []  Yes  [x]  No NA         Latex Allergy:  [x]NO      []YES  Preferred Language for Healthcare:   [x]English       []other:    Functional Scale:           Date assessed:  ZAIN score = 18/50         10/8/22    Pain level:  510     SUBJECTIVE:  See eval    OBJECTIVE: See eval      RESTRICTIONS/PRECAUTIONS:  HTN, Knee OA - R>L - hoping to avoid TKRs. HAs, Lymphedema B LE- previous PT tx for it  - wears compression stockings, has lymphedema pump she uses 3-4x/wk.       Exercises/Interventions:     Therapeutic Exercises (20037) Resistance / level Sets/sec Reps Notes   Sci fit L1  6 min S=22, arms = 7    IB       Step stretches  HS  Hip flex/knee flex Therapeutic Activities (25605)       Postural ed/computer ergonomics, take breaks - including short walking breaks 10'      Mini squats                     Gait (16006)       Gait trg Pt ed re role of RW prn to improve symmetry of spine during gait/WB                            Neuromuscular Re-ed (47666)       L-P Stability progression              Balance ex  SLR X 3 on airex                            Manual Intervention ([phone])       MET  As antionette                                           AquaticTherapy Dates of Service:   Aquatic Visits Exercises/Activities:   Transfers:  [x] Stairs   [] Ramp  [] Chair Lift   % Immersion:            Ambulation/ Warm up:   UE Exercises:       Forward   x Shoulder Shrugs      Lateral   x Shoulder Circles      Retro   x Scapular Retraction      [place]    Push Downs      Heel/Toe Walking    Punching       Rowing       Elbow Flex/Ext       Shldr Flex/Ext       [employer] aBd/aDd    LE Exercises:  Shldr Horiz aBd/aDd    HR/TR x Shldr IR/ER    Marches x Arm Circles    Squats  x PNF Diagonals    Hamstring Curls x Wall Push Ups    Hip Flexion (SLR) x     Hip aBduction (SLR) x     Hip Extension (SLR) x      Hip aDduction (SLR)      Hip Circles x Functional:    Hip IR/Er x Step up forward x   Hip Hikes  Step up lateral  x     Step down        Lunges Forward      Lunges Retro      Lunges Lateral     Balance:        SLS  x      Tandem Stance x      NBOS eyes open x Seated:     NBOS eyes closed x Ankle pumps  x   Hand to Opposite Knee x Ankle Circles     Fwd Step ups to SLS  Knee Flex/Ext x   Lateral Step ups to SLS  Hip aBd/aDd x   Stop/Go Gait   Bicycle  x     Ankle DF/PF      Ankle Inv/Ev    Stretching:       Gastroc/Soleus x     Hamstring  x Deep Water:    Knee Flex Stretch x Jog    Piriformis  x Noodle Hang    Hip Flexor x Traction at Wall    SKTC x     DKTC       ITB  Cool Down:    Quad  Fwd Walking    Mid Back   Lat Walking    UT  [facility] Shoulder  Noodle float    Ladder Pull      Pec Stretch            Core: Other:    TrA set x     Pelvic Tilts      Multifidi Walk outs c paddle x     PNF Chop/Lifts                          Aquatic Abbreviation Key  B= Belt DB= Dumbells T= Theratube   H= Hydrotone N= Noodles W= Weights   P= Paddles S= Speedo equipment K= Kickboard      Pt. Education: 10/8/22 Gave pt tour of pool, explained how to use lockers, what to wear, that it would be in group setting, and showed pt aquatic equipment. Modalities:     Pt. Education:  10/08/2022  -patient educated on diagnosis, prognosis and expectations for rehab  -all patient questions were answered  Pt ed re rationale and role of aquatics and OA at knees and back    Home Exercise Program:  Access Code: [email or web address]  URL: [email or web address]  Date: 10/08/2022  Prepared by: Maryam Needles    Exercises  Supine Transversus Abdominis Bracing - Hands on Stomach - 2 x daily - 7 x weekly - 1 sets - 10 reps - 5 hold  Hooklying Single Knee to Chest Stretch - 2 x daily - 7 x weekly - 1 sets - 3-5 reps - 15-30 hold  Seated Table Hamstring Stretch - 3 x daily - 7 x weekly - 1-2 sets - 3-5 reps - 15-30 hold  Seated Correct Posture - 2-3 x daily - 7 x weekly - 1-2 sets - 10 reps - 5-10 min hold  Walking - 2-4 x daily - 7 x weekly - 1-10 min hold        Therapeutic Exercise and NMR EXR  [x] (97242) Provided verbal/tactile cueing for activities related to strengthening, flexibility, endurance, ROM for improvements in  [] LE / Lumbar: LE, proximal hip, and core control with self care, mobility, lifting, ambulation. [] UE / Cervical: cervical, postural, scapular, scapulothoracic and UE control with self care, reaching, carrying, lifting, house/yardwork, driving, computer work.   [x] (15096) Provided verbal/tactile cueing for activities related to improving balance, coordination, kinesthetic sense, posture, motor skill, proprioception to assist with   [] LE / lumbar: LE, proximal hip, and core control in self care, mobility, lifting, ambulation and eccentric single leg control. [] UE / cervical: cervical, scapular, scapulothoracic and UE control with self care, reaching, carrying, lifting, house/yardwork, driving, computer work. [x] (69684) Therapist is in constant attendance of 2 or more patients providing skilled therapy interventions, but not providing any significant amount of measurable one-on-one time to either patient, for improvements in  [] LE / lumbar: LE, proximal hip, and core control in self care, mobility, lifting, ambulation and eccentric single leg control. [] UE / cervical: cervical, scapular, scapulothoracic and UE control with self care, reaching, carrying, lifting, house/yardwork, driving, computer work. NMR and Therapeutic Activities:    [x] (82276 or 35717) Provided verbal/tactile cueing for activities related to improving balance, coordination, kinesthetic sense, posture, motor skill, proprioception and motor activation to allow for proper function of   [] LE: / Lumbar core, proximal hip and LE with self care and ADLs  [] UE / Cervical: cervical, postural, scapular, scapulothoracic and UE control with self care, carrying, lifting, driving, computer work. [x] (31243) Gait Re-education- Provided training and instruction to the patient for proper LE, core and proximal hip recruitment and positioning and eccentric body weight control with ambulation re-education including up and down stairs     Home Management Training / Self Care:  [x] (58585) Provided self-care/home management training related to activities of daily living and compensatory training, and/or use of adaptive equipment for improvement with: ADLs and compensatory training, meal preparation, safety procedures and instruction in use of adaptive equipment, including bathing, grooming, dressing, personal hygiene, basic household cleaning and chores.      Home Exercise Program:    [x] (20008) Reviewed/Progressed HEP activities related to strengthening, flexibility, endurance, ROM of   [] LE / Lumbar: core, proximal hip and LE for functional self-care, mobility, lifting and ambulation/stair navigation   [] UE / Cervical: cervical, postural, scapular, scapulothoracic and UE control with self care, reaching, carrying, lifting, house/yardwork, driving, computer work  [x] (46329)Reviewed/Progressed HEP activities related to improving balance, coordination, kinesthetic sense, posture, motor skill, proprioception of   [] LE: core, proximal hip and LE for self care, mobility, lifting, and ambulation/stair navigation    [] UE / Cervical: cervical, postural,  scapular, scapulothoracic and UE control with self care, reaching, carrying, lifting, house/yardwork, driving, computer work    Manual Treatments:  PROM / STM / Oscillations-Mobs:  G-I, II, III, IV (PA's, Inf., Post.)  [x] (38156) Provided manual therapy to mobilize LE, proximal hip and/or LS spine soft tissue/joints for the purpose of modulating pain, promoting relaxation,  increasing ROM, reducing/eliminating soft tissue swelling/inflammation/restriction, improving soft tissue extensibility and allowing for proper ROM for normal function with   [] LE / lumbar: self care, mobility, lifting and ambulation. [] UE / Cervical: self care, reaching, carrying, lifting, house/yardwork, driving, computer work. Modalities:  [] (50433) Vasopneumatic compression: Utilized vasopneumatic compression to decrease edema / swelling for the purpose of improving mobility and quad tone / recruitment which will allow for increased overall function including but not limited to self-care, transfers, ambulation, and ascending / descending stairs.        Charges:  Timed Code Treatment Minutes: 40   Total Treatment Minutes: 60     [x] EVAL - LOW (57290)   [] EVAL - MOD (28554)  [] EVAL - HIGH (38762)  [] RE-EVAL (81290)  [x] SI(15206) x  1     [] Ionto  [] NMR (92191) x       [] Vaso  [] Manual (67365) x       [] Ultrasound  [x] TA x   2     [] Mech Traction (77935)  [] Aquatic Therapy x     [] ES (un) (66087):   [] Home Management Training x  [] ES(attended) (38981)   [] Dry Needling 1-2 muscles (78999):  [] Dry Needling 3+ muscles (874937)  [] Group:      [] Other:       GOALS:  Patient stated goal:  less back pain. Walk/ stand better  [] Progressing: [] Met: [] Not Met: [] Adjusted    Therapist goals for Patient:   Short Term Goals: To be achieved in: 2 weeks  1. Independent in HEP and progression per patient tolerance, in order to prevent re-injury. [] Progressing: [] Met: [] Not Met: [] Adjusted  2. Patient will have a decrease in pain to facilitate improvement in movement, function, and ADLs as indicated by improvement with respect to Functional Deficits. [] Progressing: [] Met: [] Not Met: [] Adjusted    Long Term Goals: To be achieved in: 6 weeks  1. ZAIN functional survey score of </= 20% disability  to assist with reaching prior level of function. [] Progressing: [] Met: [] Not Met: [] Adjusted  2. 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 [] Progressing: [] Met: [] Not Met: [] Adjusted  3. Patient will demonstrate increased Strength and core activation to allow for proper functional mobility as indicated by patients Functional Deficits to allow pt to resume up/down 17 steps to enter 2nd floor condo  without increase in symptoms. [] Progressing: [] Met: [] Not Met: [] Adjusted  4.  Patient will return to functional activities including grocery shopping, sleep thru night , position changes, prolonged standing x 15+ min, prolonged sitting prn for working as  without increased symptoms or restriction. [] Progressing: [] Met: [] Not Met: [] Adjusted      Overall Progression Towards Functional goals/ Treatment Progress Update:  [] Patient is progressing as expected towards functional goals listed. [] Progression is slowed due to complexities/Impairments listed. [] Progression has been slowed due to co-morbidities. [x] Plan just implemented, too soon to assess goals progression <30days   [] Goals require adjustment due to lack of progress  [] Patient is not progressing as expected and requires additional follow up with physician  [] Other    Persisting Functional Limitations/Impairments:  []Sleeping [x]Sitting               [x]Standing [x]Transfers        [x]Walking [x]Kneeling               [x]Stairs [x]Squatting / bending   [x]ADLs []Reaching  [x]Lifting  [x]Housework  []Driving [x]Job related tasks  [x]Sports/Recreation []Other:        ASSESSMENT:  See eval  Treatment/Activity Tolerance:  [x] Patient able to complete tx [] Patient limited by fatigue  [] Patient limited by pain  [] Patient limited by other medical complications  [] Other:     Prognosis: [x] Good [] Fair  [] Poor    Patient Requires Follow-up: [x] Yes  [] No    Plan for next treatment session: aquatics 1/wk, land 1x/wk. Normalize gait as able, flexion bias, posture, progres pt with aquatic program so ready to do aquatic HEP upon dc    PLAN:  strength, ROM/flexibility, posture and body mechs, manual, MOC, HEP, pt education    Frequency/Duration:  2 days per week for   6 Weeks:  Interventions:  [x]  Therapeutic exercise including:strength, ROM, flexibility  [x]  NMR activation and proprioception including postural re-education  [x]  Manual therapy as indicated to include: IASTM, STM, PROM, Gr I-IV mobilizations, manipulation.    [x]  Modalities as needed that may include: thermal agents, E-stim, Biofeedback, US, iontophoresis as indicated  [x]  Patient education on joint protection, postural re-education, activity modification, progression of HEP. Aquatic therapy    PLAN: See eval. PT 2x / week for 6 weeks. [] Continue per plan of care [] Alter current plan (see comments)  [x] Plan of care initiated [] Hold pending MD visit [] Discharge    Electronically signed by: Charles Romero, PT  DPT    Note: If patient does not return for scheduled/ recommended follow up visits, this note will serve as a discharge from care along with most recent update on progress.

## 2022-10-11 ENCOUNTER — TELEPHONE (OUTPATIENT)
Dept: ORTHOPEDIC SURGERY | Age: 72
End: 2022-10-11

## 2022-10-11 NOTE — TELEPHONE ENCOUNTER
General Question     Subject: PATIENT CHECKING ON INSURANCE APPROVAL FOR HER MRI.   Patient: Sheri Ferreira \"Laura\"  Contact Number: [phone]

## 2022-10-14 ENCOUNTER — HOSPITAL ENCOUNTER (OUTPATIENT)
Dept: PHYSICAL THERAPY | Age: 72
Setting detail: THERAPIES SERIES
Discharge: HOME | End: 2022-10-14
Payer: COMMERCIAL

## 2022-10-14 PROCEDURE — 97112 NEUROMUSCULAR REEDUCATION: CPT

## 2022-10-14 PROCEDURE — 97110 THERAPEUTIC EXERCISES: CPT

## 2022-10-14 NOTE — FLOWSHEET NOTE
[facility]  Phone: [phone]   Fax: [phone]    Physical Therapy Daily Treatment Note    Date:  10/14/2022     Patient Name:  Paul Greene    :  1950  MRN: [id number]  Medical Diagnosis:  DDD (degenerative disc disease), lumbar [M51.36]  Spondylolisthesis of lumbar region [M43.16]  Treatment Diagnosis: antalgia, flexed trunk, R>L knee pain contributing to assymmetrical gait, flexibility and trunk ROM deficits, strength deficits of trunk >LE mm. B knee flex contractures. Flexion of spine/trunk relieves back symptoms                                          Insurance/Certification information:  PT Insurance Information: UMR ded met, no copay, no auth, # of visits TBD  Physician Information:  Stephani Barclay,* date of referral to PT: 22  Plan of care signed (Y/N): [x]  Yes []  No     Date of Patient follow up with Physician:      Progress Report: []  Yes   [x]  No     Date Range for reporting period:  Beginning: 10/8/2022  Ending:     Progress report due (10 Rx/or 30 days whichever is less): visit #10 or 37/3/25      Recertification due (POC duration/ or 90 days whichever is less): visit #12 or 22 (date)     Visit # Insurance Allowable Auth required? Date Range    TBD []  Yes  [x]  No NA         Latex Allergy:  [x]NO      []YES  Preferred Language for Healthcare:   [x]English       []other:    Functional Scale:           Date assessed:  ZAIN score = 18/50         10/8/22    Pain level:  4/10     SUBJECTIVE:  no problems completing the HEP as prescribed. Has yet to schedule aquatic visit yet, but plans to; had a question if it was appropriate with her foot fungus    OBJECTIVE: See eval      RESTRICTIONS/PRECAUTIONS:  HTN, Knee OA - R>L - hoping to avoid TKRs. HAs, Lymphedema B LE- previous PT tx for it  - wears compression stockings, has lymphedema pump she uses 3-4x/wk.       Exercises/Interventions:     Therapeutic Exercises (42329) Resistance / level Sets/sec Reps Notes   Sci fit L1  5 min S=22, arms = 7    IB       Step stretches  HS  Hip flex/knee flex     2 x 20\" B  2 x 20\" B    Seated lumbar flex str   X 15    Standing QL with ball at wall   X 10                                Therapeutic Activities (28043)       Postural ed/computer ergonomics, take breaks - including short walking breaks 10'      Mini squats                     Gait (01422)       Gait trg     Patient does not want to use a walker at this time                        Neuromuscular Re-ed (44416)       L-P Stability progression       Partial squats with ball on wall   X 15    Balance ex  SLR X 3 on airex     X 10 each B    Hip gliders ext and abd   2 x 10 B each    AirEx in // bars  NBOS no hands, eyes closed  NBOS no hands with perturbations     3 x 10  X 30    SLS with hands on bars   3 x 10 sec holds B    Manual Intervention (20092)       MET  As antionette                                           AquaticTherapy Dates of Service:   Aquatic Visits Exercises/Activities:   Transfers:  [x] Stairs   [] Ramp  [] Chair Lift   % Immersion:            Ambulation/ Warm up:   UE Exercises:       Forward   x Shoulder Shrugs      Lateral   x Shoulder Circles      Retro   x Scapular Retraction      [place]    Push Downs      Heel/Toe Walking    Punching       Rowing       Elbow Flex/Ext       Shldr Flex/Ext       [employer] aBd/aDd    LE Exercises:  Shldr Horiz aBd/aDd    HR/TR x Shldr IR/ER    Marches x Arm Circles    Squats  x PNF Diagonals    Hamstring Curls x Wall Push Ups    Hip Flexion (SLR) x     Hip aBduction (SLR) x     Hip Extension (SLR) x      Hip aDduction (SLR)      Hip Circles x Functional:    Hip IR/Er x Step up forward x   Hip Hikes  Step up lateral  x     Step down        Lunges Forward      Lunges Retro      Lunges Lateral     Balance:        SLS  x      Tandem Stance x      NBOS eyes open x Seated:     NBOS eyes closed x Ankle pumps  x   Hand to Opposite Knee x Ankle Circles     Fwd Step ups to SLS  Knee Flex/Ext x   Lateral Step ups to SLS  Hip aBd/aDd x   Stop/Go Gait   Bicycle  x     Ankle DF/PF      Ankle Inv/Ev    Stretching:       Gastroc/Soleus x     Hamstring  x Deep Water:    Knee Flex Stretch x Jog    Piriformis  x Noodle Hang    Hip Flexor x Traction at Wall    SKTC x     DKTC       ITB  Cool Down:    Quad  Fwd Walking    Mid Back   Lat Walking    UT  Retro Walking    Post Shoulder  Noodle float    Ladder Pull      Pec Stretch            Core: Other:    TrA set x     Pelvic Tilts      Multifidi Walk outs c paddle x     PNF Chop/Lifts                          Aquatic Abbreviation Key  B= Belt DB= Dumbells T= Theratube   H= Hydrotone N= Noodles W= Weights   P= Paddles S= Speedo equipment K= Kickboard      Pt. Education: 10/8/22 Gave pt tour of pool, explained how to use lockers, what to wear, that it would be in group setting, and showed pt aquatic equipment. Modalities:     Pt. Education:  10/08/2022  -patient educated on diagnosis, prognosis and expectations for rehab  -all patient questions were answered  Pt ed re rationale and role of aquatics and OA at knees and back    Home Exercise Program:  Access Code: [email or web address]  URL: [email or web address]  Date: 10/08/2022  Prepared by: Malcom Jain    Exercises  Supine Transversus Abdominis Bracing - Hands on Stomach - 2 x daily - 7 x weekly - 1 sets - 10 reps - 5 hold  Hooklying Single Knee to Chest Stretch - 2 x daily - 7 x weekly - 1 sets - 3-5 reps - 15-30 hold  Seated Table Hamstring Stretch - 3 x daily - 7 x weekly - 1-2 sets - 3-5 reps - 15-30 hold  Seated Correct Posture - 2-3 x daily - 7 x weekly - 1-2 sets - 10 reps - 5-10 min hold  Walking - 2-4 x daily - 7 x weekly - 1-10 min hold        Therapeutic Exercise and NMR EXR  [x] (01402) Provided verbal/tactile cueing for activities related to strengthening, flexibility, endurance, ROM for improvements in  [] LE / Lumbar: LE, proximal hip, and core control with self care, mobility, lifting, ambulation. [] UE / Cervical: cervical, postural, scapular, scapulothoracic and UE control with self care, reaching, carrying, lifting, house/yardwork, driving, computer work. [x] (99034) Provided verbal/tactile cueing for activities related to improving balance, coordination, kinesthetic sense, posture, motor skill, proprioception to assist with   [] LE / lumbar: LE, proximal hip, and core control in self care, mobility, lifting, ambulation and eccentric single leg control. [] UE / cervical: cervical, scapular, scapulothoracic and UE control with self care, reaching, carrying, lifting, house/yardwork, driving, computer work. [x] (44987) Therapist is in constant attendance of 2 or more patients providing skilled therapy interventions, but not providing any significant amount of measurable one-on-one time to either patient, for improvements in  [] LE / lumbar: LE, proximal hip, and core control in self care, mobility, lifting, ambulation and eccentric single leg control. [] UE / cervical: cervical, scapular, scapulothoracic and UE control with self care, reaching, carrying, lifting, house/yardwork, driving, computer work. NMR and Therapeutic Activities:    [x] (52241 or 87796) Provided verbal/tactile cueing for activities related to improving balance, coordination, kinesthetic sense, posture, motor skill, proprioception and motor activation to allow for proper function of   [] LE: / Lumbar core, proximal hip and LE with self care and ADLs  [] UE / Cervical: cervical, postural, scapular, scapulothoracic and UE control with self care, carrying, lifting, driving, computer work.    [x] (22956) Gait Re-education- Provided training and instruction to the patient for proper LE, core and proximal hip recruitment and positioning and eccentric body weight control with ambulation re-education including up and down stairs     Home Management Training / Self Care:  [x] (15193) Provided self-care/home management training related to activities of daily living and compensatory training, and/or use of adaptive equipment for improvement with: ADLs and compensatory training, meal preparation, safety procedures and instruction in use of adaptive equipment, including bathing, grooming, dressing, personal hygiene, basic household cleaning and chores. Home Exercise Program:    [x] (83741) Reviewed/Progressed HEP activities related to strengthening, flexibility, endurance, ROM of   [] LE / Lumbar: core, proximal hip and LE for functional self-care, mobility, lifting and ambulation/stair navigation   [] UE / Cervical: cervical, postural, scapular, scapulothoracic and UE control with self care, reaching, carrying, lifting, house/yardwork, driving, computer work  [x] (53830)Reviewed/Progressed HEP activities related to improving balance, coordination, kinesthetic sense, posture, motor skill, proprioception of   [] LE: core, proximal hip and LE for self care, mobility, lifting, and ambulation/stair navigation    [] UE / Cervical: cervical, postural,  scapular, scapulothoracic and UE control with self care, reaching, carrying, lifting, house/yardwork, driving, computer work    Manual Treatments:  PROM / STM / Oscillations-Mobs:  G-I, II, III, IV (PA's, Inf., Post.)  [x] (18977) Provided manual therapy to mobilize LE, proximal hip and/or LS spine soft tissue/joints for the purpose of modulating pain, promoting relaxation,  increasing ROM, reducing/eliminating soft tissue swelling/inflammation/restriction, improving soft tissue extensibility and allowing for proper ROM for normal function with   [] LE / lumbar: self care, mobility, lifting and ambulation. [] UE / Cervical: self care, reaching, carrying, lifting, house/yardwork, driving, computer work.      Modalities:  [] (14637) Vasopneumatic compression: Utilized vasopneumatic compression to decrease edema / swelling for the purpose of improving mobility and quad tone / recruitment which will allow for increased overall function including but not limited to self-care, transfers, ambulation, and ascending / descending stairs. Charges:  Timed Code Treatment Minutes: 46   Total Treatment Minutes: 46     [] EVAL - LOW (95424)   [] EVAL - MOD (43937)  [] EVAL - HIGH (15781)  [] RE-EVAL (75558)  [x] ROEL(73460) x  1     [] Ionto  [x] NMR (13837) x  2     [] Vaso  [] Manual (79337) x       [] Ultrasound  [] TA x        [] Mech Traction (23648)  [] Aquatic Therapy x     [] ES (un) (21677):   [] Home Management Training x  [] ES(attended) (66947)   [] Dry Needling 1-2 muscles (84697):  [] Dry Needling 3+ muscles (068520)  [] Group:      [] Other:       GOALS:  Patient stated goal:  less back pain. Walk/ stand better  [] Progressing: [] Met: [] Not Met: [] Adjusted    Therapist goals for Patient:   Short Term Goals: To be achieved in: 2 weeks  1. Independent in HEP and progression per patient tolerance, in order to prevent re-injury. [] Progressing: [] Met: [] Not Met: [] Adjusted  2. Patient will have a decrease in pain to facilitate improvement in movement, function, and ADLs as indicated by improvement with respect to Functional Deficits. [] Progressing: [] Met: [] Not Met: [] Adjusted    Long Term Goals: To be achieved in: 6 weeks  1. ZAIN functional survey score of </= 20% disability  to assist with reaching prior level of function. [] Progressing: [] Met: [] Not Met: [] Adjusted  2. 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 [] Progressing: [] Met: [] Not Met: [] Adjusted  3.  Patient will demonstrate increased Strength and core activation to allow for proper functional mobility as indicated by patients Functional Deficits to allow pt to resume up/down 17 steps to enter 2nd floor condo  without increase in symptoms. [] Progressing: [] Met: [] Not Met: [] Adjusted  4. Patient will return to functional activities including grocery shopping, sleep thru night , position changes, prolonged standing x 15+ min, prolonged sitting prn for working as  without increased symptoms or restriction. [] Progressing: [] Met: [] Not Met: [] Adjusted      Overall Progression Towards Functional goals/ Treatment Progress Update:  [] Patient is progressing as expected towards functional goals listed. [] Progression is slowed due to complexities/Impairments listed. [] Progression has been slowed due to co-morbidities. [x] Plan just implemented, too soon to assess goals progression <30days   [] Goals require adjustment due to lack of progress  [] Patient is not progressing as expected and requires additional follow up with physician  [] Other    Persisting Functional Limitations/Impairments:  []Sleeping [x]Sitting               [x]Standing [x]Transfers        [x]Walking [x]Kneeling               [x]Stairs [x]Squatting / bending   [x]ADLs []Reaching  [x]Lifting  [x]Housework  []Driving [x]Job related tasks  [x]Sports/Recreation []Other:        ASSESSMENT:   Treatment/Activity Tolerance:  [x] Patient able to complete tx [] Patient limited by fatigue  [] Patient limited by pain  [] Patient limited by other medical complications  [] Other:     Prognosis: [x] Good [] Fair  [] Poor    Patient Requires Follow-up: [x] Yes  [] No    Plan for next treatment session: aquatics 1/wk, land 1x/wk.  Normalize gait as able, flexion bias, posture, progres pt with aquatic program so ready to do aquatic HEP upon dc    PLAN:  strength, ROM/flexibility, posture and body mechs, manual, MOC, HEP, pt education    Frequency/Duration:  2 days per week for   6 Weeks:  Interventions:  [x]  Therapeutic exercise including:strength, ROM, flexibility  [x]  NMR activation and proprioception including postural re-education  [x]  Manual therapy as indicated to include: IASTM, STM, PROM, Gr I-IV mobilizations, manipulation. [x]  Modalities as needed that may include: thermal agents, E-stim, Biofeedback, US, iontophoresis as indicated  [x]  Patient education on joint protection, postural re-education, activity modification, progression of HEP. Aquatic therapy    PLAN: See eval. PT 2x / week for 6 weeks. [x] Continue per plan of care [] Alter current plan (see comments)  [] Plan of care initiated [] Hold pending MD visit [] Discharge    Electronically signed by: Benito Blake, PT  DPT    Note: If patient does not return for scheduled/ recommended follow up visits, this note will serve as a discharge from care along with most recent update on progress.

## 2022-10-19 ENCOUNTER — HOSPITAL ENCOUNTER (OUTPATIENT)
Dept: PHYSICAL THERAPY | Age: 72
Setting detail: THERAPIES SERIES
Discharge: HOME | End: 2022-10-19
Payer: COMMERCIAL

## 2022-10-19 PROCEDURE — 97110 THERAPEUTIC EXERCISES: CPT

## 2022-10-19 PROCEDURE — 97112 NEUROMUSCULAR REEDUCATION: CPT

## 2022-10-19 NOTE — FLOWSHEET NOTE
[address] [facility] Physical Therapy  Phone: [phone]   Fax: [phone]    Physical Therapy Daily Treatment Note    Date:  10/19/2022     Patient Name:  Cristela Montiel    :  1950  MRN: [id number]  Medical Diagnosis:  DDD (degenerative disc disease), lumbar [M51.36]  Spondylolisthesis of lumbar region [M43.16]  Treatment Diagnosis: antalgia, flexed trunk, R>L knee pain contributing to assymmetrical gait, flexibility and trunk ROM deficits, strength deficits of trunk >LE mm. B knee flex contractures. Flexion of spine/trunk relieves back symptoms                                          Insurance/Certification information:  PT Insurance Information: UMR ded met, no copay, no auth, # of visits TBD  Physician Information:  Early Lacks,* date of referral to PT: 22  Plan of care signed (Y/N): [x]  Yes []  No     Date of Patient follow up with Physician:      Progress Report: []  Yes   [x]  No     Date Range for reporting period:  Beginning: 10/8/2022  Ending:     Progress report due (10 Rx/or 30 days whichever is less): visit #10 or 87      Recertification due (POC duration/ or 90 days whichever is less): visit #12 or 22 (date)     Visit # Insurance Allowable Auth required? Date Range   3/12 TBD []  Yes  [x]  No NA         Latex Allergy:  [x]NO      []YES  Preferred Language for Healthcare:   [x]English       []other:    Functional Scale:           Date assessed:  ZAIN score = 18/50         10/8/22    Pain level:  4/10     SUBJECTIVE:  plans to try aquatics next week. Pain level pretty steady. Feels tired today from a longer work day. OBJECTIVE: 10/19: into clinic without AD with moderate antalgia; moderately flexed posture with noted glut med weakness when in SLS    RESTRICTIONS/PRECAUTIONS:  HTN, Knee OA - R>L - hoping to avoid TKRs.   HAs, Lymphedema B LE- previous PT tx for it  - wears compression stockings, has lymphedema pump she uses 3-4x/wk. Exercises/Interventions:     Therapeutic Exercises (33161) Resistance / level Sets/sec Reps Notes   Sci fit L1  6 min S=22, arms = 7    IB       Step stretches  HS  Hip flex/knee flex     2 x 20\" B  2 x 20\" B    Seated lumbar flex str with ball blue  X 15 with     Standing QL with ball at wall   X 10 B    Standing ext in // bars   X 15    Scap retraction in standing   X 15                  Therapeutic Activities (18576)       Postural ed/computer ergonomics, take breaks - including short walking breaks                           Gait (33484)       Gait trg     Patient does not want to use a walker at this time                        Neuromuscular Re-ed (97760)       Seated SB--AP, ML, circles each direction blue  X 10 each    Partial squats with ball on wall   X 15    Balance ex  SLR X 3 on airex     X 10 each B    Hip gliders ext and abd   2 x 10 B each    AirEx in // bars  NBOS no hands, eyes closed  NBOS no hands with perturbations     3 x 10  X 30    SLS with hands on bars (on ground)   3 x 10 sec holds B    Manual Intervention (42833)       MET  As antionette                                           AquaticTherapy Dates of Service:   Aquatic Visits Exercises/Activities:   Transfers:  [x] Stairs   [] Ramp  [] Chair Lift   % Immersion:            Ambulation/ Warm up:   UE Exercises:       Forward   x Shoulder Shrugs      Lateral   x Shoulder Circles      Retro   x Scapular Retraction      [place]    Push Downs      Heel/Toe Walking    Punching       Rowing       Elbow Flex/Ext       Shldr Flex/Ext       [employer] aBd/aDd    LE Exercises:  Shldr Horiz aBd/aDd    HR/TR x Shldr IR/ER    Marches x Arm Circles    Squats  x PNF Diagonals    Hamstring Curls x Wall Push Ups    Hip Flexion (SLR) x     Hip aBduction (SLR) x     Hip Extension (SLR) x      Hip aDduction (SLR)      Hip Circles x Functional:    Hip IR/Er x Step up forward x   Hip Hikes  Step up lateral  x     Step down        Lunges Forward      Lunges Retro      Lunges Lateral     Balance:        SLS  x      Tandem Stance x      NBOS eyes open x Seated:     NBOS eyes closed x Ankle pumps  x   Hand to Opposite Knee x Ankle Circles     Fwd Step ups to SLS  Knee Flex/Ext x   Lateral Step ups to SLS  Hip aBd/aDd x   Stop/Go Gait   Bicycle  x     Ankle DF/PF      Ankle Inv/Ev    Stretching:       Gastroc/Soleus x     Hamstring  x Deep Water:    Knee Flex Stretch x Jog    Piriformis  x Noodle Hang    Hip Flexor x Traction at Wall    SKTC x     DKTC       ITB  Cool Down:    Quad  Fwd Walking    Mid Back   Lat Walking    UT  Retro Walking    Post Shoulder  Noodle float    Ladder Pull      Pec Stretch            Core: Other:    TrA set x     Pelvic Tilts      Multifidi Walk outs c paddle x     PNF Chop/Lifts                          Aquatic Abbreviation Key  B= Belt DB= Dumbells T= Theratube   H= Hydrotone N= Noodles W= Weights   P= Paddles S= Speedo equipment K= Kickboard      Pt. Education: 10/8/22 Gave pt tour of pool, explained how to use lockers, what to wear, that it would be in group setting, and showed pt aquatic equipment. Modalities:     Pt. Education:  10/08/2022  -patient educated on diagnosis, prognosis and expectations for rehab  -all patient questions were answered  Pt ed re rationale and role of aquatics and OA at knees and back    Home Exercise Program:  Access Code: [email or web address]  URL: [email or web address]  Date: 10/08/2022  Prepared by: Oziel Padron    Exercises  Supine Transversus Abdominis Bracing - Hands on Stomach - 2 x daily - 7 x weekly - 1 sets - 10 reps - 5 hold  Hooklying Single Knee to Chest Stretch - 2 x daily - 7 x weekly - 1 sets - 3-5 reps - 15-30 hold  Seated Table Hamstring Stretch - 3 x daily - 7 x weekly - 1-2 sets - 3-5 reps - 15-30 hold  Seated Correct Posture - 2-3 x daily - 7 x weekly - 1-2 sets - 10 reps - 5-10 min hold  Walking - 2-4 x daily - 7 x weekly - 1-10 min hold        Therapeutic Exercise and NMR EXR  [x] (22484) Provided verbal/tactile cueing for activities related to strengthening, flexibility, endurance, ROM for improvements in  [] LE / Lumbar: LE, proximal hip, and core control with self care, mobility, lifting, ambulation. [] UE / Cervical: cervical, postural, scapular, scapulothoracic and UE control with self care, reaching, carrying, lifting, house/yardwork, driving, computer work. [x] (50550) Provided verbal/tactile cueing for activities related to improving balance, coordination, kinesthetic sense, posture, motor skill, proprioception to assist with   [] LE / lumbar: LE, proximal hip, and core control in self care, mobility, lifting, ambulation and eccentric single leg control. [] UE / cervical: cervical, scapular, scapulothoracic and UE control with self care, reaching, carrying, lifting, house/yardwork, driving, computer work. [x] (50932) Therapist is in constant attendance of 2 or more patients providing skilled therapy interventions, but not providing any significant amount of measurable one-on-one time to either patient, for improvements in  [] LE / lumbar: LE, proximal hip, and core control in self care, mobility, lifting, ambulation and eccentric single leg control. [] UE / cervical: cervical, scapular, scapulothoracic and UE control with self care, reaching, carrying, lifting, house/yardwork, driving, computer work. NMR and Therapeutic Activities:    [x] (55278 or 91490) Provided verbal/tactile cueing for activities related to improving balance, coordination, kinesthetic sense, posture, motor skill, proprioception and motor activation to allow for proper function of   [] LE: / Lumbar core, proximal hip and LE with self care and ADLs  [] UE / Cervical: cervical, postural, scapular, scapulothoracic and UE control with self care, carrying, lifting, driving, computer work.    [x] (30610) Gait Re-education- Provided training and instruction to the patient for proper LE, core and proximal hip recruitment and positioning and eccentric body weight control with ambulation re-education including up and down stairs     Home Management Training / Self Care:  [x] (43590) Provided self-care/home management training related to activities of daily living and compensatory training, and/or use of adaptive equipment for improvement with: ADLs and compensatory training, meal preparation, safety procedures and instruction in use of adaptive equipment, including bathing, grooming, dressing, personal hygiene, basic household cleaning and chores. Home Exercise Program:    [x] (40361) Reviewed/Progressed HEP activities related to strengthening, flexibility, endurance, ROM of   [] LE / Lumbar: core, proximal hip and LE for functional self-care, mobility, lifting and ambulation/stair navigation   [] UE / Cervical: cervical, postural, scapular, scapulothoracic and UE control with self care, reaching, carrying, lifting, house/yardwork, driving, computer work  [x] (65336)Reviewed/Progressed HEP activities related to improving balance, coordination, kinesthetic sense, posture, motor skill, proprioception of   [] LE: core, proximal hip and LE for self care, mobility, lifting, and ambulation/stair navigation    [] UE / Cervical: cervical, postural,  scapular, scapulothoracic and UE control with self care, reaching, carrying, lifting, house/yardwork, driving, computer work    Manual Treatments:  PROM / STM / Oscillations-Mobs:  G-I, II, III, IV (PA's, Inf., Post.)  [x] (78523) Provided manual therapy to mobilize LE, proximal hip and/or LS spine soft tissue/joints for the purpose of modulating pain, promoting relaxation,  increasing ROM, reducing/eliminating soft tissue swelling/inflammation/restriction, improving soft tissue extensibility and allowing for proper ROM for normal function with   [] LE / lumbar: self care, mobility, lifting and ambulation.     [] UE / Cervical: self care, reaching, carrying, lifting, house/yardwork, driving, computer work. Modalities:  [] (65390) Vasopneumatic compression: Utilized vasopneumatic compression to decrease edema / swelling for the purpose of improving mobility and quad tone / recruitment which will allow for increased overall function including but not limited to self-care, transfers, ambulation, and ascending / descending stairs. Charges:  Timed Code Treatment Minutes: 45   Total Treatment Minutes: 45     [] EVAL - LOW (20859)   [] EVAL - MOD (67218)  [] EVAL - HIGH (38888)  [] RE-EVAL (20648)  [x] UD(95568) x  1     [] Ionto  [x] NMR (84647) x  2     [] Vaso  [] Manual (31827) x       [] Ultrasound  [] TA x        [] Mech Traction (78564)  [] Aquatic Therapy x     [] ES (un) (75972):   [] Home Management Training x  [] ES(attended) (44023)   [] Dry Needling 1-2 muscles (50605):  [] Dry Needling 3+ muscles (683830)  [] Group:      [] Other:       GOALS:  Patient stated goal:  less back pain. Walk/ stand better  [] Progressing: [] Met: [] Not Met: [] Adjusted    Therapist goals for Patient:   Short Term Goals: To be achieved in: 2 weeks  1. Independent in HEP and progression per patient tolerance, in order to prevent re-injury. [] Progressing: [] Met: [] Not Met: [] Adjusted  2. Patient will have a decrease in pain to facilitate improvement in movement, function, and ADLs as indicated by improvement with respect to Functional Deficits. [] Progressing: [] Met: [] Not Met: [] Adjusted    Long Term Goals: To be achieved in: 6 weeks  1. ZAIN functional survey score of </= 20% disability  to assist with reaching prior level of function. [] Progressing: [] Met: [] Not Met: [] Adjusted  2.  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 [] Progressing: [] Met: [] Not Met: [] Adjusted  3. Patient will demonstrate increased Strength and core activation to allow for proper functional mobility as indicated by patients Functional Deficits to allow pt to resume up/down 17 steps to enter 2nd floor condo  without increase in symptoms. [] Progressing: [] Met: [] Not Met: [] Adjusted  4. Patient will return to functional activities including grocery shopping, sleep thru night , position changes, prolonged standing x 15+ min, prolonged sitting prn for working as  without increased symptoms or restriction. [] Progressing: [] Met: [] Not Met: [] Adjusted      Overall Progression Towards Functional goals/ Treatment Progress Update:  [] Patient is progressing as expected towards functional goals listed. [] Progression is slowed due to complexities/Impairments listed. [] Progression has been slowed due to co-morbidities. [x] Plan just implemented, too soon to assess goals progression <30days   [] Goals require adjustment due to lack of progress  [] Patient is not progressing as expected and requires additional follow up with physician  [] Other    Persisting Functional Limitations/Impairments:  []Sleeping [x]Sitting               [x]Standing [x]Transfers        [x]Walking [x]Kneeling               [x]Stairs [x]Squatting / bending   [x]ADLs []Reaching  [x]Lifting  [x]Housework  []Driving [x]Job related tasks  [x]Sports/Recreation []Other:        ASSESSMENT: working on strengthening to improve standing posture; tends to fall into flexed pattern after all exercises for relief  Treatment/Activity Tolerance:  [x] Patient able to complete tx [] Patient limited by fatigue  [] Patient limited by pain  [] Patient limited by other medical complications  [] Other:     Prognosis: [x] Good [] Fair  [] Poor    Patient Requires Follow-up: [x] Yes  [] No    Plan for next treatment session: aquatics 1/wk, land 1x/wk.  Normalize gait as able, flexion bias, posture, progres pt with aquatic program so ready to do aquatic HEP upon dc    PLAN:  strength, ROM/flexibility, posture and body mechs, manual, MOC, HEP, pt education    Frequency/Duration:  2 days per week for   6 Weeks:  Interventions:  [x]  Therapeutic exercise including:strength, ROM, flexibility  [x]  NMR activation and proprioception including postural re-education  [x]  Manual therapy as indicated to include: IASTM, STM, PROM, Gr I-IV mobilizations, manipulation. [x]  Modalities as needed that may include: thermal agents, E-stim, Biofeedback, US, iontophoresis as indicated  [x]  Patient education on joint protection, postural re-education, activity modification, progression of HEP. Aquatic therapy    PLAN: See eval. PT 2x / week for 6 weeks. [x] Continue per plan of care [] Alter current plan (see comments)  [] Plan of care initiated [] Hold pending MD visit [] Discharge    Electronically signed by: Che Garcia, PT  DPT    Note: If patient does not return for scheduled/ recommended follow up visits, this note will serve as a discharge from care along with most recent update on progress.

## 2022-10-21 ENCOUNTER — HOSPITAL ENCOUNTER (OUTPATIENT)
Dept: PHYSICAL THERAPY | Age: 72
Setting detail: THERAPIES SERIES
Discharge: HOME | End: 2022-10-21
Payer: COMMERCIAL

## 2022-10-21 PROCEDURE — 97110 THERAPEUTIC EXERCISES: CPT

## 2022-10-21 PROCEDURE — 97112 NEUROMUSCULAR REEDUCATION: CPT

## 2022-10-21 NOTE — FLOWSHEET NOTE
[address] [facility]  Phone: [phone]   Fax: [phone]    Physical Therapy Daily Treatment Note    Date:  10/21/2022     Patient Name:  Marcos Culp    :  1950  MRN: [id number]  Medical Diagnosis:  DDD (degenerative disc disease), lumbar [M51.36]  Spondylolisthesis of lumbar region [M43.16]  Treatment Diagnosis: antalgia, flexed trunk, R>L knee pain contributing to assymmetrical gait, flexibility and trunk ROM deficits, strength deficits of trunk >LE mm. B knee flex contractures. Flexion of spine/trunk relieves back symptoms                                          Insurance/Certification information:  PT Insurance Information: UMR ded met, no copay, no auth, # of visits TBD  Physician Information:  Ernie Rodríguez,* date of referral to PT: 22  Plan of care signed (Y/N): [x]  Yes []  No     Date of Patient follow up with Physician:      Progress Report: []  Yes   [x]  No     Date Range for reporting period:  Beginning: 10/8/2022  Ending:     Progress report due (10 Rx/or 30 days whichever is less): visit #10 or       Recertification due (POC duration/ or 90 days whichever is less): visit #12 or 22 (date)     Visit # Insurance Allowable Auth required? Date Range    TBD []  Yes  [x]  No NA         Latex Allergy:  [x]NO      []YES  Preferred Language for Healthcare:   [x]English       []other:    Functional Scale:           Date assessed:  ZAIN score = 18/50         10/8/22    Pain level:  4/10     SUBJECTIVE:  feels more stiff today because worked from home. OBJECTIVE: 10/19: into clinic without AD with moderate antalgia; moderately flexed posture with noted glut med weakness when in SLS    RESTRICTIONS/PRECAUTIONS:  HTN, Knee OA - R>L - hoping to avoid TKRs. HAs, Lymphedema B LE- previous PT tx for it  - wears compression stockings, has lymphedema pump she uses 3-4x/wk.       Exercises/Interventions:     Therapeutic Exercises (07256) Resistance / level Sets/sec Reps Notes   Sci fit L1  6 min S=22, arms = 7    IB       Step stretches  HS  Hip flex/knee flex     2 x 20\" B  2 x 20\" B    Seated lumbar flex str with ball blue  X 15 with     Standing QL with ball at wall   X 10 B    Standing ext in // bars   X 15    Scap retraction in standing   X 15                  Therapeutic Activities (08318)       Postural ed/computer ergonomics, take breaks - including short walking breaks                           Gait (81006)       Gait trg Discussed possibly using a cane in the left hand to offset pain and stiffness in the right knee affecting gait; will practice with it next visit   Patient does not want to use a RW   Cables mid rows seated on SB 3 pl  X 15                  Neuromuscular Re-ed (05968)       Seated SB--AP, ML, circles each direction blue  X 10 each    Partial squats with ball on wall   X 15    Balance ex  SLR X 3 on airex     X 10 each B    Hip gliders ext and abd      AirEx in // bars  NBOS no hands, eyes closed  NBOS no hands with perturbations      SLS with hands on bars (on ground)      Manual Intervention (22275)       MET  As antionette                                           AquaticTherapy Dates of Service:   Aquatic Visits Exercises/Activities:   Transfers:  [x] Stairs   [] Ramp  [] Chair Lift   % Immersion:            Ambulation/ Warm up:   UE Exercises:       Forward   x Shoulder Shrugs      Lateral   x Shoulder Circles      Retro   x Scapular Retraction      [place]    Push Downs      Heel/Toe Walking    Punching       Rowing       Elbow Flex/Ext       Shldr Flex/Ext       [employer] aBd/aDd    LE Exercises:  Shldr Horiz aBd/aDd    HR/TR x Shldr IR/ER    Marches x Arm Circles    Squats  x PNF Diagonals    Hamstring Curls x Wall Push Ups    Hip Flexion (SLR) x     Hip aBduction (SLR) x     Hip Extension (SLR) x      Hip aDduction (SLR)      Hip Circles x Functional:    Hip IR/Er x Step up forward x   Hip Hikes  Step up lateral  x     Step down        Lunges Forward      Lunges Retro      Lunges Lateral     Balance:        SLS  x      Tandem Stance x      NBOS eyes open x Seated:     NBOS eyes closed x Ankle pumps  x   Hand to Opposite Knee x Ankle Circles     Fwd Step ups to SLS  Knee Flex/Ext x   Lateral Step ups to SLS  Hip aBd/aDd x   Stop/Go Gait   Bicycle  x     Ankle DF/PF      Ankle Inv/Ev    Stretching:       Gastroc/Soleus x     Hamstring  x Deep Water:    Knee Flex Stretch x Jog    Piriformis  x Noodle Hang    Hip Flexor x Traction at Wall    SKTC x     DKTC       ITB  Cool Down:    Quad  Fwd Walking    Mid Back   Lat Walking    UT  Retro Walking    Post Shoulder  Noodle float    Ladder Pull      Pec Stretch            Core: Other:    TrA set x     Pelvic Tilts      Multifidi Walk outs c paddle x     PNF Chop/Lifts                          Aquatic Abbreviation Key  B= Belt DB= Dumbells T= Theratube   H= Hydrotone N= Noodles W= Weights   P= Paddles S= Speedo equipment K= Kickboard      Pt. Education: 10/8/22 Gave pt tour of pool, explained how to use lockers, what to wear, that it would be in group setting, and showed pt aquatic equipment. Modalities:     Pt. Education:  10/08/2022  -patient educated on diagnosis, prognosis and expectations for rehab  -all patient questions were answered  Pt ed re rationale and role of aquatics and OA at knees and back    Home Exercise Program:  Access Code: [email or web address]  URL: [email or web address]  Date: 10/08/2022  Prepared by: Pratik Vyas    Exercises  Supine Transversus Abdominis Bracing - Hands on Stomach - 2 x daily - 7 x weekly - 1 sets - 10 reps - 5 hold  Hooklying Single Knee to Chest Stretch - 2 x daily - 7 x weekly - 1 sets - 3-5 reps - 15-30 hold  Seated Table Hamstring Stretch - 3 x daily - 7 x weekly - 1-2 sets - 3-5 reps - 15-30 hold  Seated Correct Posture - 2-3 x daily - 7 x weekly - 1-2 sets - 10 reps - 5-10 min hold  Walking - 2-4 x daily - 7 x weekly - 1-10 min hold        Therapeutic Exercise and NMR EXR  [x] (25974) Provided verbal/tactile cueing for activities related to strengthening, flexibility, endurance, ROM for improvements in  [] LE / Lumbar: LE, proximal hip, and core control with self care, mobility, lifting, ambulation. [] UE / Cervical: cervical, postural, scapular, scapulothoracic and UE control with self care, reaching, carrying, lifting, house/yardwork, driving, computer work. [x] (05306) Provided verbal/tactile cueing for activities related to improving balance, coordination, kinesthetic sense, posture, motor skill, proprioception to assist with   [] LE / lumbar: LE, proximal hip, and core control in self care, mobility, lifting, ambulation and eccentric single leg control. [] UE / cervical: cervical, scapular, scapulothoracic and UE control with self care, reaching, carrying, lifting, house/yardwork, driving, computer work. [x] (58910) Therapist is in constant attendance of 2 or more patients providing skilled therapy interventions, but not providing any significant amount of measurable one-on-one time to either patient, for improvements in  [] LE / lumbar: LE, proximal hip, and core control in self care, mobility, lifting, ambulation and eccentric single leg control. [] UE / cervical: cervical, scapular, scapulothoracic and UE control with self care, reaching, carrying, lifting, house/yardwork, driving, computer work. NMR and Therapeutic Activities:    [x] (90769 or 41239) Provided verbal/tactile cueing for activities related to improving balance, coordination, kinesthetic sense, posture, motor skill, proprioception and motor activation to allow for proper function of   [] LE: / Lumbar core, proximal hip and LE with self care and ADLs  [] UE / Cervical: cervical, postural, scapular, scapulothoracic and UE control with self care, carrying, lifting, driving, computer work.    [x] (71350) Gait Re-education- Provided training and instruction to the patient for proper LE, core and proximal hip recruitment and positioning and eccentric body weight control with ambulation re-education including up and down stairs     Home Management Training / Self Care:  [] (15183) Provided self-care/home management training related to activities of daily living and compensatory training, and/or use of adaptive equipment for improvement with: ADLs and compensatory training, meal preparation, safety procedures and instruction in use of adaptive equipment, including bathing, grooming, dressing, personal hygiene, basic household cleaning and chores. Home Exercise Program:    [] (01967) Reviewed/Progressed HEP activities related to strengthening, flexibility, endurance, ROM of   [] LE / Lumbar: core, proximal hip and LE for functional self-care, mobility, lifting and ambulation/stair navigation   [] UE / Cervical: cervical, postural, scapular, scapulothoracic and UE control with self care, reaching, carrying, lifting, house/yardwork, driving, computer work  [] (27572)Reviewed/Progressed HEP activities related to improving balance, coordination, kinesthetic sense, posture, motor skill, proprioception of   [] LE: core, proximal hip and LE for self care, mobility, lifting, and ambulation/stair navigation    [] UE / Cervical: cervical, postural,  scapular, scapulothoracic and UE control with self care, reaching, carrying, lifting, house/yardwork, driving, computer work    Manual Treatments:  PROM / STM / Oscillations-Mobs:  G-I, II, III, IV (PA's, Inf., Post.)  [] (69623) Provided manual therapy to mobilize LE, proximal hip and/or LS spine soft tissue/joints for the purpose of modulating pain, promoting relaxation,  increasing ROM, reducing/eliminating soft tissue swelling/inflammation/restriction, improving soft tissue extensibility and allowing for proper ROM for normal function with   [] LE / lumbar: self care, mobility, lifting and ambulation.     [] UE / Cervical: self care, reaching, carrying, lifting, house/yardwork, driving, computer work. Modalities:  [] (46252) Vasopneumatic compression: Utilized vasopneumatic compression to decrease edema / swelling for the purpose of improving mobility and quad tone / recruitment which will allow for increased overall function including but not limited to self-care, transfers, ambulation, and ascending / descending stairs. Charges:  Timed Code Treatment Minutes: 45   Total Treatment Minutes: 45     [] EVAL - LOW (03677)   [] EVAL - MOD (94252)  [] EVAL - HIGH (46595)  [] RE-EVAL (79822)  [x] JM(74613) x  1     [] Ionto  [x] NMR (29597) x  2     [] Vaso  [] Manual (60985) x       [] Ultrasound  [] TA x        [] Mech Traction (59998)  [] Aquatic Therapy x     [] ES (un) (36815):   [] Home Management Training x  [] ES(attended) (76436)   [] Dry Needling 1-2 muscles (14843):  [] Dry Needling 3+ muscles (535162)  [] Group:      [] Other:       GOALS:  Patient stated goal:  less back pain. Walk/ stand better  [] Progressing: [] Met: [] Not Met: [] Adjusted    Therapist goals for Patient:   Short Term Goals: To be achieved in: 2 weeks  1. Independent in HEP and progression per patient tolerance, in order to prevent re-injury. [] Progressing: [] Met: [] Not Met: [] Adjusted  2. Patient will have a decrease in pain to facilitate improvement in movement, function, and ADLs as indicated by improvement with respect to Functional Deficits. [] Progressing: [] Met: [] Not Met: [] Adjusted    Long Term Goals: To be achieved in: 6 weeks  1. ZAIN functional survey score of </= 20% disability  to assist with reaching prior level of function. [] Progressing: [] Met: [] Not Met: [] Adjusted  2.  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 [] Progressing: [] Met: [] Not Met: [] Adjusted  3. Patient will demonstrate increased Strength and core activation to allow for proper functional mobility as indicated by patients Functional Deficits to allow pt to resume up/down 17 steps to enter 2nd floor condo  without increase in symptoms. [] Progressing: [] Met: [] Not Met: [] Adjusted  4. Patient will return to functional activities including grocery shopping, sleep thru night , position changes, prolonged standing x 15+ min, prolonged sitting prn for working as  without increased symptoms or restriction. [] Progressing: [] Met: [] Not Met: [] Adjusted      Overall Progression Towards Functional goals/ Treatment Progress Update:  [] Patient is progressing as expected towards functional goals listed. [] Progression is slowed due to complexities/Impairments listed. [] Progression has been slowed due to co-morbidities.   [x] Plan just implemented, too soon to assess goals progression <30days   [] Goals require adjustment due to lack of progress  [] Patient is not progressing as expected and requires additional follow up with physician  [] Other    Persisting Functional Limitations/Impairments:  []Sleeping [x]Sitting               [x]Standing [x]Transfers        [x]Walking [x]Kneeling               [x]Stairs [x]Squatting / bending   [x]ADLs []Reaching  [x]Lifting  [x]Housework  []Driving [x]Job related tasks  [x]Sports/Recreation []Other:        ASSESSMENT: working on strengthening to improve standing posture; tends to fall into flexed pattern after all exercises for relief  Treatment/Activity Tolerance:  [x] Patient able to complete tx [] Patient limited by fatigue  [] Patient limited by pain  [] Patient limited by other medical complications  [] Other:     Prognosis: [x] Good [] Fair  [] Poor    Patient Requires Follow-up: [x] Yes  [] No    Plan for next treatment session: aquatics 1/wk, land 1x/wk. Normalize gait as able, flexion bias, posture, progres pt with aquatic program so ready to do aquatic HEP upon dc    PLAN:  strength, ROM/flexibility, posture and body mechs, manual, MOC, HEP, pt education    Frequency/Duration:  2 days per week for   6 Weeks:  Interventions:  [x]  Therapeutic exercise including:strength, ROM, flexibility  [x]  NMR activation and proprioception including postural re-education  [x]  Manual therapy as indicated to include: IASTM, STM, PROM, Gr I-IV mobilizations, manipulation. [x]  Modalities as needed that may include: thermal agents, E-stim, Biofeedback, US, iontophoresis as indicated  [x]  Patient education on joint protection, postural re-education, activity modification, progression of HEP. Aquatic therapy    PLAN: See eval. PT 2x / week for 6 weeks. [x] Continue per plan of care [] Alter current plan (see comments)  [] Plan of care initiated [] Hold pending MD visit [] Discharge    Electronically signed by: Samina Wu PT  DPT    Note: If patient does not return for scheduled/ recommended follow up visits, this note will serve as a discharge from care along with most recent update on progress.

## 2022-10-24 ENCOUNTER — HOSPITAL ENCOUNTER (OUTPATIENT)
Dept: PHYSICAL THERAPY | Age: 72
Setting detail: THERAPIES SERIES
Discharge: HOME | End: 2022-10-24
Payer: COMMERCIAL

## 2022-10-24 PROCEDURE — 97112 NEUROMUSCULAR REEDUCATION: CPT

## 2022-10-24 PROCEDURE — 97110 THERAPEUTIC EXERCISES: CPT

## 2022-10-24 PROCEDURE — 97140 MANUAL THERAPY 1/> REGIONS: CPT

## 2022-10-24 NOTE — FLOWSHEET NOTE
[address] [facility] Physical Therapy  Phone: [phone]   Fax: [phone]    Physical Therapy Daily Treatment Note    Date:  10/24/2022     Patient Name:  Roberta Washington    :  1950  MRN: [id number]  Medical Diagnosis:  DDD (degenerative disc disease), lumbar [M51.36]  Spondylolisthesis of lumbar region [M43.16]  Treatment Diagnosis: antalgia, flexed trunk, R>L knee pain contributing to assymmetrical gait, flexibility and trunk ROM deficits, strength deficits of trunk >LE mm. B knee flex contractures. Flexion of spine/trunk relieves back symptoms                                          Insurance/Certification information:  PT Insurance Information: UMR ded met, no copay, no auth, # of visits TBD  Physician Information:  Jevon Mccurdy,* date of referral to PT: 22  Plan of care signed (Y/N): [x]  Yes []  No     Date of Patient follow up with Physician:      Progress Report: []  Yes   [x]  No     Date Range for reporting period:  Beginning: 10/8/2022  Ending:     Progress report due (10 Rx/or 30 days whichever is less): visit #10 or 68      Recertification due (POC duration/ or 90 days whichever is less): visit #12 or 22 (date)     Visit # Insurance Allowable Auth required? Date Range    TBD []  Yes  [x]  No NA         Latex Allergy:  [x]NO      []YES  Preferred Language for Healthcare:   [x]English       []other:    Functional Scale:           Date assessed:  ZAIN score = 18/50         10/8/22    Pain level:  5/10     SUBJECTIVE:  worked from home today; feeling more stiff and sore; points to the sacrum as primary site of pain. Patient states that she doesn't want to do aquatics; will continue to see patient in the clinic  OBJECTIVE: 10/19: into clinic without AD with moderate antalgia; moderately flexed posture with noted glut med weakness when in SLS    RESTRICTIONS/PRECAUTIONS:  HTN, Knee OA - R>L - hoping to avoid TKRs.   HAs, Lymphedema B LE- previous PT tx for it  - wears compression stockings, has lymphedema pump she uses 3-4x/wk. Exercises/Interventions:     Therapeutic Exercises (02900) Resistance / level Sets/sec Reps Notes   Sci fit L1  6 min S=22, arms = 7    IB   2 x 20 sec B    Step stretches  HS  Hip flex/knee flex     2 x 20\" B  2 x 20\" B    Prone to prone on elbows   X 10 with 10 sec holds     Standing QL with ball at wall      Standing ext in // bars      Scap retraction in standing      Prone (over 2 pillows) knee flex  Prone over 2 pillow hip ext   X 10 B  X 10 B    Standing hip abd at wall   X 10 B    Therapeutic Activities (17018)       Postural ed/computer ergonomics, take breaks - including short walking breaks                           Gait (03902)       Gait trg Patient does not want to use a RW   Cables mid rows seated on SB                  Neuromuscular Re-ed (14094)       Seated SB--AP, ML, circles each direction    Partial squats with ball on wall    Balance ex  SLR X 3 on airex    Partial plank at table with alt a/l lift  Partial plank at table with lumb mobility   X 10   X 10    AirEx in // bars  NBOS no hands, eyes closed  NBOS no hands with perturbations      SLS with hands on bars (on ground)      Manual Intervention (84000)       MET  As antionette      STM/MFR sacrum       Ant glides in prone bilateral hips and anterolateral glides, grade 3   x    Prone quad stretch B   x                         Pt. Education: 10/8/22 Gave pt tour of pool, explained how to use lockers, what to wear, that it would be in group setting, and showed pt aquatic equipment.   10/24: gait training with cane and rollator; did not reduce pain in her lumbar spine and patient does not want to use and AD     Modalities:     Pt. Education:  10/08/2022  -patient educated on diagnosis, prognosis and expectations for rehab  -all patient questions were answered  Pt ed re rationale and role of aquatics and OA at knees and back    Home Exercise Program:  Access Code: [email or web address]  URL: [email or web address]  Date: 10/08/2022  Prepared by: Rick Constantino    Exercises  Supine Transversus Abdominis Bracing - Hands on Stomach - 2 x daily - 7 x weekly - 1 sets - 10 reps - 5 hold  Hooklying Single Knee to Chest Stretch - 2 x daily - 7 x weekly - 1 sets - 3-5 reps - 15-30 hold  Seated Table Hamstring Stretch - 3 x daily - 7 x weekly - 1-2 sets - 3-5 reps - 15-30 hold  Seated Correct Posture - 2-3 x daily - 7 x weekly - 1-2 sets - 10 reps - 5-10 min hold  Walking - 2-4 x daily - 7 x weekly - 1-10 min hold        Therapeutic Exercise and NMR EXR  [x] (88706) Provided verbal/tactile cueing for activities related to strengthening, flexibility, endurance, ROM for improvements in  [] LE / Lumbar: LE, proximal hip, and core control with self care, mobility, lifting, ambulation. [] UE / Cervical: cervical, postural, scapular, scapulothoracic and UE control with self care, reaching, carrying, lifting, house/yardwork, driving, computer work. [x] (37035) Provided verbal/tactile cueing for activities related to improving balance, coordination, kinesthetic sense, posture, motor skill, proprioception to assist with   [] LE / lumbar: LE, proximal hip, and core control in self care, mobility, lifting, ambulation and eccentric single leg control. [] UE / cervical: cervical, scapular, scapulothoracic and UE control with self care, reaching, carrying, lifting, house/yardwork, driving, computer work. [x] (21862) Therapist is in constant attendance of 2 or more patients providing skilled therapy interventions, but not providing any significant amount of measurable one-on-one time to either patient, for improvements in  [] LE / lumbar: LE, proximal hip, and core control in self care, mobility, lifting, ambulation and eccentric single leg control.    [] UE / cervical: cervical, scapular, scapulothoracic and UE control with self care, reaching, carrying, lifting, house/yardwork, driving, computer work. NMR and Therapeutic Activities:    [x] (87984 or 16921) Provided verbal/tactile cueing for activities related to improving balance, coordination, kinesthetic sense, posture, motor skill, proprioception and motor activation to allow for proper function of   [] LE: / Lumbar core, proximal hip and LE with self care and ADLs  [] UE / Cervical: cervical, postural, scapular, scapulothoracic and UE control with self care, carrying, lifting, driving, computer work. [x] (33489) Gait Re-education- Provided training and instruction to the patient for proper LE, core and proximal hip recruitment and positioning and eccentric body weight control with ambulation re-education including up and down stairs     Home Management Training / Self Care:  [] (59401) Provided self-care/home management training related to activities of daily living and compensatory training, and/or use of adaptive equipment for improvement with: ADLs and compensatory training, meal preparation, safety procedures and instruction in use of adaptive equipment, including bathing, grooming, dressing, personal hygiene, basic household cleaning and chores.      Home Exercise Program:    [] (87604) Reviewed/Progressed HEP activities related to strengthening, flexibility, endurance, ROM of   [] LE / Lumbar: core, proximal hip and LE for functional self-care, mobility, lifting and ambulation/stair navigation   [] UE / Cervical: cervical, postural, scapular, scapulothoracic and UE control with self care, reaching, carrying, lifting, house/yardwork, driving, computer work  [] (63321)Reviewed/Progressed HEP activities related to improving balance, coordination, kinesthetic sense, posture, motor skill, proprioception of   [] LE: core, proximal hip and LE for self care, mobility, lifting, and ambulation/stair navigation    [] UE / Cervical: cervical, postural,  scapular, scapulothoracic and UE control with self care, reaching, carrying, lifting, house/yardwork, driving, computer work    Manual Treatments:  PROM / STM / Oscillations-Mobs:  G-I, II, III, IV (PA's, Inf., Post.)  [x] (53386) Provided manual therapy to mobilize LE, proximal hip and/or LS spine soft tissue/joints for the purpose of modulating pain, promoting relaxation,  increasing ROM, reducing/eliminating soft tissue swelling/inflammation/restriction, improving soft tissue extensibility and allowing for proper ROM for normal function with   [x] LE / lumbar: self care, mobility, lifting and ambulation. [] UE / Cervical: self care, reaching, carrying, lifting, house/yardwork, driving, computer work. Modalities:  [] (57132) Vasopneumatic compression: Utilized vasopneumatic compression to decrease edema / swelling for the purpose of improving mobility and quad tone / recruitment which will allow for increased overall function including but not limited to self-care, transfers, ambulation, and ascending / descending stairs. Charges:  Timed Code Treatment Minutes: 55   Total Treatment Minutes: 55     [] EVAL - LOW (75239)   [] EVAL - MOD (01521)  [] EVAL - HIGH (53282)  [] RE-EVAL (93731)  [x] IG(85191) x  1     [] Ionto  [x] NMR (38421) x  2     [] Vaso  [x] Manual (85731) x  1     [] Ultrasound  [] TA x        [] Mech Traction (00058)  [] Aquatic Therapy x     [] ES (un) (56396):   [] Home Management Training x  [] ES(attended) (53846)   [] Dry Needling 1-2 muscles (54776):  [] Dry Needling 3+ muscles (856743)  [] Group:      [] Other:       GOALS:  Patient stated goal:  less back pain. Walk/ stand better  [] Progressing: [] Met: [] Not Met: [] Adjusted    Therapist goals for Patient:   Short Term Goals: To be achieved in: 2 weeks  1. Independent in HEP and progression per patient tolerance, in order to prevent re-injury. [] Progressing: [] Met: [] Not Met: [] Adjusted  2.  Patient will have a decrease in pain to facilitate improvement in movement, function, and ADLs as indicated by improvement with respect to Functional Deficits. [] Progressing: [] Met: [] Not Met: [] Adjusted    Long Term Goals: To be achieved in: 6 weeks  1. ZAIN functional survey score of </= 20% disability  to assist with reaching prior level of function. [] Progressing: [] Met: [] Not Met: [] Adjusted  2. Patient will demonstrate increased AROM  [facility], to allow for proper joint functioning to allow pt to antionette good posture and ergonomics for working at [employer] for her  job, resume  standing upright prn for cooking, ADLs, reaching into kitchen cabinets, using good posture and body mechs without increase in symptoms. [] Progressing: [] Met: [] Not Met: [] Adjusted  3. Patient will demonstrate increased Strength and core activation to allow for proper functional mobility as indicated by patients Functional Deficits to allow pt to resume up/down 17 steps to enter 2nd floor condo  without increase in symptoms. [] Progressing: [] Met: [] Not Met: [] Adjusted  4. Patient will return to functional activities including grocery shopping, sleep thru night , position changes, prolonged standing x 15+ min, prolonged sitting prn for working as  without increased symptoms or restriction. [] Progressing: [] Met: [] Not Met: [] Adjusted      Overall Progression Towards Functional goals/ Treatment Progress Update:  [] Patient is progressing as expected towards functional goals listed. [] Progression is slowed due to complexities/Impairments listed. [] Progression has been slowed due to co-morbidities.   [x] Plan just implemented, too soon to assess goals progression <30days   [] Goals require adjustment due to lack of progress  [] Patient is not progressing as expected and requires additional follow up with physician  [] Other    Persisting Functional Limitations/Impairments:  []Sleeping [x]Sitting               [x]Standing [x]Transfers        [x]Walking [x]Kneeling               [x]Stairs [x]Squatting / bending   [x]ADLs []Reaching  [x]Lifting  [x]Housework  []Driving [x]Job related tasks  [x]Sports/Recreation []Other:        ASSESSMENT: patient was able to tolerate prone position fairly well for some extensor strengthening and anterior stretching  Treatment/Activity Tolerance:  [x] Patient able to complete tx [] Patient limited by fatigue  [] Patient limited by pain  [] Patient limited by other medical complications  [] Other:     Prognosis: [x] Good [] Fair  [] Poor    Patient Requires Follow-up: [x] Yes  [] No    Plan for next treatment session:continue stretching and strengthening with land visits  PLAN:  strength, ROM/flexibility, posture and body mechs, manual, MOC, HEP, pt education    Frequency/Duration:  2 days per week for   6 Weeks:  Interventions:  [x]  Therapeutic exercise including:strength, ROM, flexibility  [x]  NMR activation and proprioception including postural re-education  [x]  Manual therapy as indicated to include: IASTM, STM, PROM, Gr I-IV mobilizations, manipulation. [x]  Modalities as needed that may include: thermal agents, E-stim, Biofeedback, US, iontophoresis as indicated  [x]  Patient education on joint protection, postural re-education, activity modification, progression of HEP. Aquatic therapy    PLAN: See eval. PT 2x / week for 6 weeks. [x] Continue per plan of care [] Alter current plan (see comments)  [] Plan of care initiated [] Hold pending MD visit [] Discharge    Electronically signed by: Gracie Winters PT  DPT    Note: If patient does not return for scheduled/ recommended follow up visits, this note will serve as a discharge from care along with most recent update on progress.

## 2022-10-27 ENCOUNTER — APPOINTMENT (OUTPATIENT)
Dept: PHYSICAL THERAPY | Age: 72
End: 2022-10-27
Payer: COMMERCIAL

## 2022-10-31 ENCOUNTER — HOSPITAL ENCOUNTER (OUTPATIENT)
Dept: PHYSICAL THERAPY | Age: 72
Setting detail: THERAPIES SERIES
Discharge: HOME | End: 2022-10-31
Payer: COMMERCIAL

## 2022-10-31 PROCEDURE — 97110 THERAPEUTIC EXERCISES: CPT

## 2022-10-31 PROCEDURE — 97140 MANUAL THERAPY 1/> REGIONS: CPT

## 2022-10-31 PROCEDURE — 97112 NEUROMUSCULAR REEDUCATION: CPT

## 2022-10-31 NOTE — FLOWSHEET NOTE
[facility]  Phone: [phone]   Fax: [phone]    Physical Therapy Daily Treatment Note    Date:  10/31/2022     Patient Name:  Glenn Stanford    :  1950  MRN: [id number]  Medical Diagnosis:  DDD (degenerative disc disease), lumbar [M51.36]  Spondylolisthesis of lumbar region [M43.16]  Treatment Diagnosis: antalgia, flexed trunk, R>L knee pain contributing to assymmetrical gait, flexibility and trunk ROM deficits, strength deficits of trunk >LE mm. B knee flex contractures. Flexion of spine/trunk relieves back symptoms                                          Insurance/Certification information:  PT Insurance Information: [employer] ded met, no copay, no auth, # of visits TBD  Physician Information:  Shana Deng,* date of referral to PT: 22  Plan of care signed (Y/N): [x]  Yes []  No     Date of Patient follow up with Physician:      Progress Report: []  Yes   [x]  No     Date Range for reporting period:  Beginning: 10/8/2022  Ending:     Progress report due (10 Rx/or 30 days whichever is less): visit #10 or       Recertification due (POC duration/ or 90 days whichever is less): visit #12 or 22 (date)     Visit # Insurance Allowable Auth required? Date Range    TBD []  Yes  [x]  No NA         Latex Allergy:  [x]NO      []YES  Preferred Language for Healthcare:   [x]English       []other:    Functional Scale:           Date assessed:  ZAIN score = 18/50         10/8/22    Pain level:  5/10     SUBJECTIVE:  OBJECTIVE: 10/19: into clinic without AD with moderate antalgia; moderately flexed posture with noted glut med weakness when in SLS    RESTRICTIONS/PRECAUTIONS:  HTN, Knee OA - R>L - hoping to avoid TKRs. HAs, Lymphedema B LE- previous PT tx for it  - wears compression stockings, has lymphedema pump she uses 3-4x/wk.       Exercises/Interventions:     Therapeutic Exercises (82289) Resistance / level Sets/sec Reps Notes   Sci fit L1  6 min S=22, arms = 7    IB   2 x 30 sec B    Step stretches  HS  Hip flex/knee flex     2 x 30\" B  2 x 30\" B    Prone to prone on elbows   X 10 with 10 sec holds     Standing QL with ball at wall      Standing ext in // bars      Scap retraction in standing      Prone (over 2 pillows) knee flex  Prone over 2 pillow hip ext   X 10 B  X 10 B    Standing hip abd at wall   X 10 B    Therapeutic Activities (58789)       Postural ed/computer ergonomics, take breaks - including short walking breaks                           Gait (84398)           Cables: mid rows standing  Shld ext  Shld add   3 pl  2 pl  2 pl X 20  X 10 B  X 10 B                  Neuromuscular Re-ed (46505)       Seated SB--AP, ML, circles each direction    Partial squats with ball on wall    Balance ex  SLR X 3 on airex    Partial plank at table with alt a/l lift  Partial plank at table with lumb mobility   X 10   X 10    AirEx in // bars  NBOS no hands, eyes closed  NBOS no hands with perturbations      SLS with hands on bars (on ground)      Manual Intervention (93854)       MET  As antionette      STM/MFR sacrum       Ant glides in prone bilateral hips and anterolateral glides, grade 3   x    Prone quad stretch B   x                         Pt. Education: 10/8/22 Gave pt tour of pool, explained how to use lockers, what to wear, that it would be in group setting, and showed pt aquatic equipment. 10/24: gait training with cane and rollator; did not reduce pain in her lumbar spine and patient does not want to use and AD     Modalities:     Pt. Education:  10/08/2022  -patient educated on diagnosis, prognosis and expectations for rehab  -all patient questions were answered  Pt ed re rationale and role of aquatics and OA at knees and back    Home Exercise Program:  Access Code: [email or web address]  URL: [email or web address]  Date: 10/08/2022  Prepared by: An Erickson    Exercises  Supine Transversus Abdominis Bracing - Hands on Stomach - 2 x daily - 7 x weekly - 1 sets - 10 reps - 5 hold  Hooklying Single Knee to Chest Stretch - 2 x daily - 7 x weekly - 1 sets - 3-5 reps - 15-30 hold  Seated Table Hamstring Stretch - 3 x daily - 7 x weekly - 1-2 sets - 3-5 reps - 15-30 hold  Seated Correct Posture - 2-3 x daily - 7 x weekly - 1-2 sets - 10 reps - 5-10 min hold  Walking - 2-4 x daily - 7 x weekly - 1-10 min hold        Therapeutic Exercise and NMR EXR  [x] (00158) Provided verbal/tactile cueing for activities related to strengthening, flexibility, endurance, ROM for improvements in  [] LE / Lumbar: LE, proximal hip, and core control with self care, mobility, lifting, ambulation. [] UE / Cervical: cervical, postural, scapular, scapulothoracic and UE control with self care, reaching, carrying, lifting, house/yardwork, driving, computer work. [x] (98553) Provided verbal/tactile cueing for activities related to improving balance, coordination, kinesthetic sense, posture, motor skill, proprioception to assist with   [] LE / lumbar: LE, proximal hip, and core control in self care, mobility, lifting, ambulation and eccentric single leg control. [] UE / cervical: cervical, scapular, scapulothoracic and UE control with self care, reaching, carrying, lifting, house/yardwork, driving, computer work. [x] (44107) Therapist is in constant attendance of 2 or more patients providing skilled therapy interventions, but not providing any significant amount of measurable one-on-one time to either patient, for improvements in  [] LE / lumbar: LE, proximal hip, and core control in self care, mobility, lifting, ambulation and eccentric single leg control. [] UE / cervical: cervical, scapular, scapulothoracic and UE control with self care, reaching, carrying, lifting, house/yardwork, driving, computer work.      NMR and Therapeutic Activities:    [x] (04258 or 46581) Provided verbal/tactile cueing for activities related to improving balance, coordination, kinesthetic sense, posture, motor skill, proprioception and motor activation to allow for proper function of   [] LE: / Lumbar core, proximal hip and LE with self care and ADLs  [] UE / Cervical: cervical, postural, scapular, scapulothoracic and UE control with self care, carrying, lifting, driving, computer work. [x] (70043) Gait Re-education- Provided training and instruction to the patient for proper LE, core and proximal hip recruitment and positioning and eccentric body weight control with ambulation re-education including up and down stairs     Home Management Training / Self Care:  [] (83883) Provided self-care/home management training related to activities of daily living and compensatory training, and/or use of adaptive equipment for improvement with: ADLs and compensatory training, meal preparation, safety procedures and instruction in use of adaptive equipment, including bathing, grooming, dressing, personal hygiene, basic household cleaning and chores.      Home Exercise Program:    [] (29414) Reviewed/Progressed HEP activities related to strengthening, flexibility, endurance, ROM of   [] LE / Lumbar: core, proximal hip and LE for functional self-care, mobility, lifting and ambulation/stair navigation   [] UE / Cervical: cervical, postural, scapular, scapulothoracic and UE control with self care, reaching, carrying, lifting, house/yardwork, driving, computer work  [] (91294)Reviewed/Progressed HEP activities related to improving balance, coordination, kinesthetic sense, posture, motor skill, proprioception of   [] LE: core, proximal hip and LE for self care, mobility, lifting, and ambulation/stair navigation    [] UE / Cervical: cervical, postural,  scapular, scapulothoracic and UE control with self care, reaching, carrying, lifting, house/yardwork, driving, computer work    Manual Treatments:  PROM / STM / Oscillations-Mobs:  G-I, II, III, IV (PA's, Inf., Post.)  [x] (13693) Provided manual therapy to mobilize LE, proximal hip and/or LS spine soft tissue/joints for the purpose of modulating pain, promoting relaxation,  increasing ROM, reducing/eliminating soft tissue swelling/inflammation/restriction, improving soft tissue extensibility and allowing for proper ROM for normal function with   [x] LE / lumbar: self care, mobility, lifting and ambulation. [] UE / Cervical: self care, reaching, carrying, lifting, house/yardwork, driving, computer work. Modalities:  [] (87691) Vasopneumatic compression: Utilized vasopneumatic compression to decrease edema / swelling for the purpose of improving mobility and quad tone / recruitment which will allow for increased overall function including but not limited to self-care, transfers, ambulation, and ascending / descending stairs. Charges:  Timed Code Treatment Minutes: 50   Total Treatment Minutes: 50     [] EVAL - LOW (75439)   [] EVAL - MOD (33081)  [] EVAL - HIGH (00026)  [] RE-EVAL (21929)  [x] OY(39721) x  1     [] Ionto  [x] NMR (13361) x  1     [] Vaso  [x] Manual (47175) x  1     [] Ultrasound  [] TA x        [] Mech Traction (38371)  [] Aquatic Therapy x     [] ES (un) (73027):   [] Home Management Training x  [] ES(attended) (45776)   [] Dry Needling 1-2 muscles (36940):  [] Dry Needling 3+ muscles (890115)  [] Group:      [] Other:       GOALS:  Patient stated goal:  less back pain. Walk/ stand better  [] Progressing: [] Met: [] Not Met: [] Adjusted    Therapist goals for Patient:   Short Term Goals: To be achieved in: 2 weeks  1. Independent in HEP and progression per patient tolerance, in order to prevent re-injury. [] Progressing: [] Met: [] Not Met: [] Adjusted  2. Patient will have a decrease in pain to facilitate improvement in movement, function, and ADLs as indicated by improvement with respect to Functional Deficits. [] Progressing: [] Met: [] Not Met: [] Adjusted    Long Term Goals: To be achieved in: 6 weeks  1.  ZAIN functional survey score of </= 20% disability  to assist with reaching prior level of function. [] Progressing: [] Met: [] Not Met: [] Adjusted  2. 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 [] Progressing: [] Met: [] Not Met: [] Adjusted  3. Patient will demonstrate increased Strength and core activation to allow for proper functional mobility as indicated by patients Functional Deficits to allow pt to resume up/down 17 steps to enter 2nd floor condo  without increase in symptoms. [] Progressing: [] Met: [] Not Met: [] Adjusted  4. Patient will return to functional activities including grocery shopping, sleep thru night , position changes, prolonged standing x 15+ min, prolonged sitting prn for working as  without increased symptoms or restriction. [] Progressing: [] Met: [] Not Met: [] Adjusted      Overall Progression Towards Functional goals/ Treatment Progress Update:  [] Patient is progressing as expected towards functional goals listed. [] Progression is slowed due to complexities/Impairments listed. [] Progression has been slowed due to co-morbidities.   [x] Plan just implemented, too soon to assess goals progression <30days   [] Goals require adjustment due to lack of progress  [] Patient is not progressing as expected and requires additional follow up with physician  [] Other    Persisting Functional Limitations/Impairments:  []Sleeping [x]Sitting               [x]Standing [x]Transfers        [x]Walking [x]Kneeling               [x]Stairs [x]Squatting / bending   [x]ADLs []Reaching  [x]Lifting  [x]Housework  []Driving [x]Job related tasks  [x]Sports/Recreation []Other:        ASSESSMENT: continue to promote improved posture and strengthening of the posterior chain  Treatment/Activity Tolerance:  [x] Patient able to complete tx [] Patient limited by fatigue  [] Patient limited by pain  [] Patient limited by other medical complications  [] Other:     Prognosis: [x] Good [] Fair  [] Poor    Patient Requires Follow-up: [x] Yes  [] No    Plan for next treatment session:continue stretching and strengthening with land visits  PLAN:  strength, ROM/flexibility, posture and body mechs, manual, MOC, HEP, pt education    Frequency/Duration:  2 days per week for   6 Weeks:  Interventions:  [x]  Therapeutic exercise including:strength, ROM, flexibility  [x]  NMR activation and proprioception including postural re-education  [x]  Manual therapy as indicated to include: IASTM, STM, PROM, Gr I-IV mobilizations, manipulation. [x]  Modalities as needed that may include: thermal agents, E-stim, Biofeedback, US, iontophoresis as indicated  [x]  Patient education on joint protection, postural re-education, activity modification, progression of HEP. Aquatic therapy    PLAN: See eval. PT 2x / week for 6 weeks. [x] Continue per plan of care [] Alter current plan (see comments)  [] Plan of care initiated [] Hold pending MD visit [] Discharge    Electronically signed by: Natalie Owens, PT  DPT    Note: If patient does not return for scheduled/ recommended follow up visits, this note will serve as a discharge from care along with most recent update on progress.

## 2022-11-02 RX ORDER — CHOLESTYRAMINE 4 G/9G
POWDER, FOR SUSPENSION ORAL
Qty: 180 PACKET | Refills: 1 | Status: SHIPPED | OUTPATIENT
Start: 2022-11-02

## 2022-11-05 DIAGNOSIS — Z13.9 SCREENING DUE: ICD-10-CM

## 2022-11-05 DIAGNOSIS — I10 PRIMARY HYPERTENSION: ICD-10-CM

## 2022-11-05 DIAGNOSIS — E55.9 VITAMIN D DEFICIENCY: ICD-10-CM

## 2022-11-05 DIAGNOSIS — I73.9 PERIPHERAL ARTERIAL DISEASE (HCC): ICD-10-CM

## 2022-11-05 LAB
A/G RATIO: 2 (ref 1.1–2.2)
ALBUMIN SERPL-MCNC: 4.5 G/DL (ref 3.4–5)
ALP BLD-CCNC: 103 U/L (ref 40–129)
ALT SERPL-CCNC: 16 U/L (ref 10–40)
ANION GAP SERPL CALCULATED.3IONS-SCNC: 12 MMOL/L (ref 3–16)
AST SERPL-CCNC: 12 U/L (ref 15–37)
BILIRUB SERPL-MCNC: 0.6 MG/DL (ref 0–1)
BUN BLDV-MCNC: 14 MG/DL (ref 7–20)
CALCIUM SERPL-MCNC: 9 MG/DL (ref 8.3–10.6)
CHLORIDE BLD-SCNC: 106 MMOL/L (ref 99–110)
CHOLESTEROL, FASTING: 160 MG/DL (ref 0–199)
CO2: 24 MMOL/L (ref 21–32)
CREAT SERPL-MCNC: <0.5 MG/DL (ref 0.6–1.2)
GFR SERPL CREATININE-BSD FRML MDRD: >60 ML/MIN/{1.73_M2}
GLUCOSE BLD-MCNC: 92 MG/DL (ref 70–99)
HCT VFR BLD CALC: 40.4 % (ref 36–48)
HDLC SERPL-MCNC: 52 MG/DL (ref 40–60)
HEMOGLOBIN: 13.9 G/DL (ref 12–16)
LDL CHOLESTEROL CALCULATED: 91 MG/DL
MCH RBC QN AUTO: 29.4 PG (ref 26–34)
MCHC RBC AUTO-ENTMCNC: 34.3 G/DL (ref 31–36)
MCV RBC AUTO: 85.7 FL (ref 80–100)
PDW BLD-RTO: 15.4 % (ref 12.4–15.4)
PLATELET # BLD: 250 K/UL (ref 135–450)
PMV BLD AUTO: 8.4 FL (ref 5–10.5)
POTASSIUM SERPL-SCNC: 4.6 MMOL/L (ref 3.5–5.1)
RBC # BLD: 4.71 M/UL (ref 4–5.2)
SODIUM BLD-SCNC: 142 MMOL/L (ref 136–145)
TOTAL PROTEIN: 6.7 G/DL (ref 6.4–8.2)
TRIGLYCERIDE, FASTING: 83 MG/DL (ref 0–150)
TSH REFLEX: 0.64 UIU/ML (ref 0.27–4.2)
VITAMIN D 25-HYDROXY: 21.1 NG/ML
VLDLC SERPL CALC-MCNC: 17 MG/DL
WBC # BLD: 6.2 K/UL (ref 4–11)

## 2022-11-06 NOTE — RESULT ENCOUNTER NOTE
Inform the patient that her labs look great, except the vitamin D levels are low.  She needs to get the counter vitamin D 5000 international units capsules and use 1 daily with a little bit of food.  We can discuss further at her upcoming visit 11/7/2022

## 2022-11-07 ENCOUNTER — HOSPITAL ENCOUNTER (OUTPATIENT)
Dept: PHYSICAL THERAPY | Age: 72
Setting detail: THERAPIES SERIES
Discharge: HOME | End: 2022-11-07
Payer: COMMERCIAL

## 2022-11-07 PROCEDURE — 97530 THERAPEUTIC ACTIVITIES: CPT

## 2022-11-07 PROCEDURE — 97140 MANUAL THERAPY 1/> REGIONS: CPT

## 2022-11-07 PROCEDURE — 97112 NEUROMUSCULAR REEDUCATION: CPT

## 2022-11-07 NOTE — FLOWSHEET NOTE
[facility] Physical Therapy  Phone: [phone]   Fax: [phone]    Physical Therapy Daily Treatment Note    Date:  2022     Patient Name:  Mario Mitchell    :  1950  MRN: [id number]  Medical Diagnosis:  DDD (degenerative disc disease), lumbar [M51.36]  Spondylolisthesis of lumbar region [M43.16]  Treatment Diagnosis: antalgia, flexed trunk, R>L knee pain contributing to assymmetrical gait, flexibility and trunk ROM deficits, strength deficits of trunk >LE mm. B knee flex contractures. Flexion of spine/trunk relieves back symptoms                                          Insurance/Certification information:  PT Insurance Information: [employer] ded met, no copay, no auth, # of visits TBD  Physician Information:  Robert Mg,* date of referral to PT: 22  Plan of care signed (Y/N): [x]  Yes []  No     Date of Patient follow up with Physician:      Progress Report: []  Yes   [x]  No     Date Range for reporting period:  Beginning: 10/8/2022  Ending:     Progress report due (10 Rx/or 30 days whichever is less): visit #10 or       Recertification due (POC duration/ or 90 days whichever is less): visit #12 or 22 (date)     Visit # Insurance Allowable Auth required? Date Range    TBD []  Yes  [x]  No NA         Latex Allergy:  [x]NO      []YES  Preferred Language for Healthcare:   [x]English       []other:    Functional Scale:           Date assessed:  ZAIN score = 18/50         10/8/22    Pain level:  5/10     SUBJECTIVE:  reporting good days and bad days. Has been trying the prone exercise at home  OBJECTIVE: : walking without AD with moderate antalgia due to hip and knee weakness; stands and walks in a forward bent posture; able to extend lumbar spine 10 deg    RESTRICTIONS/PRECAUTIONS:  HTN, Knee OA - R>L - hoping to avoid TKRs.   HAs, Lymphedema B LE- previous PT tx for it  - wears compression stockings, has lymphedema pump she Hands on Stomach - 2 x daily - 7 x weekly - 1 sets - 10 reps - 5 hold  Hooklying Single Knee to Chest Stretch - 2 x daily - 7 x weekly - 1 sets - 3-5 reps - 15-30 hold  Seated Table Hamstring Stretch - 3 x daily - 7 x weekly - 1-2 sets - 3-5 reps - 15-30 hold  Seated Correct Posture - 2-3 x daily - 7 x weekly - 1-2 sets - 10 reps - 5-10 min hold  Walking - 2-4 x daily - 7 x weekly - 1-10 min hold        Therapeutic Exercise and NMR EXR  [x] (32838) Provided verbal/tactile cueing for activities related to strengthening, flexibility, endurance, ROM for improvements in  [] LE / Lumbar: LE, proximal hip, and core control with self care, mobility, lifting, ambulation. [] UE / Cervical: cervical, postural, scapular, scapulothoracic and UE control with self care, reaching, carrying, lifting, house/yardwork, driving, computer work. [x] (89751) Provided verbal/tactile cueing for activities related to improving balance, coordination, kinesthetic sense, posture, motor skill, proprioception to assist with   [] LE / lumbar: LE, proximal hip, and core control in self care, mobility, lifting, ambulation and eccentric single leg control. [] UE / cervical: cervical, scapular, scapulothoracic and UE control with self care, reaching, carrying, lifting, house/yardwork, driving, computer work. [x] (17149) Therapist is in constant attendance of 2 or more patients providing skilled therapy interventions, but not providing any significant amount of measurable one-on-one time to either patient, for improvements in  [] LE / lumbar: LE, proximal hip, and core control in self care, mobility, lifting, ambulation and eccentric single leg control. [] UE / cervical: cervical, scapular, scapulothoracic and UE control with self care, reaching, carrying, lifting, house/yardwork, driving, computer work.      NMR and Therapeutic Activities:    [x] (69100 or 17969) Provided verbal/tactile cueing for activities related to improving balance, coordination, kinesthetic sense, posture, motor skill, proprioception and motor activation to allow for proper function of   [] LE: / Lumbar core, proximal hip and LE with self care and ADLs  [] UE / Cervical: cervical, postural, scapular, scapulothoracic and UE control with self care, carrying, lifting, driving, computer work. [x] (65360) Gait Re-education- Provided training and instruction to the patient for proper LE, core and proximal hip recruitment and positioning and eccentric body weight control with ambulation re-education including up and down stairs     Home Management Training / Self Care:  [] (19902) Provided self-care/home management training related to activities of daily living and compensatory training, and/or use of adaptive equipment for improvement with: ADLs and compensatory training, meal preparation, safety procedures and instruction in use of adaptive equipment, including bathing, grooming, dressing, personal hygiene, basic household cleaning and chores.      Home Exercise Program:    [] (12419) Reviewed/Progressed HEP activities related to strengthening, flexibility, endurance, ROM of   [] LE / Lumbar: core, proximal hip and LE for functional self-care, mobility, lifting and ambulation/stair navigation   [] UE / Cervical: cervical, postural, scapular, scapulothoracic and UE control with self care, reaching, carrying, lifting, house/yardwork, driving, computer work  [] (84273)Reviewed/Progressed HEP activities related to improving balance, coordination, kinesthetic sense, posture, motor skill, proprioception of   [] LE: core, proximal hip and LE for self care, mobility, lifting, and ambulation/stair navigation    [] UE / Cervical: cervical, postural,  scapular, scapulothoracic and UE control with self care, reaching, carrying, lifting, house/yardwork, driving, computer work    Manual Treatments:  PROM / STM / Oscillations-Mobs:  G-I, II, III, IV (PA's, Inf., Post.)  [x] (62798) Provided manual therapy to mobilize LE, proximal hip and/or LS spine soft tissue/joints for the purpose of modulating pain, promoting relaxation,  increasing ROM, reducing/eliminating soft tissue swelling/inflammation/restriction, improving soft tissue extensibility and allowing for proper ROM for normal function with   [x] LE / lumbar: self care, mobility, lifting and ambulation. [] UE / Cervical: self care, reaching, carrying, lifting, house/yardwork, driving, computer work. Modalities:  [] (65444) Vasopneumatic compression: Utilized vasopneumatic compression to decrease edema / swelling for the purpose of improving mobility and quad tone / recruitment which will allow for increased overall function including but not limited to self-care, transfers, ambulation, and ascending / descending stairs. Charges:  Timed Code Treatment Minutes: 50   Total Treatment Minutes: 50     [] EVAL - LOW (57830)   [] EVAL - MOD (95282)  [] EVAL - HIGH (92308)  [] RE-EVAL (87369)  [x] VU(76100) x  1     [] Ionto  [x] NMR (10869) x  1     [] Vaso  [x] Manual (31849) x  1     [] Ultrasound  [] TA x        [] Mech Traction (78264)  [] Aquatic Therapy x     [] ES (un) (89292):   [] Home Management Training x  [] ES(attended) (48103)   [] Dry Needling 1-2 muscles (04345):  [] Dry Needling 3+ muscles (948039)  [] Group:      [] Other:       GOALS:  Patient stated goal:  less back pain. Walk/ stand better  [] Progressing: [] Met: [] Not Met: [] Adjusted    Therapist goals for Patient:   Short Term Goals: To be achieved in: 2 weeks  1. Independent in HEP and progression per patient tolerance, in order to prevent re-injury. [] Progressing: [] Met: [] Not Met: [] Adjusted  2. Patient will have a decrease in pain to facilitate improvement in movement, function, and ADLs as indicated by improvement with respect to Functional Deficits. [] Progressing: [] Met: [] Not Met: [] Adjusted    Long Term Goals: To be achieved in: 6 weeks  1.  ZAIN functional survey score of </= 20% disability  to assist with reaching prior level of function. [] Progressing: [] Met: [] Not Met: [] Adjusted  2. 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 [] Progressing: [] Met: [] Not Met: [] Adjusted  3. Patient will demonstrate increased Strength and core activation to allow for proper functional mobility as indicated by patients Functional Deficits to allow pt to resume up/down 17 steps to enter 2nd floor condo  without increase in symptoms. [] Progressing: [] Met: [] Not Met: [] Adjusted  4. Patient will return to functional activities including grocery shopping, sleep thru night , position changes, prolonged standing x 15+ min, prolonged sitting prn for working as  without increased symptoms or restriction. [] Progressing: [] Met: [] Not Met: [] Adjusted      Overall Progression Towards Functional goals/ Treatment Progress Update:  [] Patient is progressing as expected towards functional goals listed. [x] Progression is slowed due to complexities/Impairments listed. [] Progression has been slowed due to co-morbidities.   [] Plan just implemented, too soon to assess goals progression <30days   [] Goals require adjustment due to lack of progress  [] Patient is not progressing as expected and requires additional follow up with physician  [] Other    Persisting Functional Limitations/Impairments:  []Sleeping [x]Sitting               [x]Standing [x]Transfers        [x]Walking [x]Kneeling               [x]Stairs [x]Squatting / bending   [x]ADLs []Reaching  [x]Lifting  [x]Housework  []Driving [x]Job related tasks  [x]Sports/Recreation []Other:        ASSESSMENT: will continue strengthening core and posterior chain; promote improved extension; patient is not wanting to explore the use of AD at this time  Treatment/Activity Tolerance:  [x] Patient able to complete tx [] Patient limited by fatigue  [] Patient limited by pain  [] Patient limited by other medical complications  [] Other:     Prognosis: [x] Good [] Fair  [] Poor    Patient Requires Follow-up: [x] Yes  [] No    Plan for next treatment session:continue stretching and strengthening with land visits  PLAN:  strength, ROM/flexibility, posture and body mechs, manual, MOC, HEP, pt education    Frequency/Duration:  2 days per week for   6 Weeks:  Interventions:  [x]  Therapeutic exercise including:strength, ROM, flexibility  [x]  NMR activation and proprioception including postural re-education  [x]  Manual therapy as indicated to include: IASTM, STM, PROM, Gr I-IV mobilizations, manipulation. [x]  Modalities as needed that may include: thermal agents, E-stim, Biofeedback, US, iontophoresis as indicated  [x]  Patient education on joint protection, postural re-education, activity modification, progression of HEP. Aquatic therapy    PLAN: See eval. PT 2x / week for 6 weeks. [x] Continue per plan of care [] Alter current plan (see comments)  [] Plan of care initiated [] Hold pending MD visit [] Discharge    Electronically signed by: Timbo Young PT  DPT    Note: If patient does not return for scheduled/ recommended follow up visits, this note will serve as a discharge from care along with most recent update on progress.

## 2022-11-09 ENCOUNTER — HOSPITAL ENCOUNTER (OUTPATIENT)
Dept: PHYSICAL THERAPY | Age: 72
Setting detail: THERAPIES SERIES
Discharge: HOME | End: 2022-11-09
Payer: COMMERCIAL

## 2022-11-09 PROCEDURE — 97110 THERAPEUTIC EXERCISES: CPT

## 2022-11-09 PROCEDURE — 97530 THERAPEUTIC ACTIVITIES: CPT

## 2022-11-09 NOTE — FLOWSHEET NOTE
[address] [facility] Physical Therapy  Phone: [phone]   Fax: [phone]    Physical Therapy Daily Treatment Note    Date:  2022     Patient Name:  Aurora Willard    :  1950  MRN: [id number]  Medical Diagnosis:  DDD (degenerative disc disease), lumbar [M51.36]  Spondylolisthesis of lumbar region [M43.16]  Treatment Diagnosis: antalgia, flexed trunk, R>L knee pain contributing to assymmetrical gait, flexibility and trunk ROM deficits, strength deficits of trunk >LE mm. B knee flex contractures. Flexion of spine/trunk relieves back symptoms                                          Insurance/Certification information:  PT Insurance Information: UMR ded met, no copay, no auth, # of visits TBD  Physician Information:  Stormy Chávez,* date of referral to PT: 22  Plan of care signed (Y/N): [x]  Yes []  No     Date of Patient follow up with Physician:      Progress Report: [x]  Yes   []  No     Date Range for reporting period:  Beginning: 10/8/2022  Endin22    Progress report due (10 Rx/or 30 days whichever is less): visit #10 or 79      Recertification due (POC duration/ or 90 days whichever is less): visit #12 or 22 (date)     Visit # Insurance Allowable Auth required? Date Range    TBD []  Yes  [x]  No NA         Latex Allergy:  [x]NO      []YES  Preferred Language for Healthcare:   [x]English       []other:    Functional Scale:           Date assessed:  ZAIN score = 18/50        10/8/22  FOTO score = 50, discharge goal 58     22  ZAIN =12/45         22    Pain level:  5/10     SUBJECTIVE:  feels that she is moving better overall; much easier to get in and out of bed. She strained her right leg the other day, so that is her primary pain today.  She says that the worst thing for her back is static standing, better if she can walk  OBJECTIVE: : walking without AD with moderate antalgia due to hip and knee weakness; stands and walks in a forward bent posture; AROM lumbar spine flex with inclinometer: 45 deg, ext with inclinometer 10 deg; right knee AROM in supine 0-, prone R knee flex to 83 deg; hip flex strength 4+/5, hip abd NT this date, but noted glut med weakness in standing, hip ext bilaterally 3+/5    RESTRICTIONS/PRECAUTIONS:  HTN, Knee OA - R>L - hoping to avoid TKRs. HAs, Lymphedema B LE- previous PT tx for it  - wears compression stockings, has lymphedema pump she uses 3-4x/wk. Exercises/Interventions:     Therapeutic Exercises (70101) Resistance / level Sets/sec Reps Notes   Sci fit L1  6 min S=22, arms = 7    IB   3 x 30 sec B    Step stretches  HS  Hip flex/knee flex   2 x 30\" B  2 x 30\" B Prone to prone on elbows   X 10 with 10 sec holds                       Prone (over 2 pillows) knee flex  Prone over 2 pillow hip ext   X 10 B  X 10 B    Standing hip abd at wall      Therapeutic Activities (97683)       Postural ed/computer ergonomics, take breaks - including short walking breaks      Patient ed   x Discussed heel prop with QS to improve knee ext on the right   Re-assessment for PN   x           Gait (65574)           Cables: mid rows standing  Shld ext  Shld add                    Neuromuscular Re-ed (62120)       Seated SB--AP, ML, circles each direction    Hip gliders, ext and abd B    Balance ex  SLR X 3 on airex    Partial plank at table with alt a/l lift  Partial plank at table with lumb mobility      AirEx in // bars  NBOS no hands, eyes closed  NBOS no hands with perturbations      SLS with hands on bars (on ground)      Manual Intervention (68419)       MET  As antionette      STM/MFR sacrum       Ant glides in prone bilateral hips and anterolateral glides, grade 3   x    Prone quad stretch B   x                         Pt. Education: 10/8/22 Gave pt tour of pool, explained how to use lockers, what to wear, that it would be in group setting, and showed pt aquatic equipment.   10/24: gait training with cane and rollator; did not reduce pain in her lumbar spine and patient does not want to use and AD     Modalities:     Pt. Education:  10/08/2022  -patient educated on diagnosis, prognosis and expectations for rehab  -all patient questions were answered  Pt ed re rationale and role of aquatics and OA at knees and back    Home Exercise Program:  Access Code: [email or web address]  URL: [employer]/  Date: 10/08/2022  Prepared by: Oziel Padron    Exercises  Supine Transversus Abdominis Bracing - Hands on Stomach - 2 x daily - 7 x weekly - 1 sets - 10 reps - 5 hold  Hooklying Single Knee to Chest Stretch - 2 x daily - 7 x weekly - 1 sets - 3-5 reps - 15-30 hold  Seated Table Hamstring Stretch - 3 x daily - 7 x weekly - 1-2 sets - 3-5 reps - 15-30 hold  Seated Correct Posture - 2-3 x daily - 7 x weekly - 1-2 sets - 10 reps - 5-10 min hold  Walking - 2-4 x daily - 7 x weekly - 1-10 min hold        Therapeutic Exercise and NMR EXR  [x] (67249) Provided verbal/tactile cueing for activities related to strengthening, flexibility, endurance, ROM for improvements in  [] LE / Lumbar: LE, proximal hip, and core control with self care, mobility, lifting, ambulation. [] UE / Cervical: cervical, postural, scapular, scapulothoracic and UE control with self care, reaching, carrying, lifting, house/yardwork, driving, computer work. [x] (66755) Provided verbal/tactile cueing for activities related to improving balance, coordination, kinesthetic sense, posture, motor skill, proprioception to assist with   [] LE / lumbar: LE, proximal hip, and core control in self care, mobility, lifting, ambulation and eccentric single leg control. [] UE / cervical: cervical, scapular, scapulothoracic and UE control with self care, reaching, carrying, lifting, house/yardwork, driving, computer work.    [x] (50892) Therapist is in constant attendance of 2 or more patients providing skilled therapy interventions, but not providing any significant amount of measurable one-on-one time to either patient, for improvements in  [] LE / lumbar: LE, proximal hip, and core control in self care, mobility, lifting, ambulation and eccentric single leg control. [] UE / cervical: cervical, scapular, scapulothoracic and UE control with self care, reaching, carrying, lifting, house/yardwork, driving, computer work. NMR and Therapeutic Activities:    [x] (40079 or 29990) Provided verbal/tactile cueing for activities related to improving balance, coordination, kinesthetic sense, posture, motor skill, proprioception and motor activation to allow for proper function of   [] LE: / Lumbar core, proximal hip and LE with self care and ADLs  [] UE / Cervical: cervical, postural, scapular, scapulothoracic and UE control with self care, carrying, lifting, driving, computer work. [x] (42606) Gait Re-education- Provided training and instruction to the patient for proper LE, core and proximal hip recruitment and positioning and eccentric body weight control with ambulation re-education including up and down stairs     Home Management Training / Self Care:  [] (44619) Provided self-care/home management training related to activities of daily living and compensatory training, and/or use of adaptive equipment for improvement with: ADLs and compensatory training, meal preparation, safety procedures and instruction in use of adaptive equipment, including bathing, grooming, dressing, personal hygiene, basic household cleaning and chores.      Home Exercise Program:    [] (93377) Reviewed/Progressed HEP activities related to strengthening, flexibility, endurance, ROM of   [] LE / Lumbar: core, proximal hip and LE for functional self-care, mobility, lifting and ambulation/stair navigation   [] UE / Cervical: cervical, postural, scapular, scapulothoracic and UE control with self care, reaching, carrying, lifting, house/yardwork, driving, computer work  [] (81039)Reviewed/Progressed HEP activities related to improving balance, coordination, kinesthetic sense, posture, motor skill, proprioception of   [] LE: core, proximal hip and LE for self care, mobility, lifting, and ambulation/stair navigation    [] UE / Cervical: cervical, postural,  scapular, scapulothoracic and UE control with self care, reaching, carrying, lifting, house/yardwork, driving, computer work    Manual Treatments:  PROM / STM / Oscillations-Mobs:  G-I, II, III, IV (PA's, Inf., Post.)  [] (53388) Provided manual therapy to mobilize LE, proximal hip and/or LS spine soft tissue/joints for the purpose of modulating pain, promoting relaxation,  increasing ROM, reducing/eliminating soft tissue swelling/inflammation/restriction, improving soft tissue extensibility and allowing for proper ROM for normal function with   [] LE / lumbar: self care, mobility, lifting and ambulation. [] UE / Cervical: self care, reaching, carrying, lifting, house/yardwork, driving, computer work. Modalities:  [] (42890) Vasopneumatic compression: Utilized vasopneumatic compression to decrease edema / swelling for the purpose of improving mobility and quad tone / recruitment which will allow for increased overall function including but not limited to self-care, transfers, ambulation, and ascending / descending stairs. Charges:  Timed Code Treatment Minutes: 47   Total Treatment Minutes: 47     [] EVAL - LOW (47582)   [] EVAL - MOD (13820)  [] EVAL - HIGH (76694)  [] RE-EVAL (93420)  [x] NS(45601) x  1     [] Ionto  [] NMR (53947) x       [] Vaso  [] Manual (83919) x       [] Ultrasound  [x] TA x   2     [] Mech Traction (74546)  [] Aquatic Therapy x     [] ES (un) (91444):   [] Home Management Training x  [] ES(attended) (79436)   [] Dry Needling 1-2 muscles (72396):  [] Dry Needling 3+ muscles (786179)  [] Group:      [] Other:       GOALS:  Patient stated goal:  less back pain.   Walk/ stand better  [x] Progressing: [] Met: [] Not Met: [] Adjusted    Therapist goals for Patient:   Short Term Goals: To be achieved in: 2 weeks  1. Independent in HEP and progression per patient tolerance, in order to prevent re-injury. [] Progressing: [x] Met: [] Not Met: [] Adjusted  2. Patient will have a decrease in pain to facilitate improvement in movement, function, and ADLs as indicated by improvement with respect to Functional Deficits. [] Progressing: [x] Met: [] Not Met: [] Adjusted    Long Term Goals: To be achieved in: 6 weeks  1. ZAIN functional survey score of </= 20% disability  to assist with reaching prior level of function. [] Progressing: [] Met: [] Not Met: [] Adjusted  2. Patient will demonstrate increased AROM  to [facility] [employer], to allow for proper joint functioning to allow pt to antionette good posture and ergonomics for working at [employer] for her  job, resume  standing upright prn for cooking, ADLs, reaching into kitchen cabinets, using good posture and body mechs without increase in symptoms. [x] Progressing: [] Met: [] Not Met: [] Adjusted  3. Patient will demonstrate increased Strength and core activation to allow for proper functional mobility as indicated by patients Functional Deficits to allow pt to resume up/down 17 steps to enter 2nd floor condo  without increase in symptoms. [x] Progressing: [] Met: [] Not Met: [] Adjusted  4. Patient will return to functional activities including grocery shopping, sleep thru night , position changes, prolonged standing x 15+ min, prolonged sitting prn for working as  without increased symptoms or restriction. [x] Progressing: [] Met: [] Not Met: [] Adjusted      Overall Progression Towards Functional goals/ Treatment Progress Update:  [] Patient is progressing as expected towards functional goals listed. [x] Progression is slowed due to complexities/Impairments listed. [] Progression has been slowed due to co-morbidities.   [] Plan just implemented, too soon to assess goals progression <30days   [] Goals require adjustment due to lack of progress  [] Patient is not progressing as expected and requires additional follow up with physician  [] Other    Persisting Functional Limitations/Impairments:  []Sleeping [x]Sitting               [x]Standing [x]Transfers        [x]Walking [x]Kneeling               [x]Stairs [x]Squatting / bending   [x]ADLs []Reaching  [x]Lifting  [x]Housework  []Driving [x]Job related tasks  [x]Sports/Recreation []Other:        ASSESSMENT: making some improvement, but core weakness and hip weakness continues; patient struggles in standing likely due to stenosis; continue to work on improving post chain strengthening as well as increasing mobility  Treatment/Activity Tolerance:  [x] Patient able to complete tx [] Patient limited by fatigue  [] Patient limited by pain  [] Patient limited by other medical complications  [] Other:     Prognosis: [x] Good [] Fair  [] Poor    Patient Requires Follow-up: [x] Yes  [] No    Plan for next treatment session:continue stretching and strengthening with land visits  PLAN:  strength, ROM/flexibility, posture and body mechs, manual, MOC, HEP, pt education    Frequency/Duration:  2 days per week for   6 Weeks:  Interventions:  [x]  Therapeutic exercise including:strength, ROM, flexibility  [x]  NMR activation and proprioception including postural re-education  [x]  Manual therapy as indicated to include: IASTM, STM, PROM, Gr I-IV mobilizations, manipulation. [x]  Modalities as needed that may include: thermal agents, E-stim, Biofeedback, US, iontophoresis as indicated  [x]  Patient education on joint protection, postural re-education, activity modification, progression of HEP. Aquatic therapy    PLAN: See eval. PT 2x / week for 6 weeks.    [x] Continue per plan of care [] Alter current plan (see comments)  [] Plan of care initiated [] Hold pending MD visit [] Discharge    Electronically signed by: Amy Marie PT DPT    Note: If patient does not return for scheduled/ recommended follow up visits, this note will serve as a discharge from care along with most recent update on progress.

## 2022-11-14 ENCOUNTER — OFFICE VISIT (OUTPATIENT)
Dept: INTERNAL MEDICINE CLINIC | Age: 72
End: 2022-11-14
Payer: COMMERCIAL

## 2022-11-14 ENCOUNTER — HOSPITAL ENCOUNTER (OUTPATIENT)
Dept: PHYSICAL THERAPY | Age: 72
Setting detail: THERAPIES SERIES
Discharge: HOME | End: 2022-11-14
Payer: COMMERCIAL

## 2022-11-14 VITALS
DIASTOLIC BLOOD PRESSURE: 78 MMHG | BODY MASS INDEX: 41.72 KG/M2 | SYSTOLIC BLOOD PRESSURE: 126 MMHG | HEIGHT: 67 IN | WEIGHT: 265.8 LBS | HEART RATE: 73 BPM | TEMPERATURE: 97.9 F | OXYGEN SATURATION: 100 %

## 2022-11-14 DIAGNOSIS — E55.9 VITAMIN D DEFICIENCY: ICD-10-CM

## 2022-11-14 DIAGNOSIS — I10 PRIMARY HYPERTENSION: ICD-10-CM

## 2022-11-14 DIAGNOSIS — Z53.9 PROCEDURE AND TREATMENT NOT CARRIED OUT, UNSPECIFIED REASON: Primary | ICD-10-CM

## 2022-11-14 PROCEDURE — 3074F SYST BP LT 130 MM HG: CPT | Performed by: HOSPITALIST

## 2022-11-14 PROCEDURE — 97112 NEUROMUSCULAR REEDUCATION: CPT

## 2022-11-14 PROCEDURE — 97110 THERAPEUTIC EXERCISES: CPT

## 2022-11-14 PROCEDURE — 3078F DIAST BP <80 MM HG: CPT | Performed by: HOSPITALIST

## 2022-11-14 PROCEDURE — 99214 OFFICE O/P EST MOD 30 MIN: CPT | Performed by: HOSPITALIST

## 2022-11-14 PROCEDURE — 1123F ACP DISCUSS/DSCN MKR DOCD: CPT | Performed by: HOSPITALIST

## 2022-11-14 RX ORDER — ASPIRIN 81 MG/1
TABLET ORAL
COMMUNITY
Start: 2022-05-31

## 2022-11-14 RX ORDER — LORAZEPAM 1 MG/1
1 TABLET ORAL DAILY PRN
Qty: 2 TABLET | Refills: 0 | Status: SHIPPED | OUTPATIENT
Start: 2022-11-14 | End: 2022-11-16

## 2022-11-14 ASSESSMENT — ENCOUNTER SYMPTOMS
ABDOMINAL PAIN: 0
COUGH: 0
CHEST TIGHTNESS: 0
DIARRHEA: 0
SORE THROAT: 0
NAUSEA: 0
VOICE CHANGE: 0
WHEEZING: 0
BACK PAIN: 1
TROUBLE SWALLOWING: 0
ABDOMINAL DISTENTION: 0
VOMITING: 0
SHORTNESS OF BREATH: 0
CONSTIPATION: 0
SINUS PRESSURE: 0
SINUS PAIN: 0
BLOOD IN STOOL: 0

## 2022-11-14 NOTE — FLOWSHEET NOTE
[address] [facility] Physical Therapy  Phone: [phone]   Fax: [phone]    Physical Therapy Daily Treatment Note    Date:  2022     Patient Name:  Mp Mckeon    :  1950  MRN: [id number]  Medical Diagnosis:  DDD (degenerative disc disease), lumbar [M51.36]  Spondylolisthesis of lumbar region [M43.16]  Treatment Diagnosis: antalgia, flexed trunk, R>L knee pain contributing to assymmetrical gait, flexibility and trunk ROM deficits, strength deficits of trunk >LE mm. B knee flex contractures. Flexion of spine/trunk relieves back symptoms                                          Insurance/Certification information:  PT Insurance Information: UMR ded met, no copay, no auth, # of visits TBD  Physician Information:  Daisy Martins,* date of referral to PT: 22  Plan of care signed (Y/N): [x]  Yes []  No     Date of Patient follow up with Physician:      Progress Report: [x]  Yes   []  No     Date Range for reporting period:  Beginning: 10/8/2022  Endin22    Progress report due (10 Rx/or 30 days whichever is less): visit #10 or 09/3/84      Recertification due (POC duration/ or 90 days whichever is less): visit #12 or 22 (date)     Visit # Insurance Allowable Auth required? Date Range    TBD []  Yes  [x]  No NA         Latex Allergy:  [x]NO      []YES  Preferred Language for Healthcare:   [x]English       []other:    Functional Scale:           Date assessed:  ZAIN score = 18/50        10/8/22  FOTO score = 50, discharge goal 58     22  ZAIN =12/45         22    Pain level:  5/10     SUBJECTIVE:  Is planning for an MRI soon. Pain is not really changing.  She struggles with standing and walking  OBJECTIVE: : walking without AD with moderate antalgia due to hip and knee weakness; stands and walks in a forward bent posture; AROM lumbar spine flex with inclinometer: 45 deg, ext with inclinometer 10 deg; right knee AROM in supine 0-, prone R knee flex to 83 deg; hip flex strength 4+/5, hip abd NT this date, but noted glut med weakness in standing, hip ext bilaterally 3+/5    RESTRICTIONS/PRECAUTIONS:  HTN, Knee OA - R>L - hoping to avoid TKRs. HAs, Lymphedema B LE- previous PT tx for it  - wears compression stockings, has lymphedema pump she uses 3-4x/wk. Exercises/Interventions:     Therapeutic Exercises (43421) Resistance / level Sets/sec Reps Notes   Sci fit L1  6 min S=22, arms = 7    IB   3 x 30 sec B    Step stretches  HS  Hip ext/knee flex   2 x 30\" B  2 x 30\" B Prone to prone on elbows   X 10 with 10 sec holds     Seated lumbar flex mob with ball   X 10                Prone (over 2 pillows) knee flex  Prone over 2 pillow hip ext   X 10 B  X 10 B    Standing hip abd at wall   X 10 B    Therapeutic Activities (46869)       Postural ed/computer ergonomics, take breaks - including short walking breaks      Patient ed   x Discussed heel prop with QS to improve knee ext on the right   Re-assessment for PN   x           Gait (58661)           Cables: mid rows standing  Shld ext  Shld add   3 pl  X 20                   Neuromuscular Re-ed (59411)       Seated SB--AP, ML, circles each direction    Hip gliders, ext and abd B    Balance ex  SLR X 3 on airex    Partial plank at table with alt a/l lift  Partial plank at table with lumb mobility   X 10   X 10    AirEx in // bars  NBOS no hands, eyes closed  NBOS no hands with perturbations      SLS with hands on bars (on ground)      Manual Intervention (24127)       MET  As antionette      STM/MFR sacrum       Ant glides in prone bilateral hips and anterolateral glides, grade 3   x    Prone quad stretch B   x                         Pt. Education: 10/8/22 Gave pt tour of pool, explained how to use lockers, what to wear, that it would be in group setting, and showed pt aquatic equipment.   10/24: gait training with cane and rollator; did not reduce pain in her lumbar spine and patient does not want to use and AD     Modalities:     Pt. Education:  10/08/2022  -patient educated on diagnosis, prognosis and expectations for rehab  -all patient questions were answered  Pt ed re rationale and role of aquatics and OA at knees and back    Home Exercise Program:  Access Code: [email or web address]  URL: [email or web address]  Date: 10/08/2022  Prepared by: Oziel Padron    Exercises  Supine Transversus Abdominis Bracing - Hands on Stomach - 2 x daily - 7 x weekly - 1 sets - 10 reps - 5 hold  Hooklying Single Knee to Chest Stretch - 2 x daily - 7 x weekly - 1 sets - 3-5 reps - 15-30 hold  Seated Table Hamstring Stretch - 3 x daily - 7 x weekly - 1-2 sets - 3-5 reps - 15-30 hold  Seated Correct Posture - 2-3 x daily - 7 x weekly - 1-2 sets - 10 reps - 5-10 min hold  Walking - 2-4 x daily - 7 x weekly - 1-10 min hold        Therapeutic Exercise and NMR EXR  [x] (62005) Provided verbal/tactile cueing for activities related to strengthening, flexibility, endurance, ROM for improvements in  [] LE / Lumbar: LE, proximal hip, and core control with self care, mobility, lifting, ambulation. [] UE / Cervical: cervical, postural, scapular, scapulothoracic and UE control with self care, reaching, carrying, lifting, house/yardwork, driving, computer work. [x] (87995) Provided verbal/tactile cueing for activities related to improving balance, coordination, kinesthetic sense, posture, motor skill, proprioception to assist with   [] LE / lumbar: LE, proximal hip, and core control in self care, mobility, lifting, ambulation and eccentric single leg control. [] UE / cervical: cervical, scapular, scapulothoracic and UE control with self care, reaching, carrying, lifting, house/yardwork, driving, computer work.    [x] (49650) Therapist is in constant attendance of 2 or more patients providing skilled therapy interventions, but not providing any significant amount of measurable one-on-one time to either patient, for improvements in  [] LE / lumbar: LE, proximal hip, and core control in self care, mobility, lifting, ambulation and eccentric single leg control. [] UE / cervical: cervical, scapular, scapulothoracic and UE control with self care, reaching, carrying, lifting, house/yardwork, driving, computer work. NMR and Therapeutic Activities:    [x] (31532 or 95591) Provided verbal/tactile cueing for activities related to improving balance, coordination, kinesthetic sense, posture, motor skill, proprioception and motor activation to allow for proper function of   [] LE: / Lumbar core, proximal hip and LE with self care and ADLs  [] UE / Cervical: cervical, postural, scapular, scapulothoracic and UE control with self care, carrying, lifting, driving, computer work. [x] (06088) Gait Re-education- Provided training and instruction to the patient for proper LE, core and proximal hip recruitment and positioning and eccentric body weight control with ambulation re-education including up and down stairs     Home Management Training / Self Care:  [] (04244) Provided self-care/home management training related to activities of daily living and compensatory training, and/or use of adaptive equipment for improvement with: ADLs and compensatory training, meal preparation, safety procedures and instruction in use of adaptive equipment, including bathing, grooming, dressing, personal hygiene, basic household cleaning and chores.      Home Exercise Program:    [] (82144) Reviewed/Progressed HEP activities related to strengthening, flexibility, endurance, ROM of   [] LE / Lumbar: core, proximal hip and LE for functional self-care, mobility, lifting and ambulation/stair navigation   [] UE / Cervical: cervical, postural, scapular, scapulothoracic and UE control with self care, reaching, carrying, lifting, house/yardwork, driving, computer work  [] (56799)Reviewed/Progressed HEP activities related to improving balance, coordination, kinesthetic sense, posture, motor skill, proprioception of   [] LE: core, proximal hip and LE for self care, mobility, lifting, and ambulation/stair navigation    [] UE / Cervical: cervical, postural,  scapular, scapulothoracic and UE control with self care, reaching, carrying, lifting, house/yardwork, driving, computer work    Manual Treatments:  PROM / STM / Oscillations-Mobs:  G-I, II, III, IV (PA's, Inf., Post.)  [] (18170) Provided manual therapy to mobilize LE, proximal hip and/or LS spine soft tissue/joints for the purpose of modulating pain, promoting relaxation,  increasing ROM, reducing/eliminating soft tissue swelling/inflammation/restriction, improving soft tissue extensibility and allowing for proper ROM for normal function with   [] LE / lumbar: self care, mobility, lifting and ambulation. [] UE / Cervical: self care, reaching, carrying, lifting, house/yardwork, driving, computer work. Modalities:  [] (74018) Vasopneumatic compression: Utilized vasopneumatic compression to decrease edema / swelling for the purpose of improving mobility and quad tone / recruitment which will allow for increased overall function including but not limited to self-care, transfers, ambulation, and ascending / descending stairs. Charges:  Timed Code Treatment Minutes: 45   Total Treatment Minutes: 45     [] EVAL - LOW (83222)   [] EVAL - MOD (15969)  [] EVAL - HIGH (34801)  [] RE-EVAL (05330)  [x] NL(78013) x  2     [] Ionto  [x] NMR (03732) x  1     [] Vaso  [] Manual (21772) x       [] Ultrasound  [] TA x   2     [] Mech Traction (55788)  [] Aquatic Therapy x     [] ES (un) (53894):   [] Home Management Training x  [] ES(attended) (45183)   [] Dry Needling 1-2 muscles (65323):  [] Dry Needling 3+ muscles (150481)  [] Group:      [] Other:       GOALS:  Patient stated goal:  less back pain. Walk/ stand better  [x] Progressing: [] Met: [] Not Met: [] Adjusted    Therapist goals for Patient:   Short Term Goals:  To be achieved in: 2 weeks  1. Independent in HEP and progression per patient tolerance, in order to prevent re-injury. [] Progressing: [x] Met: [] Not Met: [] Adjusted  2. Patient will have a decrease in pain to facilitate improvement in movement, function, and ADLs as indicated by improvement with respect to Functional Deficits. [] Progressing: [x] Met: [] Not Met: [] Adjusted    Long Term Goals: To be achieved in: 6 weeks  1. ZAIN functional survey score of </= 20% disability  to assist with reaching prior level of function. [] Progressing: [] Met: [] Not Met: [] Adjusted  2. Patient will demonstrate increased AROM  to [facility], to allow for proper joint functioning to allow pt to antionette good posture and ergonomics for working at [employer] for her  job, resume  standing upright prn for cooking, ADLs, reaching into kitchen cabinets, using good posture and body mechs without increase in symptoms. [x] Progressing: [] Met: [] Not Met: [] Adjusted  3. Patient will demonstrate increased Strength and core activation to allow for proper functional mobility as indicated by patients Functional Deficits to allow pt to resume up/down 17 steps to enter 2nd floor condo  without increase in symptoms. [x] Progressing: [] Met: [] Not Met: [] Adjusted  4. Patient will return to functional activities including grocery shopping, sleep thru night , position changes, prolonged standing x 15+ min, prolonged sitting prn for working as  without increased symptoms or restriction. [x] Progressing: [] Met: [] Not Met: [] Adjusted      Overall Progression Towards Functional goals/ Treatment Progress Update:  [] Patient is progressing as expected towards functional goals listed. [x] Progression is slowed due to complexities/Impairments listed. [] Progression has been slowed due to co-morbidities.   [] Plan just implemented, too soon to assess goals progression <30days   [] Goals require adjustment due to lack of progress  [] Patient is not progressing as expected and requires additional follow up with physician  [] Other    Persisting Functional Limitations/Impairments:  []Sleeping [x]Sitting               [x]Standing [x]Transfers        [x]Walking [x]Kneeling               [x]Stairs [x]Squatting / bending   [x]ADLs []Reaching  [x]Lifting  [x]Housework  []Driving [x]Job related tasks  [x]Sports/Recreation []Other:        ASSESSMENT:  patient struggles in standing likely due to stenosis; continue to work on improving post chain strengthening as well as increasing mobility  Treatment/Activity Tolerance:  [x] Patient able to complete tx [] Patient limited by fatigue  [] Patient limited by pain  [] Patient limited by other medical complications  [] Other:     Prognosis: [x] Good [] Fair  [] Poor    Patient Requires Follow-up: [x] Yes  [] No    Plan for next treatment session:continue stretching and strengthening with land visits  PLAN:  strength, ROM/flexibility, posture and body mechs, manual, MOC, HEP, pt education    Frequency/Duration:  2 days per week for   6 Weeks:  Interventions:  [x]  Therapeutic exercise including:strength, ROM, flexibility  [x]  NMR activation and proprioception including postural re-education  [x]  Manual therapy as indicated to include: IASTM, STM, PROM, Gr I-IV mobilizations, manipulation. [x]  Modalities as needed that may include: thermal agents, E-stim, Biofeedback, US, iontophoresis as indicated  [x]  Patient education on joint protection, postural re-education, activity modification, progression of HEP. Aquatic therapy    PLAN: See eval. PT 2x / week for 6 weeks. [x] Continue per plan of care [] Alter current plan (see comments)  [] Plan of care initiated [] Hold pending MD visit [] Discharge    Electronically signed by: Ozzy Tobin, PT  DPT    Note: If patient does not return for scheduled/ recommended follow up visits, this note will serve as a discharge from care along with most recent update on progress.

## 2022-11-14 NOTE — PROGRESS NOTES
[facility] Internal Medicine  Follow-up visit   2022    Roberta Washington (:  1950) is a 67 y.o. female, here for follow-up:    Chief Complaint   Patient presents with    Other     Blood work follow up        HPI  Ms Kalli Santiago is a 51-year-old female with relatively well-controlled problems. Essential hypertension: Patient's blood pressure is very well controlled on losartan 100 mg daily. She is using this regularly without side effect. 2.  Diarrhea: Well controlled with Questran 4 g packets. Is a packet every morning. 3.  Right Knee arthritis/meniscal degeneration: Patient initially saw Dr. Racheal Werner in early  steroid injections. She most recently has been seeing Dr. Izzy Sinclair.  Deceived 3 Synvisc injections into the right knee. She has not had adequate relief from this. Currently receiving physical therapy. She uses Tylenol 3 times daily. Was prescribed meloxicam, but has not had this medication filled because she was concerned about the side effect of higher blood pressures. 4.  Degenerative Joint disease lumbar spine: The patient has been having work-up of this. She had an x-ray done on . She has an upcoming MRI to investigate further. Contemplating having lumbar epidural steroid injections. In order to get the MRI, I have ordered some low-dose Ativan for her to use on the day of the imagining. 5.  Vitamin D deficiency: Patient's vitamin D level on 2022 was 21.1. I have recommended that she use 5000 international units of vitamin D daily. We will recheck at the next visit. ROS  Review of Systems   Constitutional:  Negative for activity change, appetite change, chills, diaphoresis, fatigue, fever and unexpected weight change. HENT:  Negative for sinus pressure, sinus pain, sore throat, trouble swallowing and voice change. Eyes:  Negative for visual disturbance. Respiratory:  Negative for cough, chest tightness, shortness of breath and wheezing. Cardiovascular:  Negative for chest pain, palpitations and leg swelling. Gastrointestinal:  Negative for abdominal distention, abdominal pain, blood in stool, constipation, diarrhea, nausea and vomiting. Endocrine: Negative for polydipsia and polyphagia. Genitourinary:  Negative for decreased urine volume, difficulty urinating, dysuria and urgency. Musculoskeletal:  Positive for arthralgias (R kneee) and back pain. Negative for gait problem, joint swelling and myalgias. Neurological:  Negative for dizziness, seizures, syncope, light-headedness and headaches. Psychiatric/Behavioral:  Negative for agitation, behavioral problems, confusion and suicidal ideas. HISTORIES  Current Outpatient Medications on File Prior to Visit   Medication Sig Dispense Refill    aspirin 81 MG EC tablet       cholestyramine (QUESTRAN) 4 g packet USE ONE PACKET IN WATER 2 TIMES DAILY 180 packet 1    meloxicam (MOBIC) 15 MG tablet Take 1 tablet by mouth daily as needed for Pain 30 tablet 1    losartan (COZAAR) 100 MG tablet TAKE 1 TABLET BY MOUTH EVERY DAY 90 tablet 3    omeprazole (PRILOSEC) 40 MG delayed release capsule        No current facility-administered medications on file prior to visit.       Allergies   Allergen Reactions    Ciprofloxacin      G.I. Upset     Past Medical History:   Diagnosis Date    Acquired lymphedema of lower extremity     Esophageal reflux     Flushing     HTN (hypertension)     Idiopathic chronic venous hypertension of left lower extremity with inflammation     Labyrinthitis, unspecified     Lumbar disc disease     traumatic fall    Migraine     Pain in limb     left leg    Screening mammogram 02/29/2008    (Both)Negative     Patient Active Problem List   Diagnosis    Hypertension    Migraine headache    Edema    Loose stools    Microhematuria    Lightheadedness    Vitamin D deficiency    Esophageal dysphagia    Degeneration of meniscus of right knee    Degenerative arthritis of right knee Chronic low back pain    Tinnitus of left ear     Past Surgical History:   Procedure Laterality Date    APPENDECTOMY      CHOLECYSTECTOMY      COLONOSCOPY  11/2014    negative - Dr Anuja Noguera ENDOSCOPY  09/2019    Small sliding hiatal hernia     Social History     Socioeconomic History    Marital status: Single     Spouse name: Not on file    Number of children: Not on file    Years of education: Not on file    Highest education level: Not on file   Occupational History    Not on file   Tobacco Use    Smoking status: Never    Smokeless tobacco: Never   Substance and Sexual Activity    Alcohol use: No    Drug use: No    Sexual activity: Not on file   Other Topics Concern    Not on file   Social History Narrative    Not on file     Social Determinants of Health     Financial Resource Strain: Low Risk     Difficulty of Paying Living Expenses: Not hard at all   Food Insecurity: No Food Insecurity    Worried About Running Out of Food in the Last Year: Never true    Ran Out of Food in the Last Year: Never true   Transportation Needs: Not on file   Physical Activity: Inactive    Days of Exercise per Week: 0 days    Minutes of Exercise per Session: 0 min   Stress: Not on file   Social Connections: Not on file   Intimate Partner Violence: Not At Risk    Fear of Current or Ex-Partner: No    Emotionally Abused: No    Physically Abused: No    Sexually Abused: No   Housing Stability: Not on file      Family History   Problem Relation Age of Onset    Cancer Mother         skin, SCC       PE  Vitals:    11/14/22 1021   BP: 126/78   Site: Left Upper Arm   Position: Sitting   Pulse: 73   Temp: 97.9 °F (36.6 °C)   SpO2: 100%   Weight: 265 lb 12.8 oz (120.6 kg)   Height: 5' 7\" (1.702 m)     Estimated body mass index is 41.63 kg/m² as calculated from the following:    Height as of this encounter: 5' 7\" (1.702 m). Weight as of this encounter: 265 lb 12.8 oz (120.6 kg). Physical Exam  Vitals reviewed. Constitutional:       General: She is not in acute distress. Appearance: Normal appearance. HENT:      Head: Normocephalic and atraumatic. Mouth/Throat:      Pharynx: Oropharynx is clear. Eyes:      Conjunctiva/sclera: Conjunctivae normal.      Pupils: Pupils are equal, round, and reactive to light. Cardiovascular:      Rate and Rhythm: Normal rate and regular rhythm. Pulses: Normal pulses. Heart sounds: Normal heart sounds. Pulmonary:      Effort: Pulmonary effort is normal. No respiratory distress. Breath sounds: Normal breath sounds. No wheezing or rales. Abdominal:      Palpations: Abdomen is soft. Tenderness: There is no abdominal tenderness. There is no rebound. Musculoskeletal:         General: No signs of injury. Normal range of motion. Cervical back: Normal range of motion and neck supple. Skin:     General: Skin is warm and dry. Coloration: Skin is not jaundiced. Neurological:      General: No focal deficit present. Mental Status: She is alert and oriented to person, place, and time. Psychiatric:         Behavior: Behavior normal.         Thought Content: Thought content normal.         Judgment: Judgment normal.         ASSESSMENT/ PLAN:  1. Procedure and treatment not carried out, unspecified reason  -Ordered 2 doses of lorazepam for her to use as needed day of MRI. She will have a . - LORazepam (ATIVAN) 1 MG tablet; Take 1 tablet by mouth daily as needed for Anxiety for up to 2 days. Dispense: 2 tablet; Refill: 0    2. Vitamin D deficiency  - Vitamin D 5000 IU daily Recheck next Visit. - Vitamin D 25 Hydroxy; Future    3. Primary hypertension  - Well controlled. - Comprehensive Metabolic Panel; Future  - CBC;  Future     Orders Placed This Encounter   Procedures    Comprehensive Metabolic Panel    CBC    Vitamin D 25 Hydroxy      Orders Placed This Encounter   Medications    LORazepam (ATIVAN) 1 MG tablet     Sig: Take 1

## 2022-11-16 ENCOUNTER — HOSPITAL ENCOUNTER (OUTPATIENT)
Dept: PHYSICAL THERAPY | Age: 72
Setting detail: THERAPIES SERIES
Discharge: HOME | End: 2022-11-16
Payer: COMMERCIAL

## 2022-11-16 PROCEDURE — 97112 NEUROMUSCULAR REEDUCATION: CPT

## 2022-11-16 PROCEDURE — 97110 THERAPEUTIC EXERCISES: CPT

## 2022-11-16 NOTE — FLOWSHEET NOTE
[facility]  Phone: [phone]   Fax: [phone]    Physical Therapy Daily Treatment Note    Date:  2022     Patient Name:  Lou Ignacio    :  1950  MRN: [id number]  Medical Diagnosis:  DDD (degenerative disc disease), lumbar [M51.36]  Spondylolisthesis of lumbar region [M43.16]  Treatment Diagnosis: antalgia, flexed trunk, R>L knee pain contributing to assymmetrical gait, flexibility and trunk ROM deficits, strength deficits of trunk >LE mm. B knee flex contractures. Flexion of spine/trunk relieves back symptoms                                          Insurance/Certification information:  PT Insurance Information: UMR ded met, no copay, no auth, # of visits TBD  Physician Information:  Harman Granados,* date of referral to PT: 22  Plan of care signed (Y/N): [x]  Yes []  No     Date of Patient follow up with Physician:      Progress Report: [x]  Yes   []  No     Date Range for reporting period:  Beginning: 10/8/2022  Endin22    Progress report due (10 Rx/or 30 days whichever is less): visit #10 or 25/3/73      Recertification due (POC duration/ or 90 days whichever is less): visit #12 or 22 (date)     Visit # Insurance Allowable Auth required?  Date Range   10/12 TBD []  Yes  [x]  No NA         Latex Allergy:  [x]NO      []YES  Preferred Language for Healthcare:   [x]English       []other:    Functional Scale:           Date assessed:  ZAIN score = 18/50        10/8/22  FOTO score = 50, discharge goal 58     22  ZAIN =12/45         22    Pain level:  5/10     SUBJECTIVE:    OBJECTIVE: : walking without AD with moderate antalgia due to hip and knee weakness; stands and walks in a forward bent posture; AROM lumbar spine flex with inclinometer: 45 deg, ext with inclinometer 10 deg; right knee AROM in supine 0-, prone R knee flex to 83 deg; hip flex strength 4+/5, hip abd NT this date, but noted glut med weakness in standing, hip ext bilaterally 3+/5    RESTRICTIONS/PRECAUTIONS:  HTN, Knee OA - R>L - hoping to avoid TKRs. HAs, Lymphedema B LE- previous PT tx for it  - wears compression stockings, has lymphedema pump she uses 3-4x/wk. Exercises/Interventions:     Therapeutic Exercises (57017) Resistance / level Sets/sec Reps Notes   Sci fit L1  7 min S=22, arms = 7    IB   3 x 30 sec B    Step stretches  HS  Hip ext/knee flex  Hip add stretch at wall   2 x 30\" B  2 x 30\" B  5 x 10\" Prone to prone on elbows   X 10 with 10 sec holds     Seated lumbar flex mob with ball   X 10                Prone (over 2 pillows) knee flex  Prone over 2 pillow hip ext      Standing hip abd at wall   X 10 B    Therapeutic Activities (12792)       Postural ed/computer ergonomics, take breaks - including short walking breaks      Patient ed   Re-assessment for PN                        Cables: mid rows standing  Shld ext  Shld add   3 pl  X 20     Standing ab lift off wall   2 x 10    Wall posture stretch   X 10 with 10 sec hold    Neuromuscular Re-ed (93044)       Seated SB--AP, ML, circles each direction    Hip gliders, ext and abd B    Balance ex  SLR X 3 on airex    Partial plank at table with alt a/l lift  Partial plank at table with lumb mobility   X 10   X 10    AirEx in // bars  NBOS no hands, eyes closed  NBOS no hands with perturbations   5 x 10     SLS with hands on bars (on ground)      Manual Intervention (27996)       MET  As antionette      STM/MFR sacrum       Ant glides in prone bilateral hips and anterolateral glides, grade 3      Prone quad stretch B                           Pt. Education: 10/8/22 Gave pt tour of pool, explained how to use lockers, what to wear, that it would be in group setting, and showed pt aquatic equipment.   10/24: gait training with cane and rollator; did not reduce pain in her lumbar spine and patient does not want to use and AD     Modalities:     Pt. Education:  10/08/2022  -patient educated on diagnosis, prognosis and expectations for rehab  -all patient questions were answered  Pt ed re rationale and role of aquatics and OA at knees and back    Home Exercise Program:  Access Code: [email or web address]  URL: [email or web address]  Date: 10/08/2022  Prepared by: Marciano Kern    Exercises  Supine Transversus Abdominis Bracing - Hands on Stomach - 2 x daily - 7 x weekly - 1 sets - 10 reps - 5 hold  Hooklying Single Knee to Chest Stretch - 2 x daily - 7 x weekly - 1 sets - 3-5 reps - 15-30 hold  Seated Table Hamstring Stretch - 3 x daily - 7 x weekly - 1-2 sets - 3-5 reps - 15-30 hold  Seated Correct Posture - 2-3 x daily - 7 x weekly - 1-2 sets - 10 reps - 5-10 min hold  Walking - 2-4 x daily - 7 x weekly - 1-10 min hold        Therapeutic Exercise and NMR EXR  [x] (19993) Provided verbal/tactile cueing for activities related to strengthening, flexibility, endurance, ROM for improvements in  [] LE / Lumbar: LE, proximal hip, and core control with self care, mobility, lifting, ambulation. [] UE / Cervical: cervical, postural, scapular, scapulothoracic and UE control with self care, reaching, carrying, lifting, house/yardwork, driving, computer work. [x] (80304) Provided verbal/tactile cueing for activities related to improving balance, coordination, kinesthetic sense, posture, motor skill, proprioception to assist with   [] LE / lumbar: LE, proximal hip, and core control in self care, mobility, lifting, ambulation and eccentric single leg control. [] UE / cervical: cervical, scapular, scapulothoracic and UE control with self care, reaching, carrying, lifting, house/yardwork, driving, computer work.    [x] (94745) Therapist is in constant attendance of 2 or more patients providing skilled therapy interventions, but not providing any significant amount of measurable one-on-one time to either patient, for improvements in  [] LE / lumbar: LE, proximal hip, and core control in self care, mobility, lifting, ambulation and eccentric single leg control. [] UE / cervical: cervical, scapular, scapulothoracic and UE control with self care, reaching, carrying, lifting, house/yardwork, driving, computer work. NMR and Therapeutic Activities:    [x] (78265 or 45795) Provided verbal/tactile cueing for activities related to improving balance, coordination, kinesthetic sense, posture, motor skill, proprioception and motor activation to allow for proper function of   [] LE: / Lumbar core, proximal hip and LE with self care and ADLs  [] UE / Cervical: cervical, postural, scapular, scapulothoracic and UE control with self care, carrying, lifting, driving, computer work. [x] (12513) Gait Re-education- Provided training and instruction to the patient for proper LE, core and proximal hip recruitment and positioning and eccentric body weight control with ambulation re-education including up and down stairs     Home Management Training / Self Care:  [] (95037) Provided self-care/home management training related to activities of daily living and compensatory training, and/or use of adaptive equipment for improvement with: ADLs and compensatory training, meal preparation, safety procedures and instruction in use of adaptive equipment, including bathing, grooming, dressing, personal hygiene, basic household cleaning and chores.      Home Exercise Program:    [] (50554) Reviewed/Progressed HEP activities related to strengthening, flexibility, endurance, ROM of   [] LE / Lumbar: core, proximal hip and LE for functional self-care, mobility, lifting and ambulation/stair navigation   [] UE / Cervical: cervical, postural, scapular, scapulothoracic and UE control with self care, reaching, carrying, lifting, house/yardwork, driving, computer work  [] (96695)Reviewed/Progressed HEP activities related to improving balance, coordination, kinesthetic sense, posture, motor skill, proprioception of   [] LE: core, proximal hip and LE for self care, mobility, lifting, and ambulation/stair navigation    [] UE / Cervical: cervical, postural,  scapular, scapulothoracic and UE control with self care, reaching, carrying, lifting, house/yardwork, driving, computer work    Manual Treatments:  PROM / STM / Oscillations-Mobs:  G-I, II, III, IV (PA's, Inf., Post.)  [] (72598) Provided manual therapy to mobilize LE, proximal hip and/or LS spine soft tissue/joints for the purpose of modulating pain, promoting relaxation,  increasing ROM, reducing/eliminating soft tissue swelling/inflammation/restriction, improving soft tissue extensibility and allowing for proper ROM for normal function with   [] LE / lumbar: self care, mobility, lifting and ambulation. [] UE / Cervical: self care, reaching, carrying, lifting, house/yardwork, driving, computer work. Modalities:  [] (57398) Vasopneumatic compression: Utilized vasopneumatic compression to decrease edema / swelling for the purpose of improving mobility and quad tone / recruitment which will allow for increased overall function including but not limited to self-care, transfers, ambulation, and ascending / descending stairs. Charges:  Timed Code Treatment Minutes: 45   Total Treatment Minutes: 45     [] EVAL - LOW (87609)   [] EVAL - MOD (58021)  [] EVAL - HIGH (30966)  [] RE-EVAL (62762)  [x] SB(53294) x  2     [] Ionto  [x] NMR (97331) x  1     [] Vaso  [] Manual (53049) x       [] Ultrasound  [] TA x   2     [] [facility]  [] Aquatic Therapy x     [] ES (un) (17521):   [] Home Management Training x  [] ES(attended) (38555)   [] Dry Needling 1-2 muscles (76776):  [] Dry Needling 3+ muscles (296090)  [] Group:      [] Other:       GOALS:  Patient stated goal:  less back pain. Walk/ stand better  [x] Progressing: [] Met: [] Not Met: [] Adjusted    Therapist goals for Patient:   Short Term Goals: To be achieved in: 2 weeks  1.  Independent in HEP and progression per patient tolerance, in order to prevent re-injury. [] Progressing: [x] Met: [] Not Met: [] Adjusted  2. Patient will have a decrease in pain to facilitate improvement in movement, function, and ADLs as indicated by improvement with respect to Functional Deficits. [] Progressing: [x] Met: [] Not Met: [] Adjusted    Long Term Goals: To be achieved in: 6 weeks  1. ZAIN functional survey score of </= 20% disability  to assist with reaching prior level of function. [] Progressing: [] Met: [] Not Met: [] Adjusted  2. Patient will demonstrate increased AROM  to [facility], to allow for proper joint functioning to allow pt to antionette good posture and ergonomics for working at [employer] for her  job, resume  standing upright prn for cooking, ADLs, reaching into kitchen cabinets, using good posture and body mechs without increase in symptoms. [x] Progressing: [] Met: [] Not Met: [] Adjusted  3. Patient will demonstrate increased Strength and core activation to allow for proper functional mobility as indicated by patients Functional Deficits to allow pt to resume up/down 17 steps to enter 2nd floor condo  without increase in symptoms. [x] Progressing: [] Met: [] Not Met: [] Adjusted  4. Patient will return to functional activities including grocery shopping, sleep thru night , position changes, prolonged standing x 15+ min, prolonged sitting prn for working as  without increased symptoms or restriction. [x] Progressing: [] Met: [] Not Met: [] Adjusted      Overall Progression Towards Functional goals/ Treatment Progress Update:  [] Patient is progressing as expected towards functional goals listed. [x] Progression is slowed due to complexities/Impairments listed. [] Progression has been slowed due to co-morbidities.   [] Plan just implemented, too soon to assess goals progression <30days   [] Goals require adjustment due to lack of progress  [] Patient is not progressing as expected and requires additional follow up with physician  [] Other    Persisting Functional Limitations/Impairments:  []Sleeping [x]Sitting               [x]Standing [x]Transfers        [x]Walking [x]Kneeling               [x]Stairs [x]Squatting / bending   [x]ADLs []Reaching  [x]Lifting  [x]Housework  []Driving [x]Job related tasks  [x]Sports/Recreation []Other:        ASSESSMENT:  patient struggles in standing likely due to stenosis; continue to work on improving post chain strengthening as well as increasing mobility  Treatment/Activity Tolerance:  [x] Patient able to complete tx [] Patient limited by fatigue  [] Patient limited by pain  [] Patient limited by other medical complications  [] Other:     Prognosis: [x] Good [] Fair  [] Poor    Patient Requires Follow-up: [x] Yes  [] No    Plan for next treatment session:continue stretching and strengthening with land visits  PLAN:  strength, ROM/flexibility, posture and body mechs, manual, MOC, HEP, pt education    Frequency/Duration:  2 days per week for   6 Weeks:  Interventions:  [x]  Therapeutic exercise including:strength, ROM, flexibility  [x]  NMR activation and proprioception including postural re-education  [x]  Manual therapy as indicated to include: IASTM, STM, PROM, Gr I-IV mobilizations, manipulation. [x]  Modalities as needed that may include: thermal agents, E-stim, Biofeedback, US, iontophoresis as indicated  [x]  Patient education on joint protection, postural re-education, activity modification, progression of HEP. Aquatic therapy    PLAN: See ladarius. PT 2x / week for 6 weeks. [x] Continue per plan of care [] Alter current plan (see comments)  [] Plan of care initiated [] Hold pending MD visit [] Discharge    Electronically signed by: Yelena Galo PT  DPT    Note: If patient does not return for scheduled/ recommended follow up visits, this note will serve as a discharge from care along with most recent update on progress.

## 2022-11-26 ENCOUNTER — HOSPITAL ENCOUNTER (OUTPATIENT)
Dept: PHYSICAL THERAPY | Age: 72
Setting detail: THERAPIES SERIES
Discharge: HOME | End: 2022-11-26
Payer: COMMERCIAL

## 2022-11-26 PROCEDURE — 97530 THERAPEUTIC ACTIVITIES: CPT

## 2022-11-26 PROCEDURE — 97112 NEUROMUSCULAR REEDUCATION: CPT

## 2022-11-26 NOTE — FLOWSHEET NOTE
[facility] Physical Therapy  Phone: [phone]   Fax: [phone]    Physical Therapy Daily Treatment Note    Date:  2022     Patient Name:  Davina Mcclain    :  1950  MRN: [id number]  Medical Diagnosis:  DDD (degenerative disc disease), lumbar [M51.36]  Spondylolisthesis of lumbar region [M43.16]  Treatment Diagnosis: antalgia, flexed trunk, R>L knee pain contributing to assymmetrical gait, flexibility and trunk ROM deficits, strength deficits of trunk >LE mm. B knee flex contractures. Flexion of spine/trunk relieves back symptoms                                          Insurance/Certification information:  PT Insurance Information: [employer] ded met, no copay, no auth, # of visits TBD  Physician Information:  Dorothea Hogan,* date of referral to PT: 22  Plan of care signed (Y/N): [x]  Yes []  No     Date of Patient follow up with Physician:      Progress Report: [x]  Yes   []  No     Date Range for reporting period:  Beginning: 10/8/2022  Endin22    Progress report due (10 Rx/or 30 days whichever is less): visit #10 or 26/3/54      Recertification due (POC duration/ or 90 days whichever is less): visit #12 or 22 (date)     Visit # Insurance Allowable Auth required? Date Range    TBD []  Yes  [x]  No NA         Latex Allergy:  [x]NO      []YES  Preferred Language for Healthcare:   [x]English       []other:    Functional Scale:           Date assessed:  ZAIN score = 18/50        10/8/22  FOTO score = 50, discharge goal 58     22  ZAIN =12/45         22    Pain level:  5/10     SUBJECTIVE:  no new complaints today. Says her back isn't bothering her. She is having some right groin pain at this point from when she injured it several weeks ago. Has not yet scheduled her MRI, but plans to. Is considering whether she would like PT extended.   OBJECTIVE: : walking without AD with moderate antalgia due to hip and knee weakness; stands and walks in a forward bent posture; AROM lumbar spine flex with inclinometer: 45 deg, ext with inclinometer 10 deg; right knee AROM in supine 0-, prone R knee flex to 83 deg; hip flex strength 4+/5, hip abd NT this date, but noted glut med weakness in standing, hip ext bilaterally 3+/5    RESTRICTIONS/PRECAUTIONS:  HTN, Knee OA - R>L - hoping to avoid TKRs. HAs, Lymphedema B LE- previous PT tx for it  - wears compression stockings, has lymphedema pump she uses 3-4x/wk.       Exercises/Interventions:     Therapeutic Exercises (12014) Resistance / level Sets/sec Reps Notes   Sci fit L1  8 min S=22, arms = 7    IB   3 x 30 sec B    Step stretches  HS  Hip ext/knee flex  Hip add stretch at wall   2 x 30\" B  2 x 30\" B  5 x 10\" Prone to prone on elbows        Seated lumbar flex mob with ball   X 10                Prone (over 2 pillows) knee flex  Prone over 2 pillow hip ext      Standing hip abd at wall   X 10 B    Therapeutic Activities (70914)       Postural ed/computer ergonomics, take breaks - including short walking breaks      Patient ed   Re-assessment for PN                    Standing ball on wall with squats X 15    Cables: mid rows standing  Shld ext  Shld add   3 pl  X 20     Standing ab lift off wall   2 x 10    Wall posture stretch   X 10 with 10 sec hold    Neuromuscular Re-ed (45933)       Seated SB--AP, ML, circles each direction    Hip gliders, ext and abd B    Balance ex  SLR X 3 on airex X 10 each B    Partial plank at table with alt a/l lift  Partial plank at table with lumb mobility   X 10   X 10    AirEx in // bars  NBOS no hands, eyes closed  Normal stance: ball OH, rot       5#  5 x 10  X 10 each B     SLS with hands on bars (on ground)      Manual Intervention (85169)       MET  As antionette      STM/MFR sacrum       Ant glides in prone bilateral hips and anterolateral glides, grade 3      Prone quad stretch B                           Pt. Education: 10/8/22 Gave pt tour of pool, explained how to use lockers, what to wear, that it would be in group setting, and showed pt aquatic equipment. 10/24: gait training with cane and rollator; did not reduce pain in her lumbar spine and patient does not want to use and AD     Modalities:     Pt. Education:  10/08/2022  -patient educated on diagnosis, prognosis and expectations for rehab  -all patient questions were answered  Pt ed re rationale and role of aquatics and OA at knees and back    Home Exercise Program:  Access Code: [email or web address]  URL: [email or web address]  Date: 10/08/2022  Prepared by: Jeff Dunham    Exercises  Supine Transversus Abdominis Bracing - Hands on Stomach - 2 x daily - 7 x weekly - 1 sets - 10 reps - 5 hold  Hooklying Single Knee to Chest Stretch - 2 x daily - 7 x weekly - 1 sets - 3-5 reps - 15-30 hold  Seated Table Hamstring Stretch - 3 x daily - 7 x weekly - 1-2 sets - 3-5 reps - 15-30 hold  Seated Correct Posture - 2-3 x daily - 7 x weekly - 1-2 sets - 10 reps - 5-10 min hold  Walking - 2-4 x daily - 7 x weekly - 1-10 min hold        Therapeutic Exercise and NMR EXR  [x] (09487) Provided verbal/tactile cueing for activities related to strengthening, flexibility, endurance, ROM for improvements in  [] LE / Lumbar: LE, proximal hip, and core control with self care, mobility, lifting, ambulation. [] UE / Cervical: cervical, postural, scapular, scapulothoracic and UE control with self care, reaching, carrying, lifting, house/yardwork, driving, computer work. [x] (90963) Provided verbal/tactile cueing for activities related to improving balance, coordination, kinesthetic sense, posture, motor skill, proprioception to assist with   [] LE / lumbar: LE, proximal hip, and core control in self care, mobility, lifting, ambulation and eccentric single leg control. [] UE / cervical: cervical, scapular, scapulothoracic and UE control with self care, reaching, carrying, lifting, house/yardwork, driving, computer work. [x] (29986) Therapist is in constant attendance of 2 or more patients providing skilled therapy interventions, but not providing any significant amount of measurable one-on-one time to either patient, for improvements in  [] LE / lumbar: LE, proximal hip, and core control in self care, mobility, lifting, ambulation and eccentric single leg control. [] UE / cervical: cervical, scapular, scapulothoracic and UE control with self care, reaching, carrying, lifting, house/yardwork, driving, computer work. NMR and Therapeutic Activities:    [x] (09824 or 43066) Provided verbal/tactile cueing for activities related to improving balance, coordination, kinesthetic sense, posture, motor skill, proprioception and motor activation to allow for proper function of   [] LE: / Lumbar core, proximal hip and LE with self care and ADLs  [] UE / Cervical: cervical, postural, scapular, scapulothoracic and UE control with self care, carrying, lifting, driving, computer work. [x] (95896) Gait Re-education- Provided training and instruction to the patient for proper LE, core and proximal hip recruitment and positioning and eccentric body weight control with ambulation re-education including up and down stairs     Home Management Training / Self Care:  [] (57642) Provided self-care/home management training related to activities of daily living and compensatory training, and/or use of adaptive equipment for improvement with: ADLs and compensatory training, meal preparation, safety procedures and instruction in use of adaptive equipment, including bathing, grooming, dressing, personal hygiene, basic household cleaning and chores.      Home Exercise Program:    [] (13750) Reviewed/Progressed HEP activities related to strengthening, flexibility, endurance, ROM of   [] LE / Lumbar: core, proximal hip and LE for functional self-care, mobility, lifting and ambulation/stair navigation   [] UE / Cervical: cervical, postural, scapular, scapulothoracic and UE control with self care, reaching, carrying, lifting, house/yardwork, driving, computer work  [] (18889)Reviewed/Progressed HEP activities related to improving balance, coordination, kinesthetic sense, posture, motor skill, proprioception of   [] LE: core, proximal hip and LE for self care, mobility, lifting, and ambulation/stair navigation    [] UE / Cervical: cervical, postural,  scapular, scapulothoracic and UE control with self care, reaching, carrying, lifting, house/yardwork, driving, computer work    Manual Treatments:  PROM / STM / Oscillations-Mobs:  G-I, II, III, IV (PA's, Inf., Post.)  [] (51119) Provided manual therapy to mobilize LE, proximal hip and/or LS spine soft tissue/joints for the purpose of modulating pain, promoting relaxation,  increasing ROM, reducing/eliminating soft tissue swelling/inflammation/restriction, improving soft tissue extensibility and allowing for proper ROM for normal function with   [] LE / lumbar: self care, mobility, lifting and ambulation. [] UE / Cervical: self care, reaching, carrying, lifting, house/yardwork, driving, computer work. Modalities:  [] (83605) Vasopneumatic compression: Utilized vasopneumatic compression to decrease edema / swelling for the purpose of improving mobility and quad tone / recruitment which will allow for increased overall function including but not limited to self-care, transfers, ambulation, and ascending / descending stairs.        Charges:  Timed Code Treatment Minutes: 50   Total Treatment Minutes: 50     [] EVAL - LOW (10162)   [] EVAL - MOD (92398)  [] EVAL - HIGH (93969)  [] RE-EVAL (84490)  [x] RS(43306) x  2     [] Ionto  [x] NMR (27493) x  1     [] Vaso  [] Manual (47236) x       [] Ultrasound  [] TA x   2     [] Mech Traction (42943)  [] Aquatic Therapy x     [] ES (un) (73422):   [] Home Management Training x  [] ES(attended) (06873)   [] Dry Needling 1-2 muscles (38263):  [] Dry Needling 3+ muscles (844800)  [] Group:      [] Other:       GOALS:  Patient stated goal:  less back pain. Walk/ stand better  [x] Progressing: [] Met: [] Not Met: [] Adjusted    Therapist goals for Patient:   Short Term Goals: To be achieved in: 2 weeks  1. Independent in HEP and progression per patient tolerance, in order to prevent re-injury. [] Progressing: [x] Met: [] Not Met: [] Adjusted  2. Patient will have a decrease in pain to facilitate improvement in movement, function, and ADLs as indicated by improvement with respect to Functional Deficits. [] Progressing: [x] Met: [] Not Met: [] Adjusted    Long Term Goals: To be achieved in: 6 weeks  1. ZAIN functional survey score of </= 20% disability  to assist with reaching prior level of function. [] Progressing: [] Met: [] Not Met: [] Adjusted  2. Patient will demonstrate increased AROM  to [facility], to allow for proper joint functioning to allow pt to antionette good posture and ergonomics for working at [employer] for her  job, resume  standing upright prn for cooking, ADLs, reaching into kitchen cabinets, using good posture and body mechs without increase in symptoms. [x] Progressing: [] Met: [] Not Met: [] Adjusted  3. Patient will demonstrate increased Strength and core activation to allow for proper functional mobility as indicated by patients Functional Deficits to allow pt to resume up/down 17 steps to enter 2nd floor condo  without increase in symptoms. [x] Progressing: [] Met: [] Not Met: [] Adjusted  4. Patient will return to functional activities including grocery shopping, sleep thru night , position changes, prolonged standing x 15+ min, prolonged sitting prn for working as  without increased symptoms or restriction. [x] Progressing: [] Met: [] Not Met: [] Adjusted      Overall Progression Towards Functional goals/ Treatment Progress Update:  [] Patient is progressing as expected towards functional goals listed.     [x] Progression is slowed due to complexities/Impairments listed. [] Progression has been slowed due to co-morbidities. [] Plan just implemented, too soon to assess goals progression <30days   [] Goals require adjustment due to lack of progress  [] Patient is not progressing as expected and requires additional follow up with physician  [] Other    Persisting Functional Limitations/Impairments:  []Sleeping [x]Sitting               [x]Standing [x]Transfers        [x]Walking [x]Kneeling               [x]Stairs [x]Squatting / bending   [x]ADLs []Reaching  [x]Lifting  [x]Housework  []Driving [x]Job related tasks  [x]Sports/Recreation []Other:        ASSESSMENT:  continuing to work on lumbar mobility and posterior chain strengthening; symptoms consistent with stenosis; patient also has a knee that is in need of being replaced that contributes to gait abnormalities  Treatment/Activity Tolerance:  [x] Patient able to complete tx [] Patient limited by fatigue  [] Patient limited by pain  [] Patient limited by other medical complications  [] Other:     Prognosis: [x] Good [] Fair  [] Poor    Patient Requires Follow-up: [x] Yes  [] No    Plan for next treatment session:continue stretching and strengthening with land visits  PLAN:  strength, ROM/flexibility, posture and body mechs, manual, MOC, HEP, pt education    Frequency/Duration:  2 days per week for   6 Weeks:  Interventions:  [x]  Therapeutic exercise including:strength, ROM, flexibility  [x]  NMR activation and proprioception including postural re-education  [x]  Manual therapy as indicated to include: IASTM, STM, PROM, Gr I-IV mobilizations, manipulation. [x]  Modalities as needed that may include: thermal agents, E-stim, Biofeedback, US, iontophoresis as indicated  [x]  Patient education on joint protection, postural re-education, activity modification, progression of HEP. Aquatic therapy    PLAN: See eval. PT 2x / week for 6 weeks.    [x] Continue per plan of care [] Alter current plan (see comments)  [] Plan of care initiated [] Hold pending MD visit [] Discharge    Electronically signed by: Agnes Haque PT  DPT    Note: If patient does not return for scheduled/ recommended follow up visits, this note will serve as a discharge from care along with most recent update on progress.

## 2022-11-30 ENCOUNTER — HOSPITAL ENCOUNTER (OUTPATIENT)
Dept: PHYSICAL THERAPY | Age: 72
Setting detail: THERAPIES SERIES
Discharge: HOME | End: 2022-11-30
Payer: COMMERCIAL

## 2022-11-30 PROCEDURE — 97140 MANUAL THERAPY 1/> REGIONS: CPT

## 2022-11-30 PROCEDURE — 97112 NEUROMUSCULAR REEDUCATION: CPT

## 2022-11-30 PROCEDURE — 97110 THERAPEUTIC EXERCISES: CPT

## 2022-11-30 NOTE — PLAN OF CARE
[facility]  Phone: [phone]   Fax: [phone]  Physical Therapy Re-Certification Plan of Care    Dear Zoe Augustine,*  ,    We had the pleasure of treating the following patient for physical therapy services at [facility]. A summary of our findings can be found in the updated assessment below. This includes our plan of care. If you have any questions or concerns regarding these findings, please do not hesitate to contact me at the office phone number checked above. Thank you for the referral.     Physician Signature:________________________________Date:__________________  By signing above (or electronic signature), therapist's plan is approved by physician      Functional Outcome:     FOTO: FOTO score = 50, discharge goal 58    Overall Response to Treatment:   []Patient is responding well to treatment and improvement is noted with regards  to goals   []Patient should continue to improve in reasonable time if they continue HEP   []Patient has plateaued and is no longer responding to skilled PT intervention    []Patient is getting worse and would benefit from return to referring MD   []Patient unable to adhere to initial POC   [x]Other: symptoms are somewhat improved; very weak core, gluteals, and abductors along with weak and painful knees, worse on the right; all of this contributes to slow progress in PT; has not yet scheduled MRI due to fear of laying in an MRI machine for 45 minutes; I have encouraged patient to call MD to discuss options. Patient could benefit from continued PT for further strengthening within her pain tolerance    Date range of Visits: 10/8/22  Total Visits: 12    Recommendation:    [x]Continue PT 2x / wk for 6 weeks.                []Hold PT, pending MD visit       Physical Therapy Daily Treatment Note    Date:  2022     Patient Name:  Pb Vazquez    : 1950  MRN: [id number]  Medical Diagnosis:  DDD (degenerative disc disease), lumbar [M51.36]  Spondylolisthesis of lumbar region [M43.16]  Treatment Diagnosis: antalgia, flexed trunk, R>L knee pain contributing to assymmetrical gait, flexibility and trunk ROM deficits, strength deficits of trunk >LE mm. B knee flex contractures. Flexion of spine/trunk relieves back symptoms                                          Insurance/Certification information:  PT Insurance Information: UMR ded met, no copay, no auth, # of visits TBD  Physician Information:  Chary Gomez,* date of referral to PT: 22  Plan of care signed (Y/N): [x]  Yes []  No     Date of Patient follow up with Physician:      Progress Report: [x]  Yes   []  No     Date Range for reporting period:  Beginning: 10/8/2022  Endin22    Progress report due (10 Rx/or 30 days whichever is less): visit #10 or       Recertification due (POC duration/ or 90 days whichever is less): visit #12 or 22 (date)     Visit # Insurance Allowable Auth required? Date Range    TBD []  Yes  [x]  No NA         Latex Allergy:  [x]NO      []YES  Preferred Language for Healthcare:   [x]English       []other:    Functional Scale:           Date assessed:  ZAIN score = 18/50        10/8/22  FOTO score = 50, discharge goal 58     22  ZAIN =12/45         22    Pain level:  4/10     SUBJECTIVE:  still has not scheduled MRI because of fear of the test. Pain a little less today.  Wants to continue PT    OBJECTIVE: : walking without AD with moderate antalgia due to hip and knee weakness; stands and walks in a forward bent posture; AROM lumbar spine flex with inclinometer: 45 deg, ext with inclinometer 10 deg; right knee AROM in supine 0-, prone R knee flex to 83 deg; hip flex strength 4+/5, hip abd NT this date, but noted glut med weakness in standing, hip ext bilaterally 3+/5    RESTRICTIONS/PRECAUTIONS:  HTN, Knee OA - R>L - hoping to avoid TKRs. HAs, Lymphedema B LE- previous PT tx for it  - wears compression stockings, has lymphedema pump she uses 3-4x/wk. Exercises/Interventions:     Therapeutic Exercises (15929) Resistance / level Sets/sec Reps Notes   Sci fit L1  8 min S=22, arms = 7    IB   3 x 30 sec B    Step stretches  HS  Hip ext/knee flex  Hip add stretch at wall   2 x 30\" B  2 x 30\" B  5 x 10\" Prone to prone on elbows   X 10 with 10 sec holds     Seated lumbar flex mob with ball   X 10                Prone (over 2 pillows) knee flex  Prone over 2 pillow hip ext   X 10 B  X 10 B    Standing hip abd at wall   X 10 B    Therapeutic Activities (90540)       Postural ed/computer ergonomics, take breaks - including short walking breaks      Patient ed   Re-assessment for PN                    Standing ball on wall with squats X 15    Cables: mid rows standing  Shld ext  Shld add      Standing ab lift off wall      Wall posture stretch      Neuromuscular Re-ed (02151)       Seated SB--AP, ML, circles each direction    Hip gliders, ext and abd B    Balance ex  SLR X 3 on airex    Partial plank at table with alt a/l lift  Partial plank at table with lumb mobility   X 10   X 10    AirEx in // bars  NBOS no hands, eyes closed  Normal stance: ball OH, rot       5#     SLS with hands on bars (on ground)      Manual Intervention (72239)       MET  As antionette      STM/MFR sacrum       Ant glides in prone bilateral hips and anterolateral glides, grade 3   x    Prone quad stretch B   x                         Pt. Education: 10/8/22 Gave pt tour of pool, explained how to use lockers, what to wear, that it would be in group setting, and showed pt aquatic equipment.   10/24: gait training with cane and rollator; did not reduce pain in her lumbar spine and patient does not want to use and AD     Modalities:     Pt. Education:  10/08/2022  -patient educated on diagnosis, prognosis and expectations for rehab  -all patient questions were answered  Pt ed re rationale and role of aquatics and OA at knees and back    Home Exercise Program:  Access Code: [email or web address]  URL: [email or web address]  Date: 10/08/2022  Prepared by: Dominik Chowdhury    Exercises  Supine Transversus Abdominis Bracing - Hands on Stomach - 2 x daily - 7 x weekly - 1 sets - 10 reps - 5 hold  Hooklying Single Knee to Chest Stretch - 2 x daily - 7 x weekly - 1 sets - 3-5 reps - 15-30 hold  Seated Table Hamstring Stretch - 3 x daily - 7 x weekly - 1-2 sets - 3-5 reps - 15-30 hold  Seated Correct Posture - 2-3 x daily - 7 x weekly - 1-2 sets - 10 reps - 5-10 min hold  Walking - 2-4 x daily - 7 x weekly - 1-10 min hold        Therapeutic Exercise and NMR EXR  [x] (08705) Provided verbal/tactile cueing for activities related to strengthening, flexibility, endurance, ROM for improvements in  [] LE / Lumbar: LE, proximal hip, and core control with self care, mobility, lifting, ambulation. [] UE / Cervical: cervical, postural, scapular, scapulothoracic and UE control with self care, reaching, carrying, lifting, house/yardwork, driving, computer work. [x] (35735) Provided verbal/tactile cueing for activities related to improving balance, coordination, kinesthetic sense, posture, motor skill, proprioception to assist with   [] LE / lumbar: LE, proximal hip, and core control in self care, mobility, lifting, ambulation and eccentric single leg control. [] UE / cervical: cervical, scapular, scapulothoracic and UE control with self care, reaching, carrying, lifting, house/yardwork, driving, computer work. [x] (56073) Therapist is in constant attendance of 2 or more patients providing skilled therapy interventions, but not providing any significant amount of measurable one-on-one time to either patient, for improvements in  [] LE / lumbar: LE, proximal hip, and core control in self care, mobility, lifting, ambulation and eccentric single leg control.    [] UE / cervical: cervical, scapular, scapulothoracic and UE control with self care, reaching, carrying, lifting, house/yardwork, driving, computer work. NMR and Therapeutic Activities:    [x] (07587 or 23470) Provided verbal/tactile cueing for activities related to improving balance, coordination, kinesthetic sense, posture, motor skill, proprioception and motor activation to allow for proper function of   [] LE: / Lumbar core, proximal hip and LE with self care and ADLs  [] UE / Cervical: cervical, postural, scapular, scapulothoracic and UE control with self care, carrying, lifting, driving, computer work. [x] (07704) Gait Re-education- Provided training and instruction to the patient for proper LE, core and proximal hip recruitment and positioning and eccentric body weight control with ambulation re-education including up and down stairs     Home Management Training / Self Care:  [] (00048) Provided self-care/home management training related to activities of daily living and compensatory training, and/or use of adaptive equipment for improvement with: ADLs and compensatory training, meal preparation, safety procedures and instruction in use of adaptive equipment, including bathing, grooming, dressing, personal hygiene, basic household cleaning and chores.      Home Exercise Program:    [] (73389) Reviewed/Progressed HEP activities related to strengthening, flexibility, endurance, ROM of   [] LE / Lumbar: core, proximal hip and LE for functional self-care, mobility, lifting and ambulation/stair navigation   [] UE / Cervical: cervical, postural, scapular, scapulothoracic and UE control with self care, reaching, carrying, lifting, house/yardwork, driving, computer work  [] (34398)Reviewed/Progressed HEP activities related to improving balance, coordination, kinesthetic sense, posture, motor skill, proprioception of   [] LE: core, proximal hip and LE for self care, mobility, lifting, and ambulation/stair navigation    [] UE / Cervical: cervical, postural, scapular, scapulothoracic and UE control with self care, reaching, carrying, lifting, house/yardwork, driving, computer work    Manual Treatments:  PROM / STM / Oscillations-Mobs:  G-I, II, III, IV (PA's, Inf., Post.)  [] (86298) Provided manual therapy to mobilize LE, proximal hip and/or LS spine soft tissue/joints for the purpose of modulating pain, promoting relaxation,  increasing ROM, reducing/eliminating soft tissue swelling/inflammation/restriction, improving soft tissue extensibility and allowing for proper ROM for normal function with   [] LE / lumbar: self care, mobility, lifting and ambulation. [] UE / Cervical: self care, reaching, carrying, lifting, house/yardwork, driving, computer work. Modalities:  [] (58408) Vasopneumatic compression: Utilized vasopneumatic compression to decrease edema / swelling for the purpose of improving mobility and quad tone / recruitment which will allow for increased overall function including but not limited to self-care, transfers, ambulation, and ascending / descending stairs. Charges:  Timed Code Treatment Minutes: 49   Total Treatment Minutes: 49     [] EVAL - LOW (12517)   [] EVAL - MOD (16737)  [] EVAL - HIGH (00420)  [] RE-EVAL (32774)  [x] HH(20692) x  2     [] Ionto  [x] NMR (75504) x  1     [] Vaso  [x] Manual (44137) x  1     [] Ultrasound  [] TA x   2     [] Mech Traction (34264)  [] Aquatic Therapy x     [] ES (un) (52152):   [] Home Management Training x  [] ES(attended) (58523)   [] Dry Needling 1-2 muscles (81386):  [] Dry Needling 3+ muscles (529405)  [] Group:      [] Other:       GOALS:  Patient stated goal:  less back pain. Walk/ stand better  [x] Progressing: [] Met: [] Not Met: [] Adjusted    Therapist goals for Patient:   Short Term Goals: To be achieved in: 2 weeks  1. Independent in HEP and progression per patient tolerance, in order to prevent re-injury. [] Progressing: [x] Met: [] Not Met: [] Adjusted  2.  Patient will have a decrease in pain to facilitate improvement in movement, function, and ADLs as indicated by improvement with respect to Functional Deficits. [] Progressing: [x] Met: [] Not Met: [] Adjusted    Long Term Goals: To be achieved in: 6 weeks  1. ZAIN functional survey score of </= 20% disability  to assist with reaching prior level of function. [] Progressing: [] Met: [] Not Met: [] Adjusted  2. Patient will demonstrate increased AROM  to [employer] [facility] [employer], to allow for proper joint functioning to allow pt to antionette good posture and ergonomics for working at [employer] for her  job, resume  standing upright prn for cooking, ADLs, reaching into kitchen cabinets, using good posture and body mechs without increase in symptoms. [x] Progressing: [] Met: [] Not Met: [] Adjusted  3. Patient will demonstrate increased Strength and core activation to allow for proper functional mobility as indicated by patients Functional Deficits to allow pt to resume up/down 17 steps to enter 2nd floor condo  without increase in symptoms. [x] Progressing: [] Met: [] Not Met: [] Adjusted  4. Patient will return to functional activities including grocery shopping, sleep thru night , position changes, prolonged standing x 15+ min, prolonged sitting prn for working as  without increased symptoms or restriction. [x] Progressing: [] Met: [] Not Met: [] Adjusted      Overall Progression Towards Functional goals/ Treatment Progress Update:  [] Patient is progressing as expected towards functional goals listed. [x] Progression is slowed due to complexities/Impairments listed. [] Progression has been slowed due to co-morbidities.   [] Plan just implemented, too soon to assess goals progression <30days   [] Goals require adjustment due to lack of progress  [] Patient is not progressing as expected and requires additional follow up with physician  [] Other    Persisting Functional Limitations/Impairments:  []Sleeping [x]Sitting               [x]Standing [x]Transfers        [x]Walking [x]Kneeling               [x]Stairs [x]Squatting / bending   [x]ADLs []Reaching  [x]Lifting  [x]Housework  []Driving [x]Job related tasks  [x]Sports/Recreation []Other:        ASSESSMENT:  continuing to work on lumbar mobility and posterior chain strengthening; symptoms consistent with stenosis; patient also has a knee that is in need of being replaced that contributes to gait abnormalities; requesting extension of therapy  Treatment/Activity Tolerance:  [x] Patient able to complete tx [] Patient limited by fatigue  [] Patient limited by pain  [] Patient limited by other medical complications  [] Other:     Prognosis: [x] Good [] Fair  [] Poor    Patient Requires Follow-up: [x] Yes  [] No    Plan for next treatment session:continue stretching and strengthening with land visits  PLAN:  strength, ROM/flexibility, posture and body mechs, manual, MOC, HEP, pt education    Frequency/Duration:  2 days per week for   6 Weeks:  Interventions:  [x]  Therapeutic exercise including:strength, ROM, flexibility  [x]  NMR activation and proprioception including postural re-education  [x]  Manual therapy as indicated to include: IASTM, STM, PROM, Gr I-IV mobilizations, manipulation. [x]  Modalities as needed that may include: thermal agents, E-stim, Biofeedback, US, iontophoresis as indicated  [x]  Patient education on joint protection, postural re-education, activity modification, progression of HEP. Aquatic therapy    PLAN: See ladarius. PT 2x / week for 6 weeks. [x] Continue per plan of care [] Alter current plan (see comments)  [] Plan of care initiated [] Hold pending MD visit [] Discharge    Electronically signed by: Vincenzo Vela PT  DPT    Note: If patient does not return for scheduled/ recommended follow up visits, this note will serve as a discharge from care along with most recent update on progress.

## 2022-12-09 ENCOUNTER — HOSPITAL ENCOUNTER (OUTPATIENT)
Dept: PHYSICAL THERAPY | Age: 72
Setting detail: THERAPIES SERIES
Discharge: HOME | End: 2022-12-09

## 2022-12-09 NOTE — FLOWSHEET NOTE
[address]     Physical Therapy  Cancellation/No-show Note  Patient Name:  Joan Mercedes  :  1950   Date:  2022  Cancelled visits to date: 1 ()  No-shows to date: 0    Patient status for today's appointment patient:  [x]  Cancelled  []  Rescheduled appointment  []  No-show     Reason given by patient:  [x]  Patient ill  []  Conflicting appointment  []  No transportation    []  Conflict with work  []  No reason given  []  Other:     Comments:      Phone call information:   []  Phone call made today to patient at _ time at number provided:      []  Patient answered, conversation as follows:    []  Patient did not answer, message left as follows:  []  Phone call not made today  [x]  Phone call not needed - pt contacted us to cancel and provided reason for cancellation.      Electronically signed by:  Frantz Sharma PT

## 2023-01-05 ENCOUNTER — TELEPHONE (OUTPATIENT)
Dept: INTERNAL MEDICINE CLINIC | Age: 73
End: 2023-01-05

## 2023-01-05 DIAGNOSIS — U07.1 COVID-19: Primary | ICD-10-CM

## 2023-01-05 NOTE — TELEPHONE ENCOUNTER
Patient tested positive for covid on 1/4/22    Runny nose slight fever    She was letting us know she had it and to make sure she didn't need anything    She has been taking tylenol  And flonase    She said she is not congested or anything   Her fever is just 99.00          [id number] [id number] [facility], [address] [place] [address] Road - P [phone] - F [phone]

## 2023-01-05 NOTE — TELEPHONE ENCOUNTER
Ordered Paxlovid to her pharmacy. She can take this if desired. It does not shorten the course of illness or help with symptoms to reduce the severity of illness, reduce the likelihood of hospitalization, likelihood of death in patients age over 72. Continue to use her usual medications. There are no interactions. Tylenol for pain or fever     Robitussin DM for cough/congestion     Increase intake of fluids to stay hydrated     Monitor temperature ( thermometer) and oxygen saturation(using pulse oximetry) these are available at pharmacies and online . If oxygen saturation is less than 92%, got to emergency room. If you develop severe shortness of breath go to the emergency room. You should quarantine for 5 days if your symptoms have improved and your fever free you can end your quarantine and for the next 5 days be sure to wear a mask when you are around people.

## 2023-01-12 ENCOUNTER — OFFICE VISIT (OUTPATIENT)
Dept: INTERNAL MEDICINE CLINIC | Age: 73
End: 2023-01-12
Payer: COMMERCIAL

## 2023-01-12 VITALS
DIASTOLIC BLOOD PRESSURE: 82 MMHG | SYSTOLIC BLOOD PRESSURE: 136 MMHG | HEART RATE: 74 BPM | BODY MASS INDEX: 41.59 KG/M2 | WEIGHT: 265 LBS | OXYGEN SATURATION: 96 % | HEIGHT: 67 IN | TEMPERATURE: 97.4 F

## 2023-01-12 DIAGNOSIS — L25.9 CONTACT DERMATITIS, UNSPECIFIED CONTACT DERMATITIS TYPE, UNSPECIFIED TRIGGER: Primary | ICD-10-CM

## 2023-01-12 PROCEDURE — 3079F DIAST BP 80-89 MM HG: CPT | Performed by: HOSPITALIST

## 2023-01-12 PROCEDURE — 1123F ACP DISCUSS/DSCN MKR DOCD: CPT | Performed by: HOSPITALIST

## 2023-01-12 PROCEDURE — 3075F SYST BP GE 130 - 139MM HG: CPT | Performed by: HOSPITALIST

## 2023-01-12 PROCEDURE — 99213 OFFICE O/P EST LOW 20 MIN: CPT | Performed by: HOSPITALIST

## 2023-01-12 SDOH — ECONOMIC STABILITY: FOOD INSECURITY: WITHIN THE PAST 12 MONTHS, THE FOOD YOU BOUGHT JUST DIDN'T LAST AND YOU DIDN'T HAVE MONEY TO GET MORE.: NEVER TRUE

## 2023-01-12 SDOH — ECONOMIC STABILITY: FOOD INSECURITY: WITHIN THE PAST 12 MONTHS, YOU WORRIED THAT YOUR FOOD WOULD RUN OUT BEFORE YOU GOT MONEY TO BUY MORE.: NEVER TRUE

## 2023-01-12 ASSESSMENT — SOCIAL DETERMINANTS OF HEALTH (SDOH): HOW HARD IS IT FOR YOU TO PAY FOR THE VERY BASICS LIKE FOOD, HOUSING, MEDICAL CARE, AND HEATING?: NOT HARD AT ALL

## 2023-01-12 ASSESSMENT — PATIENT HEALTH QUESTIONNAIRE - PHQ9
SUM OF ALL RESPONSES TO PHQ9 QUESTIONS 1 & 2: 0
1. LITTLE INTEREST OR PLEASURE IN DOING THINGS: 0
2. FEELING DOWN, DEPRESSED OR HOPELESS: 0
SUM OF ALL RESPONSES TO PHQ QUESTIONS 1-9: 0

## 2023-01-12 ASSESSMENT — ENCOUNTER SYMPTOMS
CONSTIPATION: 0
VOICE CHANGE: 0
VOMITING: 0
SINUS PAIN: 1
SORE THROAT: 0
BLOOD IN STOOL: 0
SHORTNESS OF BREATH: 0
TROUBLE SWALLOWING: 0
DIARRHEA: 0
ABDOMINAL DISTENTION: 0
CHEST TIGHTNESS: 0
SINUS PRESSURE: 1
WHEEZING: 0
COUGH: 0
BACK PAIN: 0
NAUSEA: 0
ABDOMINAL PAIN: 0

## 2023-01-12 NOTE — PROGRESS NOTES
[facility] Internal Medicine  Acute visit   2023    Mario Mitchell (:  1950) is a 67 y.o. female, here for evaluation of the following medical concerns:    Chief Complaint   Patient presents with    Allergic Reaction     Possible reaction to Paxlovid, red face and chest, she's shakey, her head feels like a ballon    Drainage     Nasal drainage        HPI  The patient is a 66-year-old female with a past medical history of well-controlled hypertension, vitamin D deficiency, right knee arthritis and degenerative joint disease of the lumbar spine. She recently had COVID infection and was prescribed Paxlovid. She was able to complete the problem without difficulty. Approximately 2 days after completing the medication she developed a rash on the face, and chest.  This is an erythematous rash. It is not pruritic. It appears to be contact dermatitis of some sort. She denies any use of new cosmetics, detergents, or purchase of any new clothes. She also had a feeling of sinus congestion. I have counseled her to use CeraVe lotion on the area as well as Benadryl 25 mg every 6 hours as needed for a short duration (OTC). ROS  Review of Systems   Constitutional:  Positive for fatigue. Negative for activity change, appetite change, chills, diaphoresis, fever and unexpected weight change. HENT:  Positive for congestion, sinus pressure and sinus pain. Negative for sore throat, trouble swallowing and voice change. Eyes:  Negative for visual disturbance. Respiratory:  Negative for cough, chest tightness, shortness of breath and wheezing. Cardiovascular:  Negative for chest pain, palpitations and leg swelling. Gastrointestinal:  Negative for abdominal distention, abdominal pain, blood in stool, constipation, diarrhea, nausea and vomiting. Endocrine: Negative for polydipsia and polyphagia. Genitourinary:  Negative for decreased urine volume, difficulty urinating, dysuria and urgency. Musculoskeletal:  Negative for back pain, gait problem, joint swelling and myalgias. Skin:  Positive for rash. Neurological:  Negative for dizziness, seizures, syncope, light-headedness and headaches. Psychiatric/Behavioral:  Negative for agitation, behavioral problems, confusion and suicidal ideas. HISTORIES  Current Outpatient Medications on File Prior to Visit   Medication Sig Dispense Refill    aspirin 81 MG EC tablet       cholestyramine (QUESTRAN) 4 g packet USE ONE PACKET IN WATER 2 TIMES DAILY 180 packet 1    meloxicam (MOBIC) 15 MG tablet Take 1 tablet by mouth daily as needed for Pain 30 tablet 1    losartan (COZAAR) 100 MG tablet TAKE 1 TABLET BY MOUTH EVERY DAY 90 tablet 3    omeprazole (PRILOSEC) 40 MG delayed release capsule        No current facility-administered medications on file prior to visit.       Allergies   Allergen Reactions    Ciprofloxacin      G.I. Upset     Past Medical History:   Diagnosis Date    Acquired lymphedema of lower extremity     Esophageal reflux     Flushing     HTN (hypertension)     Idiopathic chronic venous hypertension of left lower extremity with inflammation     Labyrinthitis, unspecified     Lumbar disc disease     traumatic fall    Migraine     Pain in limb     left leg    Screening mammogram 02/29/2008    (Both)Negative     Patient Active Problem List   Diagnosis    Hypertension    Migraine headache    Edema    Loose stools    Microhematuria    Lightheadedness    Vitamin D deficiency    Esophageal dysphagia    Degeneration of meniscus of right knee    Degenerative arthritis of right knee    Chronic low back pain    Tinnitus of left ear     Past Surgical History:   Procedure Laterality Date    APPENDECTOMY      CHOLECYSTECTOMY      COLONOSCOPY  11/2014    negative - Dr Rick Temple ENDOSCOPY  09/2019    Small sliding hiatal hernia     Social History     Socioeconomic History    Marital status: Single     Spouse name: Not on file Number of children: Not on file    Years of education: Not on file    Highest education level: Not on file   Occupational History    Not on file   Tobacco Use    Smoking status: Never    Smokeless tobacco: Never   Substance and Sexual Activity    Alcohol use: No    Drug use: No    Sexual activity: Not on file   Other Topics Concern    Not on file   Social History Narrative    Not on file     Social Determinants of Health     Financial Resource Strain: Low Risk     Difficulty of Paying Living Expenses: Not hard at all   Food Insecurity: No Food Insecurity    Worried About Running Out of Food in the Last Year: Never true    Ran Out of Food in the Last Year: Never true   Transportation Needs: Not on file   Physical Activity: Inactive    Days of Exercise per Week: 0 days    Minutes of Exercise per Session: 0 min   Stress: Not on file   Social Connections: Not on file   Intimate Partner Violence: Not At Risk    Fear of Current or Ex-Partner: No    Emotionally Abused: No    Physically Abused: No    Sexually Abused: No   Housing Stability: Not on file      Family History   Problem Relation Age of Onset    Cancer Mother         skin, SCC       PE  Vitals:    01/12/23 1054   BP: 136/82   Site: Left Upper Arm   Position: Sitting   Pulse: 74   Temp: 97.4 °F (36.3 °C)   SpO2: 96%   Weight: 265 lb (120.2 kg)   Height: 5' 7\" (1.702 m)     Estimated body mass index is 41.5 kg/m² as calculated from the following:    Height as of this encounter: 5' 7\" (1.702 m). Weight as of this encounter: 265 lb (120.2 kg). Physical Exam  Vitals reviewed. Constitutional:       General: She is not in acute distress. Appearance: Normal appearance. HENT:      Head: Normocephalic and atraumatic. Mouth/Throat:      Pharynx: Oropharynx is clear. Eyes:      Conjunctiva/sclera: Conjunctivae normal.      Pupils: Pupils are equal, round, and reactive to light. Cardiovascular:      Rate and Rhythm: Normal rate and regular rhythm. Pulses: Normal pulses. Heart sounds: Normal heart sounds. Pulmonary:      Effort: Pulmonary effort is normal. No respiratory distress. Breath sounds: Normal breath sounds. No wheezing or rales. Abdominal:      Palpations: Abdomen is soft. Tenderness: There is no abdominal tenderness. There is no rebound. Musculoskeletal:         General: No signs of injury. Normal range of motion. Cervical back: Normal range of motion and neck supple. Skin:     General: Skin is warm and dry. Coloration: Skin is not jaundiced. Findings: Rash present. Neurological:      General: No focal deficit present. Mental Status: She is alert and oriented to person, place, and time. Psychiatric:         Behavior: Behavior normal.         Thought Content: Thought content normal.         Judgment: Judgment normal.       ASSESSMENT/ PLAN:  1. Contact dermatitis, unspecified contact dermatitis type, unspecified trigger    The patient is a 78-year-old female with a past medical history of well-controlled hypertension, vitamin D deficiency, right knee arthritis and degenerative joint disease of the lumbar spine. She recently had COVID infection and was prescribed Paxlovid. She was able to complete the problem without difficulty. Approximately 2 days after completing the medication she developed a rash on the face, and chest.  This is an erythematous rash. It is not pruritic. It appears to be contact dermatitis of some sort. She denies any use of new cosmetics, detergents, or purchase of any new clothes. She also had a feeling of sinus congestion. I have counseled her to use CeraVe lotion on the area as well as Benadryl 25 mg every 6 hours as needed for a short duration (OTC). No orders of the defined types were placed in this encounter. No orders of the defined types were placed in this encounter. There are no discontinued medications.      The patient has pre-existing follow-up scheduled for 5/16/2023. Reji Hauser MD    This dictation was generated by voice recognition computer software. Although all attempts are made to edit the dictation for accuracy, there may be errors in the transcription that are not intended.

## 2023-05-06 DIAGNOSIS — I10 PRIMARY HYPERTENSION: ICD-10-CM

## 2023-05-06 DIAGNOSIS — E55.9 VITAMIN D DEFICIENCY: ICD-10-CM

## 2023-05-06 LAB
25(OH)D3 SERPL-MCNC: 14.7 NG/ML
ALBUMIN SERPL-MCNC: 4.5 G/DL (ref 3.4–5)
ALBUMIN/GLOB SERPL: 2 {RATIO} (ref 1.1–2.2)
ALP SERPL-CCNC: 116 U/L (ref 40–129)
ALT SERPL-CCNC: 13 U/L (ref 10–40)
ANION GAP SERPL CALCULATED.3IONS-SCNC: 12 MMOL/L (ref 3–16)
AST SERPL-CCNC: 13 U/L (ref 15–37)
BILIRUB SERPL-MCNC: 0.5 MG/DL (ref 0–1)
BUN SERPL-MCNC: 14 MG/DL (ref 7–20)
CALCIUM SERPL-MCNC: 9.1 MG/DL (ref 8.3–10.6)
CHLORIDE SERPL-SCNC: 106 MMOL/L (ref 99–110)
CO2 SERPL-SCNC: 24 MMOL/L (ref 21–32)
CREAT SERPL-MCNC: 0.5 MG/DL (ref 0.6–1.2)
DEPRECATED RDW RBC AUTO: 14.8 % (ref 12.4–15.4)
GFR SERPLBLD CREATININE-BSD FMLA CKD-EPI: >60 ML/MIN/{1.73_M2}
GLUCOSE SERPL-MCNC: 97 MG/DL (ref 70–99)
HCT VFR BLD AUTO: 43.6 % (ref 36–48)
HGB BLD-MCNC: 14 G/DL (ref 12–16)
MCH RBC QN AUTO: 27.9 PG (ref 26–34)
MCHC RBC AUTO-ENTMCNC: 32.1 G/DL (ref 31–36)
MCV RBC AUTO: 86.9 FL (ref 80–100)
PLATELET # BLD AUTO: 262 K/UL (ref 135–450)
PMV BLD AUTO: 9.2 FL (ref 5–10.5)
POTASSIUM SERPL-SCNC: 4.9 MMOL/L (ref 3.5–5.1)
PROT SERPL-MCNC: 6.8 G/DL (ref 6.4–8.2)
RBC # BLD AUTO: 5.02 M/UL (ref 4–5.2)
SODIUM SERPL-SCNC: 142 MMOL/L (ref 136–145)
WBC # BLD AUTO: 8.2 K/UL (ref 4–11)

## 2023-05-15 SDOH — ECONOMIC STABILITY: FOOD INSECURITY: WITHIN THE PAST 12 MONTHS, YOU WORRIED THAT YOUR FOOD WOULD RUN OUT BEFORE YOU GOT MONEY TO BUY MORE.: NEVER TRUE

## 2023-05-15 SDOH — ECONOMIC STABILITY: TRANSPORTATION INSECURITY
IN THE PAST 12 MONTHS, HAS LACK OF TRANSPORTATION KEPT YOU FROM MEETINGS, WORK, OR FROM GETTING THINGS NEEDED FOR DAILY LIVING?: NO

## 2023-05-15 SDOH — ECONOMIC STABILITY: HOUSING INSECURITY
IN THE LAST 12 MONTHS, WAS THERE A TIME WHEN YOU DID NOT HAVE A STEADY PLACE TO SLEEP OR SLEPT IN A SHELTER (INCLUDING NOW)?: NO

## 2023-05-15 SDOH — ECONOMIC STABILITY: FOOD INSECURITY: WITHIN THE PAST 12 MONTHS, THE FOOD YOU BOUGHT JUST DIDN'T LAST AND YOU DIDN'T HAVE MONEY TO GET MORE.: NEVER TRUE

## 2023-05-15 SDOH — ECONOMIC STABILITY: INCOME INSECURITY: HOW HARD IS IT FOR YOU TO PAY FOR THE VERY BASICS LIKE FOOD, HOUSING, MEDICAL CARE, AND HEATING?: NOT HARD AT ALL

## 2023-05-16 ENCOUNTER — OFFICE VISIT (OUTPATIENT)
Dept: INTERNAL MEDICINE CLINIC | Age: 73
End: 2023-05-16
Payer: COMMERCIAL

## 2023-05-16 VITALS
DIASTOLIC BLOOD PRESSURE: 82 MMHG | HEART RATE: 76 BPM | OXYGEN SATURATION: 100 % | BODY MASS INDEX: 42.85 KG/M2 | SYSTOLIC BLOOD PRESSURE: 118 MMHG | WEIGHT: 273 LBS | TEMPERATURE: 97.3 F | HEIGHT: 67 IN

## 2023-05-16 DIAGNOSIS — E55.9 VITAMIN D DEFICIENCY: ICD-10-CM

## 2023-05-16 DIAGNOSIS — I10 PRIMARY HYPERTENSION: Primary | ICD-10-CM

## 2023-05-16 DIAGNOSIS — R19.7 DIARRHEA, UNSPECIFIED TYPE: ICD-10-CM

## 2023-05-16 DIAGNOSIS — Z13.9 SCREENING DUE: ICD-10-CM

## 2023-05-16 PROCEDURE — 1123F ACP DISCUSS/DSCN MKR DOCD: CPT | Performed by: HOSPITALIST

## 2023-05-16 PROCEDURE — 99214 OFFICE O/P EST MOD 30 MIN: CPT | Performed by: HOSPITALIST

## 2023-05-16 PROCEDURE — 3079F DIAST BP 80-89 MM HG: CPT | Performed by: HOSPITALIST

## 2023-05-16 PROCEDURE — 3074F SYST BP LT 130 MM HG: CPT | Performed by: HOSPITALIST

## 2023-05-16 RX ORDER — LOSARTAN POTASSIUM 100 MG/1
100 TABLET ORAL DAILY
Qty: 90 TABLET | Refills: 3 | Status: SHIPPED | OUTPATIENT
Start: 2023-05-16

## 2023-05-16 RX ORDER — CHOLESTYRAMINE 4 G/9G
POWDER, FOR SUSPENSION ORAL
Qty: 180 PACKET | Refills: 1 | Status: SHIPPED | OUTPATIENT
Start: 2023-05-16

## 2023-05-16 NOTE — PROGRESS NOTES
[facility] Internal Medicine  Follow-up visit   2023    Jeremy Mcconnell (:  1950) is a 67 y.o. female, here for follow-up:    Chief Complaint   Patient presents with    Medication Refill        HPI  The patient is a 77-year-old female with a past medical history of well-controlled hypertension, vitamin D deficiency, right knee arthritis and degenerative joint disease of the lumbar spine. Essential hypertension: Patient's blood pressure is very well controlled on losartan 100 mg daily. She is using this regularly without side effect. No CV or occular sx. 2.  Diarrhea: Well controlled with Questran 4 g packets. Is using a packet every morning. 3.  Right Knee arthritis/meniscal degeneration: Patient initially saw Dr. Tatiana Mitchell in early  steroid injections. She most recently has been seeing Dr. Ashley Brownlee.  Deceived 3 Synvisc injections into the right knee. She has not had adequate relief from this. Currently receiving physical therapy. She uses Tylenol 3 times daily. Was prescribed meloxicam, but has not had this medication filled because she was concerned about the side effect of higher blood pressures. 4.  Degenerative Joint disease lumbar spine: The patient has been having work-up of this. She had an x-ray done on . No MRI yet. Contemplating having lumbar epidural steroid injections. 5.  Vitamin D deficiency: Patient's vitamin D level on 2023 was 14.7. I have recommended that she use 2000 international units of vitamin D daily. We will recheck at the next visit. ROS  Review of Systems    HISTORIES  Current Outpatient Medications on File Prior to Visit   Medication Sig Dispense Refill    aspirin 81 MG EC tablet       omeprazole (PRILOSEC) 40 MG delayed release capsule        No current facility-administered medications on file prior to visit.       Allergies   Allergen Reactions    Ciprofloxacin      G.I. Upset     Past Medical History:   Diagnosis Date

## 2023-11-04 DIAGNOSIS — E55.9 VITAMIN D DEFICIENCY: ICD-10-CM

## 2023-11-04 DIAGNOSIS — Z13.9 SCREENING DUE: ICD-10-CM

## 2023-11-04 DIAGNOSIS — R19.7 DIARRHEA, UNSPECIFIED TYPE: ICD-10-CM

## 2023-11-04 DIAGNOSIS — I10 PRIMARY HYPERTENSION: ICD-10-CM

## 2023-11-04 LAB
25(OH)D3 SERPL-MCNC: 21 NG/ML
ALBUMIN SERPL-MCNC: 4.3 G/DL (ref 3.4–5)
ALBUMIN/GLOB SERPL: 2.2 {RATIO} (ref 1.1–2.2)
ALP SERPL-CCNC: 110 U/L (ref 40–129)
ALT SERPL-CCNC: 10 U/L (ref 10–40)
ANION GAP SERPL CALCULATED.3IONS-SCNC: 13 MMOL/L (ref 3–16)
AST SERPL-CCNC: 11 U/L (ref 15–37)
BILIRUB SERPL-MCNC: 0.5 MG/DL (ref 0–1)
BUN SERPL-MCNC: 11 MG/DL (ref 7–20)
CALCIUM SERPL-MCNC: 9 MG/DL (ref 8.3–10.6)
CHLORIDE SERPL-SCNC: 107 MMOL/L (ref 99–110)
CHOLEST SERPL-MCNC: 144 MG/DL (ref 0–199)
CO2 SERPL-SCNC: 24 MMOL/L (ref 21–32)
CREAT SERPL-MCNC: 0.6 MG/DL (ref 0.6–1.2)
DEPRECATED RDW RBC AUTO: 14.9 % (ref 12.4–15.4)
GFR SERPLBLD CREATININE-BSD FMLA CKD-EPI: >60 ML/MIN/{1.73_M2}
GLUCOSE SERPL-MCNC: 98 MG/DL (ref 70–99)
HCT VFR BLD AUTO: 41.5 % (ref 36–48)
HDLC SERPL-MCNC: 44 MG/DL (ref 40–60)
HGB BLD-MCNC: 13.8 G/DL (ref 12–16)
LDL CHOLESTEROL CALCULATED: 76 MG/DL
MCH RBC QN AUTO: 28.8 PG (ref 26–34)
MCHC RBC AUTO-ENTMCNC: 33.3 G/DL (ref 31–36)
MCV RBC AUTO: 86.4 FL (ref 80–100)
PLATELET # BLD AUTO: 255 K/UL (ref 135–450)
PMV BLD AUTO: 8.9 FL (ref 5–10.5)
POTASSIUM SERPL-SCNC: 4.5 MMOL/L (ref 3.5–5.1)
PROT SERPL-MCNC: 6.3 G/DL (ref 6.4–8.2)
RBC # BLD AUTO: 4.8 M/UL (ref 4–5.2)
SODIUM SERPL-SCNC: 144 MMOL/L (ref 136–145)
TRIGL SERPL-MCNC: 119 MG/DL (ref 0–150)
VLDLC SERPL CALC-MCNC: 24 MG/DL
WBC # BLD AUTO: 6.3 K/UL (ref 4–11)

## 2023-11-05 LAB
EST. AVERAGE GLUCOSE BLD GHB EST-MCNC: 108.3 MG/DL
HBA1C MFR BLD: 5.4 %

## 2023-11-16 ENCOUNTER — OFFICE VISIT (OUTPATIENT)
Dept: INTERNAL MEDICINE CLINIC | Age: 73
End: 2023-11-16

## 2023-11-16 VITALS
TEMPERATURE: 96.8 F | OXYGEN SATURATION: 98 % | DIASTOLIC BLOOD PRESSURE: 82 MMHG | WEIGHT: 266 LBS | BODY MASS INDEX: 41.75 KG/M2 | SYSTOLIC BLOOD PRESSURE: 130 MMHG | HEIGHT: 67 IN

## 2023-11-16 DIAGNOSIS — Z00.00 PREVENTATIVE HEALTH CARE: ICD-10-CM

## 2023-11-16 DIAGNOSIS — I10 PRIMARY HYPERTENSION: ICD-10-CM

## 2023-11-16 DIAGNOSIS — Z00.00 ANNUAL PHYSICAL EXAM: Primary | ICD-10-CM

## 2023-11-16 DIAGNOSIS — E55.9 VITAMIN D DEFICIENCY: ICD-10-CM

## 2023-11-16 DIAGNOSIS — R19.7 DIARRHEA, UNSPECIFIED TYPE: ICD-10-CM

## 2023-11-16 RX ORDER — MULTIVIT-MIN/IRON/FOLIC ACID/K 18-600-40
CAPSULE ORAL
COMMUNITY

## 2023-11-16 RX ORDER — LOSARTAN POTASSIUM 100 MG/1
100 TABLET ORAL DAILY
Qty: 90 TABLET | Refills: 3 | Status: SHIPPED | OUTPATIENT
Start: 2023-11-16

## 2023-11-16 RX ORDER — CHOLESTYRAMINE 4 G/9G
POWDER, FOR SUSPENSION ORAL
Qty: 180 PACKET | Refills: 1 | Status: SHIPPED | OUTPATIENT
Start: 2023-11-16

## 2023-11-16 ASSESSMENT — ENCOUNTER SYMPTOMS
SINUS PAIN: 0
SHORTNESS OF BREATH: 0
SINUS PRESSURE: 0
SORE THROAT: 0
ABDOMINAL DISTENTION: 0
VOMITING: 0
ABDOMINAL PAIN: 0
BLOOD IN STOOL: 0
NAUSEA: 0
VOICE CHANGE: 0
CHEST TIGHTNESS: 0
DIARRHEA: 1
COUGH: 0
CONSTIPATION: 0
TROUBLE SWALLOWING: 0
WHEEZING: 0
BACK PAIN: 1

## 2023-11-16 NOTE — PROGRESS NOTES
2023    Sirisha Renteria (:  1950) is a 68 y.o. female, here for a preventive medicine evaluation. Patient Active Problem List   Diagnosis    Hypertension    Migraine headache    Edema    Loose stools    Microhematuria    Lightheadedness    Vitamin D deficiency    Esophageal dysphagia    Degeneration of meniscus of right knee    Degenerative arthritis of right knee    Chronic low back pain    Tinnitus of left ear       Expand All Collapse All         [facility]  Follow-up visit   2022     Sirisha Renteria (:  1950) is a 67 y.o. female, here for follow-up:          Chief Complaint   Patient presents with    Other       Blood work follow up         HPI  Ms Vance Bettencourt is a 77-year-old female with relatively well-controlled problems here for management of these and annual physical.     Essential hypertension: This is a chronic problem. The problem is well controlled. Current therapies include life-style modification and pharmacologic therapy. Patient's blood pressure is very well controlled on losartan 100 mg daily. She is using this regularly without side effect. 2.  Diarrhea: Well controlled with Questran 4 g packets. Is using a packet every morning. 3.  Right Knee arthritis/meniscal degeneration: Patient initially saw Dr. Danielito Martinez in early  steroid injections. She then saw Dr. Guajardo Or.  Received 3 Synvisc injections into the right knee. She did not give relief from this. She uses Tylenol 3 times daily. Was prescribed meloxicam, but did not use this medication filled because she was concerned about the side effect of higher blood pressures. 4.  Degenerative Joint disease lumbar spine: The patient has been having work-up of this. She had an x-ray done on . MRI was ordered to investigate further but she chose not to pursue this. 5.  Vitamin D deficiency: Patient's vitamin D level on 2023 was 21.0.   I have recommended that she

## 2023-11-21 SDOH — HEALTH STABILITY: PHYSICAL HEALTH: ON AVERAGE, HOW MANY DAYS PER WEEK DO YOU ENGAGE IN MODERATE TO STRENUOUS EXERCISE (LIKE A BRISK WALK)?: 0 DAYS

## 2023-11-21 SDOH — HEALTH STABILITY: PHYSICAL HEALTH: ON AVERAGE, HOW MANY MINUTES DO YOU ENGAGE IN EXERCISE AT THIS LEVEL?: 0 MIN

## 2023-11-21 ASSESSMENT — SOCIAL DETERMINANTS OF HEALTH (SDOH)
WITHIN THE LAST YEAR, HAVE YOU BEEN KICKED, HIT, SLAPPED, OR OTHERWISE PHYSICALLY HURT BY YOUR PARTNER OR EX-PARTNER?: NO
WITHIN THE LAST YEAR, HAVE YOU BEEN AFRAID OF YOUR PARTNER OR EX-PARTNER?: NO
WITHIN THE LAST YEAR, HAVE YOU BEEN HUMILIATED OR EMOTIONALLY ABUSED IN OTHER WAYS BY YOUR PARTNER OR EX-PARTNER?: NO
WITHIN THE LAST YEAR, HAVE TO BEEN RAPED OR FORCED TO HAVE ANY KIND OF SEXUAL ACTIVITY BY YOUR PARTNER OR EX-PARTNER?: NO

## 2023-11-24 ENCOUNTER — OFFICE VISIT (OUTPATIENT)
Dept: ORTHOPEDIC SURGERY | Age: 73
End: 2023-11-24
Payer: COMMERCIAL

## 2023-11-24 VITALS — HEIGHT: 67 IN | WEIGHT: 266 LBS | BODY MASS INDEX: 41.75 KG/M2

## 2023-11-24 DIAGNOSIS — M17.12 PRIMARY OSTEOARTHRITIS OF LEFT KNEE: ICD-10-CM

## 2023-11-24 DIAGNOSIS — M25.562 LEFT KNEE PAIN, UNSPECIFIED CHRONICITY: ICD-10-CM

## 2023-11-24 DIAGNOSIS — M17.11 PRIMARY OSTEOARTHRITIS OF RIGHT KNEE: Primary | ICD-10-CM

## 2023-11-24 PROCEDURE — 20611 DRAIN/INJ JOINT/BURSA W/US: CPT | Performed by: PHYSICIAN ASSISTANT

## 2023-11-24 PROCEDURE — 1123F ACP DISCUSS/DSCN MKR DOCD: CPT | Performed by: PHYSICIAN ASSISTANT

## 2023-11-24 PROCEDURE — 99214 OFFICE O/P EST MOD 30 MIN: CPT | Performed by: PHYSICIAN ASSISTANT

## 2023-11-24 RX ORDER — BUPIVACAINE HYDROCHLORIDE 2.5 MG/ML
30 INJECTION, SOLUTION INFILTRATION; PERINEURAL ONCE
Status: COMPLETED | OUTPATIENT
Start: 2023-11-24 | End: 2023-11-24

## 2023-11-24 RX ORDER — TRIAMCINOLONE ACETONIDE 40 MG/ML
40 INJECTION, SUSPENSION INTRA-ARTICULAR; INTRAMUSCULAR ONCE
Status: COMPLETED | OUTPATIENT
Start: 2023-11-24 | End: 2023-11-24

## 2023-11-24 RX ORDER — LIDOCAINE HYDROCHLORIDE 10 MG/ML
5 INJECTION, SOLUTION INFILTRATION; PERINEURAL ONCE
Status: COMPLETED | OUTPATIENT
Start: 2023-11-24 | End: 2023-11-24

## 2023-11-24 RX ADMIN — TRIAMCINOLONE ACETONIDE 40 MG: 40 INJECTION, SUSPENSION INTRA-ARTICULAR; INTRAMUSCULAR at 14:17

## 2023-11-24 RX ADMIN — BUPIVACAINE HYDROCHLORIDE 75 MG: 2.5 INJECTION, SOLUTION INFILTRATION; PERINEURAL at 14:15

## 2023-11-24 RX ADMIN — LIDOCAINE HYDROCHLORIDE 5 ML: 10 INJECTION, SOLUTION INFILTRATION; PERINEURAL at 14:16

## 2023-11-24 NOTE — PROGRESS NOTES
Dr Eneida Hernandez      Date /Time 11/24/2023       2:35 PM EST  Name Tiffanie Eldridge             1950   Location  [facility]  MRN [id number]                Chief Complaint   Patient presents with    Knee Pain     Np Left Knee  Ck Right Knee - Euflexxa 08/102022        History of Present Illness  Tiffanie Eldridge is a 68 y.o. female who presents with  bilateral knee pain, . Sent in consultation by Nancy Horvath MD, . Injury Mechanism:  none. Worker's Comp. & legal issues:   none. Previous Treatments: Ice, Heat, and NSAIDs    Patient presents to the office today for follow-up visit. Patient has been treated for right knee osteoarthritis. She did receive viscosupplementation injections which ended on August 10, 2022. The injections did work well but her pain has returned without new injury or trauma. Her pain is mostly concentrated lateral.    Patient also would like to be seen for new problem. Patient here with a chief complaint of left medial knee pain. Again no injury or trauma.   Symptoms have been increasing for several months    Past History  Past Medical History:   Diagnosis Date    Acquired lymphedema of lower extremity     Esophageal reflux     Flushing     HTN (hypertension)     Idiopathic chronic venous hypertension of left lower extremity with inflammation     Labyrinthitis, unspecified     Lumbar disc disease     traumatic fall    Migraine     Pain in limb     left leg    Screening mammogram 02/29/2008    (Both)Negative     Past Surgical History:   Procedure Laterality Date    APPENDECTOMY      CHOLECYSTECTOMY      COLONOSCOPY  11/2014    negative - Dr Sean Quintero  December 2021    Cataracts    UPPER GASTROINTESTINAL ENDOSCOPY  09/2019    Small sliding hiatal hernia     Social History     Tobacco Use    Smoking status: Never     Passive exposure: Never    Smokeless tobacco: Never   Substance Use Topics    Alcohol use: No      Current Outpatient

## 2024-01-15 ENCOUNTER — TELEPHONE (OUTPATIENT)
Dept: INTERNAL MEDICINE CLINIC | Age: 74
End: 2024-01-15

## 2024-01-15 NOTE — TELEPHONE ENCOUNTER
Dr. Mccauley did a test on her L carotid artery last year (?)  He told her if she ever felt pain there to let him know.    She has been feeling pain for the last 4 -5 days on the L side, going up to ear and down to her shoulder blade.    Please advise what she should for this (more test, come in the office, go to hospital??      [facility]/pharmacy #[id number] - [place], [place] - [address] - P [phone] - F [phone]

## 2024-01-16 NOTE — TELEPHONE ENCOUNTER
Carotid Artery occulusion does not cause pain.  If she had trauma to the neck I would be worried about dissection and she should be seen in the ED.

## 2024-01-16 NOTE — TELEPHONE ENCOUNTER
Pt states that she is taking tylenol.     Pt states that the pain is coming and going and is wondering if the carotid could be worse. And should she be rechecked? Also she will be traveling by plaid and could the plane pressure make it worse?

## 2024-01-16 NOTE — TELEPHONE ENCOUNTER
The carotid ultrasounds done in July 2022 showed low to moderate levels of stenosis.  She should not have neck pain because of these findings.  I imagine her neck pain is most likely musculoskeletal.  She can use Tylenol for it as well as heat.  I can order physical therapy for her to do as well.

## 2024-05-04 DIAGNOSIS — I10 PRIMARY HYPERTENSION: ICD-10-CM

## 2024-05-04 DIAGNOSIS — E55.9 VITAMIN D DEFICIENCY: ICD-10-CM

## 2024-05-04 LAB
25(OH)D3 SERPL-MCNC: 15.4 NG/ML
ALBUMIN SERPL-MCNC: 4.1 G/DL (ref 3.4–5)
ALBUMIN/GLOB SERPL: 1.9 {RATIO} (ref 1.1–2.2)
ALP SERPL-CCNC: 104 U/L (ref 40–129)
ALT SERPL-CCNC: 13 U/L (ref 10–40)
ANION GAP SERPL CALCULATED.3IONS-SCNC: 14 MMOL/L (ref 3–16)
AST SERPL-CCNC: 12 U/L (ref 15–37)
BILIRUB SERPL-MCNC: 0.4 MG/DL (ref 0–1)
BUN SERPL-MCNC: 11 MG/DL (ref 7–20)
CALCIUM SERPL-MCNC: 9.3 MG/DL (ref 8.3–10.6)
CHLORIDE SERPL-SCNC: 105 MMOL/L (ref 99–110)
CO2 SERPL-SCNC: 22 MMOL/L (ref 21–32)
CREAT SERPL-MCNC: 0.5 MG/DL (ref 0.6–1.2)
DEPRECATED RDW RBC AUTO: 14.4 % (ref 12.4–15.4)
GFR SERPLBLD CREATININE-BSD FMLA CKD-EPI: >90 ML/MIN/{1.73_M2}
GLUCOSE SERPL-MCNC: 95 MG/DL (ref 70–99)
HCT VFR BLD AUTO: 42.1 % (ref 36–48)
HGB BLD-MCNC: 13.7 G/DL (ref 12–16)
MCH RBC QN AUTO: 28.7 PG (ref 26–34)
MCHC RBC AUTO-ENTMCNC: 32.6 G/DL (ref 31–36)
MCV RBC AUTO: 88.1 FL (ref 80–100)
PLATELET # BLD AUTO: 266 K/UL (ref 135–450)
PMV BLD AUTO: 9.1 FL (ref 5–10.5)
POTASSIUM SERPL-SCNC: 4.5 MMOL/L (ref 3.5–5.1)
PROT SERPL-MCNC: 6.3 G/DL (ref 6.4–8.2)
RBC # BLD AUTO: 4.77 M/UL (ref 4–5.2)
SODIUM SERPL-SCNC: 141 MMOL/L (ref 136–145)
WBC # BLD AUTO: 5.5 K/UL (ref 4–11)

## 2024-05-14 ASSESSMENT — PATIENT HEALTH QUESTIONNAIRE - PHQ9
SUM OF ALL RESPONSES TO PHQ QUESTIONS 1-9: 2
2. FEELING DOWN, DEPRESSED OR HOPELESS: SEVERAL DAYS
1. LITTLE INTEREST OR PLEASURE IN DOING THINGS: SEVERAL DAYS
SUM OF ALL RESPONSES TO PHQ9 QUESTIONS 1 & 2: 2
SUM OF ALL RESPONSES TO PHQ QUESTIONS 1-9: 2
2. FEELING DOWN, DEPRESSED OR HOPELESS: SEVERAL DAYS
SUM OF ALL RESPONSES TO PHQ QUESTIONS 1-9: 2
SUM OF ALL RESPONSES TO PHQ QUESTIONS 1-9: 2
SUM OF ALL RESPONSES TO PHQ9 QUESTIONS 1 & 2: 2
1. LITTLE INTEREST OR PLEASURE IN DOING THINGS: SEVERAL DAYS

## 2024-05-16 ENCOUNTER — OFFICE VISIT (OUTPATIENT)
Dept: INTERNAL MEDICINE CLINIC | Age: 74
End: 2024-05-16

## 2024-05-16 VITALS
SYSTOLIC BLOOD PRESSURE: 118 MMHG | OXYGEN SATURATION: 97 % | WEIGHT: 268 LBS | TEMPERATURE: 97.3 F | HEART RATE: 54 BPM | BODY MASS INDEX: 41.97 KG/M2 | DIASTOLIC BLOOD PRESSURE: 82 MMHG

## 2024-05-16 DIAGNOSIS — R19.7 DIARRHEA, UNSPECIFIED TYPE: ICD-10-CM

## 2024-05-16 DIAGNOSIS — M54.50 CHRONIC BILATERAL LOW BACK PAIN WITHOUT SCIATICA: ICD-10-CM

## 2024-05-16 DIAGNOSIS — Z00.00 PREVENTATIVE HEALTH CARE: ICD-10-CM

## 2024-05-16 DIAGNOSIS — I10 PRIMARY HYPERTENSION: Primary | ICD-10-CM

## 2024-05-16 DIAGNOSIS — G89.29 CHRONIC BILATERAL LOW BACK PAIN WITHOUT SCIATICA: ICD-10-CM

## 2024-05-16 DIAGNOSIS — R60.9 EDEMA, UNSPECIFIED TYPE: ICD-10-CM

## 2024-05-16 DIAGNOSIS — M17.11 PRIMARY OSTEOARTHRITIS OF RIGHT KNEE: ICD-10-CM

## 2024-05-16 DIAGNOSIS — E55.9 VITAMIN D DEFICIENCY: ICD-10-CM

## 2024-05-16 RX ORDER — TIZANIDINE 2 MG/1
2 TABLET ORAL 3 TIMES DAILY PRN
Qty: 30 TABLET | Refills: 0 | Status: SHIPPED | OUTPATIENT
Start: 2024-05-16

## 2024-05-16 RX ORDER — LOSARTAN POTASSIUM 100 MG/1
100 TABLET ORAL DAILY
Qty: 90 TABLET | Refills: 3 | Status: SHIPPED | OUTPATIENT
Start: 2024-05-16

## 2024-05-16 RX ORDER — CHOLESTYRAMINE 4 G/9G
POWDER, FOR SUSPENSION ORAL
Qty: 180 PACKET | Refills: 1 | Status: SHIPPED | OUTPATIENT
Start: 2024-05-16

## 2024-05-16 SDOH — ECONOMIC STABILITY: FOOD INSECURITY: WITHIN THE PAST 12 MONTHS, YOU WORRIED THAT YOUR FOOD WOULD RUN OUT BEFORE YOU GOT MONEY TO BUY MORE.: NEVER TRUE

## 2024-05-16 SDOH — ECONOMIC STABILITY: FOOD INSECURITY: WITHIN THE PAST 12 MONTHS, THE FOOD YOU BOUGHT JUST DIDN'T LAST AND YOU DIDN'T HAVE MONEY TO GET MORE.: NEVER TRUE

## 2024-05-16 SDOH — ECONOMIC STABILITY: INCOME INSECURITY: HOW HARD IS IT FOR YOU TO PAY FOR THE VERY BASICS LIKE FOOD, HOUSING, MEDICAL CARE, AND HEATING?: NOT HARD AT ALL

## 2024-05-16 ASSESSMENT — ENCOUNTER SYMPTOMS
NAUSEA: 0
TROUBLE SWALLOWING: 0
ABDOMINAL DISTENTION: 0
SINUS PAIN: 0
WHEEZING: 0
BLOOD IN STOOL: 0
SORE THROAT: 0
VOMITING: 0
SINUS PRESSURE: 0
SHORTNESS OF BREATH: 0
BACK PAIN: 1
VOICE CHANGE: 0
DIARRHEA: 0
CONSTIPATION: 0

## 2024-05-16 NOTE — PROGRESS NOTES
Sexually Abused: No   Housing Stability: Unknown (5/16/2024)    Housing Stability Vital Sign     Unable to Pay for Housing in the Last Year: Not on file     Number of Places Lived in the Last Year: Not on file     Unstable Housing in the Last Year: No      Family History   Problem Relation Age of Onset    Cancer Mother         skin, SCC    Diabetes Mother     Hearing Loss Mother     Heart Disease Mother         Pace maker    High Blood Pressure Mother        PE  Vitals:    05/16/24 0956   BP: 118/82   Site: Left Upper Arm   Position: Sitting   Pulse: 54   Temp: 97.3 °F (36.3 °C)   SpO2: 97%   Weight: 121.6 kg (268 lb)     Estimated body mass index is 41.97 kg/m² as calculated from the following:    Height as of 11/24/23: 1.702 m (5' 7\").    Weight as of this encounter: 121.6 kg (268 lb).    Physical Exam  Vitals reviewed.   Constitutional:       General: She is not in acute distress.     Appearance: Normal appearance. She is obese.   HENT:      Head: Normocephalic and atraumatic.      Mouth/Throat:      Pharynx: Oropharynx is clear.   Eyes:      Conjunctiva/sclera: Conjunctivae normal.      Pupils: Pupils are equal, round, and reactive to light.   Cardiovascular:      Rate and Rhythm: Normal rate and regular rhythm.      Pulses: Normal pulses.      Heart sounds: Normal heart sounds.   Pulmonary:      Effort: Pulmonary effort is normal. No respiratory distress.      Breath sounds: Normal breath sounds. No wheezing or rales.   Abdominal:      Palpations: Abdomen is soft.      Tenderness: There is no abdominal tenderness. There is no rebound.   Musculoskeletal:         General: No signs of injury. Normal range of motion.      Cervical back: Normal range of motion and neck supple.   Skin:     General: Skin is warm and dry.      Coloration: Skin is not jaundiced.   Neurological:      General: No focal deficit present.      Mental Status: She is alert and oriented to person, place, and time.   Psychiatric:

## 2024-08-30 ENCOUNTER — OFFICE VISIT (OUTPATIENT)
Age: 74
End: 2024-08-30

## 2024-08-30 VITALS
RESPIRATION RATE: 18 BRPM | TEMPERATURE: 97.5 F | HEART RATE: 86 BPM | OXYGEN SATURATION: 93 % | DIASTOLIC BLOOD PRESSURE: 78 MMHG | SYSTOLIC BLOOD PRESSURE: 142 MMHG

## 2024-08-30 DIAGNOSIS — R09.81 SINUS CONGESTION: ICD-10-CM

## 2024-08-30 DIAGNOSIS — I10 ELEVATED BLOOD PRESSURE READING WITH DIAGNOSIS OF HYPERTENSION: ICD-10-CM

## 2024-08-30 DIAGNOSIS — J01.10 ACUTE NON-RECURRENT FRONTAL SINUSITIS: Primary | ICD-10-CM

## 2024-08-30 LAB
Lab: NORMAL
QC PASS/FAIL: NORMAL
SARS-COV-2 RDRP RESP QL NAA+PROBE: NEGATIVE

## 2024-08-30 RX ORDER — FLUTICASONE PROPIONATE 50 MCG
2 SPRAY, SUSPENSION (ML) NASAL DAILY
Qty: 16 G | Refills: 0 | Status: SHIPPED | OUTPATIENT
Start: 2024-08-30

## 2024-08-30 ASSESSMENT — ENCOUNTER SYMPTOMS
BACK PAIN: 0
SORE THROAT: 1
NAUSEA: 0
SINUS PAIN: 1
SINUS PRESSURE: 1
ABDOMINAL PAIN: 0
DIARRHEA: 0
RHINORRHEA: 1
VOMITING: 0
COUGH: 1
SHORTNESS OF BREATH: 0

## 2024-08-30 NOTE — PATIENT INSTRUCTIONS
New Prescriptions    AMOXICILLIN-CLAVULANATE (AUGMENTIN) 875-125 MG PER TABLET    Take 1 tablet by mouth 2 times daily for 7 days    FLUTICASONE (FLONASE) 50 MCG/ACT NASAL SPRAY    2 sprays by Each Nostril route daily      Take antibiotics as prescribed.  Use the nasal spray as needed for symptom relief.  Take tylenol and/or ibuprofen for pain/fever relief.  Encourage rest and increase fluid intake.  Follow-up with PCP as needed.  Return for severe/worsening symptoms.

## 2024-08-30 NOTE — PROGRESS NOTES
Aide Tobias (:  1950) is a 74 y.o. female,New patient, here for evaluation of the following chief complaint(s):  Sinusitis (Pt states she started with a sore throat on Monday that went away and now she has a lot of sinus congestion. Pt took a home covid test on Monday and Wednesday that came back negative.)      Assessment & Plan :  Visit Diagnoses and Associated Orders       Acute non-recurrent frontal sinusitis    -  Primary    amoxicillin-clavulanate (AUGMENTIN) 875-125 MG per tablet [83215]      fluticasone (FLONASE) 50 MCG/ACT nasal spray [59130]           Sinus congestion        POCT COVID-19 Rapid, NAAT [ZAN816 Custom]           Elevated blood pressure reading with diagnosis of hypertension                   No results found for this visit on 24.    Differential diagnoses: bronchitis, pneumonia, sinusitis, strep pharyngitis, viral pharyngitis, viral URI, allergic rhinitis, covid, influenza, otitis media, tonsillar abscess     Plan: Covid test negative today in the office. She appears to have an acute sinus infection. She will be prescribed Augmentin for the sinus infection. She was also prescribed Flonase for symptom relief. Advised to continue to take tylenol and/or ibuprofen for pain/fever relief. Encouraged to rest and increase fluid intake and to follow-up with your PCP as needed. Return precautions discussed. Follow up in 3 days if symptoms persist or if symptoms worsen.       Subjective :  HPI  Aide Tobias is a 74 y.o. female who presents with complaints of sinus pain and pressure. She reports that she started with a sore throat on Monday. She states that by Monday evening her sore throat resolved but started with sinus pain and pressure and nasal congestion. She has also had a significant runny nose as well. She denies any color in her nasal discharge. On Wednesday she has a nonproductive, mild cough which has resolved. She relates that cough to post nasal drip. She

## 2024-09-21 DIAGNOSIS — J01.10 ACUTE NON-RECURRENT FRONTAL SINUSITIS: ICD-10-CM

## 2024-09-25 RX ORDER — FLUTICASONE PROPIONATE 50 MCG
2 SPRAY, SUSPENSION (ML) NASAL DAILY
Refills: 1 | OUTPATIENT
Start: 2024-09-25

## 2024-11-16 DIAGNOSIS — E55.9 VITAMIN D DEFICIENCY: ICD-10-CM

## 2024-11-16 DIAGNOSIS — I10 PRIMARY HYPERTENSION: ICD-10-CM

## 2024-11-16 DIAGNOSIS — Z00.00 PREVENTATIVE HEALTH CARE: ICD-10-CM

## 2024-11-16 LAB
25(OH)D3 SERPL-MCNC: 15 NG/ML
ALBUMIN SERPL-MCNC: 4.2 G/DL (ref 3.4–5)
ALBUMIN/GLOB SERPL: 2 {RATIO} (ref 1.1–2.2)
ALP SERPL-CCNC: 110 U/L (ref 40–129)
ALT SERPL-CCNC: 17 U/L (ref 10–40)
ANION GAP SERPL CALCULATED.3IONS-SCNC: 13 MMOL/L (ref 3–16)
AST SERPL-CCNC: 19 U/L (ref 15–37)
BILIRUB SERPL-MCNC: 0.4 MG/DL (ref 0–1)
BUN SERPL-MCNC: 10 MG/DL (ref 7–20)
CALCIUM SERPL-MCNC: 9.3 MG/DL (ref 8.3–10.6)
CHLORIDE SERPL-SCNC: 107 MMOL/L (ref 99–110)
CHOLEST SERPL-MCNC: 143 MG/DL (ref 0–199)
CO2 SERPL-SCNC: 23 MMOL/L (ref 21–32)
CREAT SERPL-MCNC: 0.6 MG/DL (ref 0.6–1.2)
DEPRECATED RDW RBC AUTO: 15 % (ref 12.4–15.4)
GFR SERPLBLD CREATININE-BSD FMLA CKD-EPI: >90 ML/MIN/{1.73_M2}
GLUCOSE SERPL-MCNC: 82 MG/DL (ref 70–99)
HCT VFR BLD AUTO: 41.2 % (ref 36–48)
HDLC SERPL-MCNC: 49 MG/DL (ref 40–60)
HGB BLD-MCNC: 13.9 G/DL (ref 12–16)
LDL CHOLESTEROL: 70 MG/DL
MCH RBC QN AUTO: 29.7 PG (ref 26–34)
MCHC RBC AUTO-ENTMCNC: 33.6 G/DL (ref 31–36)
MCV RBC AUTO: 88.4 FL (ref 80–100)
PLATELET # BLD AUTO: 251 K/UL (ref 135–450)
PMV BLD AUTO: 8.5 FL (ref 5–10.5)
POTASSIUM SERPL-SCNC: 4.4 MMOL/L (ref 3.5–5.1)
PROT SERPL-MCNC: 6.3 G/DL (ref 6.4–8.2)
RBC # BLD AUTO: 4.66 M/UL (ref 4–5.2)
SODIUM SERPL-SCNC: 143 MMOL/L (ref 136–145)
TRIGL SERPL-MCNC: 122 MG/DL (ref 0–150)
TSH SERPL DL<=0.005 MIU/L-ACNC: 0.85 UIU/ML (ref 0.27–4.2)
VLDLC SERPL CALC-MCNC: 24 MG/DL
WBC # BLD AUTO: 5.1 K/UL (ref 4–11)

## 2024-11-17 LAB
EST. AVERAGE GLUCOSE BLD GHB EST-MCNC: 102.5 MG/DL
HBA1C MFR BLD: 5.2 %

## 2024-11-20 ENCOUNTER — OFFICE VISIT (OUTPATIENT)
Dept: INTERNAL MEDICINE CLINIC | Age: 74
End: 2024-11-20

## 2024-11-20 VITALS
BODY MASS INDEX: 41.38 KG/M2 | TEMPERATURE: 97.1 F | DIASTOLIC BLOOD PRESSURE: 87 MMHG | OXYGEN SATURATION: 95 % | HEART RATE: 73 BPM | WEIGHT: 264.2 LBS | SYSTOLIC BLOOD PRESSURE: 130 MMHG

## 2024-11-20 DIAGNOSIS — Z00.00 INITIAL MEDICARE ANNUAL WELLNESS VISIT: Primary | ICD-10-CM

## 2024-11-20 DIAGNOSIS — I73.9 PERIPHERAL ARTERIAL DISEASE (HCC): ICD-10-CM

## 2024-11-20 DIAGNOSIS — R19.7 DIARRHEA, UNSPECIFIED TYPE: ICD-10-CM

## 2024-11-20 DIAGNOSIS — E55.9 VITAMIN D DEFICIENCY: ICD-10-CM

## 2024-11-20 DIAGNOSIS — Z12.11 ENCOUNTER FOR COLORECTAL CANCER SCREENING: ICD-10-CM

## 2024-11-20 DIAGNOSIS — K21.9 GASTROESOPHAGEAL REFLUX DISEASE, UNSPECIFIED WHETHER ESOPHAGITIS PRESENT: ICD-10-CM

## 2024-11-20 DIAGNOSIS — Z23 NEED FOR INFLUENZA VACCINATION: ICD-10-CM

## 2024-11-20 DIAGNOSIS — I10 PRIMARY HYPERTENSION: ICD-10-CM

## 2024-11-20 DIAGNOSIS — Z12.12 ENCOUNTER FOR COLORECTAL CANCER SCREENING: ICD-10-CM

## 2024-11-20 RX ORDER — OMEPRAZOLE 40 MG/1
40 CAPSULE, DELAYED RELEASE ORAL DAILY
Qty: 90 CAPSULE | Refills: 1 | Status: SHIPPED | OUTPATIENT
Start: 2024-11-20

## 2024-11-20 RX ORDER — LOSARTAN POTASSIUM 100 MG/1
100 TABLET ORAL DAILY
Qty: 90 TABLET | Refills: 3 | Status: SHIPPED | OUTPATIENT
Start: 2024-11-20

## 2024-11-20 RX ORDER — CHOLESTYRAMINE 4 G/9G
1 POWDER, FOR SUSPENSION ORAL DAILY
Qty: 90 PACKET | Refills: 1 | Status: SHIPPED | OUTPATIENT
Start: 2024-11-20 | End: 2025-05-19

## 2024-11-20 ASSESSMENT — PATIENT HEALTH QUESTIONNAIRE - PHQ9
SUM OF ALL RESPONSES TO PHQ QUESTIONS 1-9: 0
SUM OF ALL RESPONSES TO PHQ QUESTIONS 1-9: 0
2. FEELING DOWN, DEPRESSED OR HOPELESS: NOT AT ALL
SUM OF ALL RESPONSES TO PHQ9 QUESTIONS 1 & 2: 0
1. LITTLE INTEREST OR PLEASURE IN DOING THINGS: NOT AT ALL
SUM OF ALL RESPONSES TO PHQ QUESTIONS 1-9: 0
SUM OF ALL RESPONSES TO PHQ QUESTIONS 1-9: 0

## 2024-11-20 ASSESSMENT — LIFESTYLE VARIABLES
HOW MANY STANDARD DRINKS CONTAINING ALCOHOL DO YOU HAVE ON A TYPICAL DAY: PATIENT DOES NOT DRINK
HOW OFTEN DO YOU HAVE A DRINK CONTAINING ALCOHOL: NEVER

## 2024-11-20 NOTE — PROGRESS NOTES
Medicare Annual Wellness Visit    Aide Tobias is here for Medicare AWV    Assessment & Plan   Initial Medicare annual wellness visit  Diarrhea, unspecified type  -     cholestyramine (QUESTRAN) 4 g packet; Take 1 packet by mouth daily USE ONE PACKET IN WATER 2 TIMES DAILYUSE ONE PACKET IN WATER 2 TIMES DAILY, Disp-90 packet, R-1Print  Peripheral arterial disease (HCC)  Primary hypertension  -     losartan (COZAAR) 100 MG tablet; Take 1 tablet by mouth daily, Disp-90 tablet, R-3Normal  Vitamin D deficiency  Gastroesophageal reflux disease, unspecified whether esophagitis present  -     omeprazole (PRILOSEC) 40 MG delayed release capsule; Take 1 capsule by mouth daily, Disp-90 capsule, R-1Normal  Encounter for colorectal cancer screening  -     [facility] - Kavitha Pro MD, Gastroenterology ([place]), [place]    Recommendations for Preventive Services Due: see orders and patient instructions/AVS.  Recommended screening schedule for the next 5-10 years is provided to the patient in written form: see Patient Instructions/AVS.     No follow-ups on file.     Subjective   The following acute and/or chronic problems were also addressed today:    In conjunction with this visit I independently interpreted the 11/16/2024 labs.  I discussed my impressions with the patient.  The patient had a normal CBC, almost completely normal comprehensive metabolic profile, lipid profile was at goal, TSH was normal, hemoglobin A1c was normal.  Her vitamin D level however was very low at 15.      Essential hypertension: This is a chronic problem.  The disease course is stable.  The problem is well controlled.  Current therapies include life-style modification and pharmacologic therapy.  Patient's blood pressure is very well controlled on losartan 100 mg daily.  She is using this regularly without side effect.  Associated conditions include obesity.  Counseled weight loss, reduced sodium diet, regular cardiovascular exercise.    2.

## 2025-01-07 ENCOUNTER — TELEPHONE (OUTPATIENT)
Dept: INTERNAL MEDICINE CLINIC | Age: 75
End: 2025-01-07

## 2025-01-07 DIAGNOSIS — M17.11 OSTEOARTHRITIS OF RIGHT KNEE, UNSPECIFIED OSTEOARTHRITIS TYPE: Primary | ICD-10-CM

## 2025-01-07 DIAGNOSIS — M54.50 CHRONIC LOW BACK PAIN, UNSPECIFIED BACK PAIN LATERALITY, UNSPECIFIED WHETHER SCIATICA PRESENT: ICD-10-CM

## 2025-01-07 DIAGNOSIS — G89.29 CHRONIC LOW BACK PAIN, UNSPECIFIED BACK PAIN LATERALITY, UNSPECIFIED WHETHER SCIATICA PRESENT: ICD-10-CM

## 2025-01-07 NOTE — TELEPHONE ENCOUNTER
Pt called stated she went to go get a lift chair and she needs a RX sent to [facility] so its covered.     Fax to: [phone]

## 2025-03-25 ENCOUNTER — RESULTS FOLLOW-UP (OUTPATIENT)
Dept: INTERNAL MEDICINE CLINIC | Age: 75
End: 2025-03-25

## 2025-06-02 PROBLEM — H93.12 TINNITUS OF LEFT EAR: Status: RESOLVED | Noted: 2022-07-12 | Resolved: 2025-06-02

## 2025-06-02 PROBLEM — M23.306 DEGENERATION OF MENISCUS OF RIGHT KNEE: Status: RESOLVED | Noted: 2020-01-31 | Resolved: 2025-06-02

## 2025-06-02 PROBLEM — R13.19 ESOPHAGEAL DYSPHAGIA: Status: RESOLVED | Noted: 2019-08-05 | Resolved: 2025-06-02

## 2025-06-02 PROBLEM — G89.29 CHRONIC LOW BACK PAIN: Status: RESOLVED | Noted: 2021-03-05 | Resolved: 2025-06-02

## 2025-06-02 PROBLEM — M54.50 CHRONIC LOW BACK PAIN: Status: RESOLVED | Noted: 2021-03-05 | Resolved: 2025-06-02

## 2025-06-17 ENCOUNTER — HOSPITAL ENCOUNTER (OUTPATIENT)
Dept: PHYSICAL THERAPY | Age: 75
Setting detail: THERAPIES SERIES
Discharge: HOME | End: 2025-06-17
Attending: INTERNAL MEDICINE
Payer: MEDICARE

## 2025-06-17 DIAGNOSIS — R26.89 DECREASED FUNCTIONAL MOBILITY: ICD-10-CM

## 2025-06-17 DIAGNOSIS — R53.1 FUNCTIONAL WEAKNESS: ICD-10-CM

## 2025-06-17 DIAGNOSIS — Z78.9 NEED FOR HOME EXERCISE PROGRAM: ICD-10-CM

## 2025-06-17 DIAGNOSIS — R68.89 DECREASED FUNCTIONAL ACTIVITY TOLERANCE: ICD-10-CM

## 2025-06-17 DIAGNOSIS — R68.89 DECREASED EXERCISE TOLERANCE: ICD-10-CM

## 2025-06-17 DIAGNOSIS — R68.89 DECREASED ABILITY TO PERFORM ACTIVITIES: ICD-10-CM

## 2025-06-17 DIAGNOSIS — R26.2 DIFFICULTY WALKING: Primary | ICD-10-CM

## 2025-06-17 PROCEDURE — 97161 PT EVAL LOW COMPLEX 20 MIN: CPT | Performed by: PHYSICAL THERAPIST

## 2025-06-17 PROCEDURE — 97530 THERAPEUTIC ACTIVITIES: CPT | Performed by: PHYSICAL THERAPIST

## 2025-06-17 PROCEDURE — 97110 THERAPEUTIC EXERCISES: CPT | Performed by: PHYSICAL THERAPIST

## 2025-06-17 NOTE — PLAN OF CARE
[facility] - Outpatient Rehabilitation and Therapy: [address], [place] [address] office: [phone] fax: [phone]     Physical Therapy Initial Evaluation Certification      Dear Henry Dailey, DO ,I think this patient may benefit from dry needling and may attempt it at some point. .  Please sign the following if you are in agreement with this.  If you have any reservations or questions please contact me at [phone]  Thanks, Sincerely, Ramon Dunlap PT      We had the pleasure of evaluating the following patient for physical therapy services at [facility].  A summary of our findings can be found in the initial assessment below.  This includes our plan of care.  If you have any questions or concerns regarding these findings, please do not hesitate to contact me at the office phone number listed above.  Thank you for the referral.     Physician Signature:_______________________________Date:__________________  By signing above (or electronic signature), therapist’s plan is approved by physician       Physical Therapy: TREATMENT/PROGRESS NOTE   Patient: Aide Tobias (75 y.o. female)   Examination Date: 2025   :  1950 MRN: [id number]   Visit #:   Insurance Allowable Auth Needed   mn []Yes    [x]No    Insurance: Payor: MEDICARE / Plan: MEDICARE PART A AND B / Product Type: *No Product type* /   Insurance ID: [id number] - (Medicare)  Secondary Insurance (if applicable): [facility]   Treatment Diagnosis:     ICD-10-CM    1. Difficulty walking  R26.2       2. Decreased functional mobility  R26.89       3. Decreased functional activity tolerance  R68.89       4. Functional weakness  R53.1       5. Decreased ability to perform activities  R68.89       6. Need for home exercise program  Z78.9       7. Decreased exercise tolerance  R68.89          Medical Diagnosis:  Tendinitis of right hip flexor [M76.891]  Muscle spasm

## 2025-06-26 ENCOUNTER — APPOINTMENT (OUTPATIENT)
Dept: PHYSICAL THERAPY | Age: 75
End: 2025-06-26
Attending: INTERNAL MEDICINE
Payer: MEDICARE

## 2025-07-01 ENCOUNTER — HOSPITAL ENCOUNTER (OUTPATIENT)
Dept: PHYSICAL THERAPY | Age: 75
Setting detail: THERAPIES SERIES
Discharge: HOME | End: 2025-07-01
Attending: INTERNAL MEDICINE
Payer: MEDICARE

## 2025-07-01 PROCEDURE — 97140 MANUAL THERAPY 1/> REGIONS: CPT | Performed by: PHYSICAL THERAPIST

## 2025-07-01 PROCEDURE — 97110 THERAPEUTIC EXERCISES: CPT | Performed by: PHYSICAL THERAPIST

## 2025-07-01 NOTE — FLOWSHEET NOTE
[facility] - Outpatient Rehabilitation and Therapy: [address], [address], [place] [address] office: [phone] fax: [phone]         Physical Therapy: TREATMENT/PROGRESS NOTE   Patient: Aide Tobias (75 y.o. female)   Examination Date: 2025   :  1950 MRN: [id number]   Visit #:   Insurance Allowable Auth Needed   mn []Yes    [x]No    Insurance: Payor: MEDICARE / Plan: MEDICARE PART A AND B / Product Type: *No Product type* /   Insurance ID: [id number] - (Medicare)  Secondary Insurance (if applicable): [facility]   Treatment Diagnosis:     ICD-10-CM    1. Difficulty walking  R26.2       2. Decreased functional mobility  R26.89       3. Decreased functional activity tolerance  R68.89       4. Functional weakness  R53.1       5. Decreased ability to perform activities  R68.89       6. Need for home exercise program  Z78.9       7. Decreased exercise tolerance  R68.89          Medical Diagnosis:  Tendinitis of right hip flexor [M76.891]  Muscle spasm [M62.838]   Referring Physician: Henry Dailey DO  PCP: Henry Dailey DO     Plan of care signed (Y/N): rouoted    Date of Patient follow up with Physician:      Plan of Care Report: EVAL today  POC update due: (10 visits /OR AUTH LIMITS, whichever is less)  2025                                             Medical History:  Medical History    Diagnosis Date Comment Source   HTN (hypertension)         Other Medical History    Diagnosis Date Comment Source   Acquired lymphedema of lower extremity      Esophageal reflux      Flushing      Idiopathic chronic venous hypertension of left lower extremity with inflammation      Labyrinthitis, unspecified      Lumbar disc disease  traumatic fall    Migraine      Pain in limb  left leg    Screening mammogram 2008 (Both)Negative                                                Precautions/ Contra-indications:           Latex allergy:  NO  Pacemaker:

## 2025-07-03 ENCOUNTER — HOSPITAL ENCOUNTER (OUTPATIENT)
Dept: PHYSICAL THERAPY | Age: 75
Setting detail: THERAPIES SERIES
Discharge: HOME | End: 2025-07-03
Attending: INTERNAL MEDICINE
Payer: MEDICARE

## 2025-07-03 PROCEDURE — 97110 THERAPEUTIC EXERCISES: CPT | Performed by: PHYSICAL THERAPIST

## 2025-07-03 PROCEDURE — 97140 MANUAL THERAPY 1/> REGIONS: CPT | Performed by: PHYSICAL THERAPIST

## 2025-07-03 NOTE — FLOWSHEET NOTE
[facility] - Outpatient Rehabilitation and Therapy: [address], [address], [place] [address] office: [phone] fax: [phone]         Physical Therapy: TREATMENT/PROGRESS NOTE   Patient: Aide Tobias (75 y.o. female)   Examination Date: 2025   :  1950 MRN: [id number]   Visit #: 3 /16  Insurance Allowable Auth Needed   mn []Yes    [x]No    Insurance: Payor: MEDICARE / Plan: MEDICARE PART A AND B / Product Type: *No Product type* /   Insurance ID: [id number] - (Medicare)  Secondary Insurance (if applicable): [facility]   Treatment Diagnosis:     ICD-10-CM    1. Difficulty walking  R26.2       2. Decreased functional mobility  R26.89       3. Decreased functional activity tolerance  R68.89       4. Functional weakness  R53.1       5. Decreased ability to perform activities  R68.89       6. Need for home exercise program  Z78.9       7. Decreased exercise tolerance  R68.89          Medical Diagnosis:  Tendinitis of right hip flexor [M76.891]  Muscle spasm [M62.838]   Referring Physician: Henry Dailey DO  PCP: Henry Dailey DO     Plan of care signed (Y/N): rouoted    Date of Patient follow up with Physician:      Plan of Care Report: EVAL today  POC update due: (10 visits /OR AUTH LIMITS, whichever is less)  2025                                             Medical History:  Medical History    Diagnosis Date Comment Source   HTN (hypertension)         Other Medical History    Diagnosis Date Comment Source   Acquired lymphedema of lower extremity      Esophageal reflux      Flushing      Idiopathic chronic venous hypertension of left lower extremity with inflammation      Labyrinthitis, unspecified      Lumbar disc disease  traumatic fall    Migraine      Pain in limb  left leg    Screening mammogram 2008 (Both)Negative                                                Precautions/ Contra-indications:           Latex allergy:  NO  Pacemaker:

## 2025-07-08 ENCOUNTER — HOSPITAL ENCOUNTER (OUTPATIENT)
Dept: PHYSICAL THERAPY | Age: 75
Setting detail: THERAPIES SERIES
Discharge: HOME | End: 2025-07-08
Attending: INTERNAL MEDICINE
Payer: MEDICARE

## 2025-07-08 PROCEDURE — 97140 MANUAL THERAPY 1/> REGIONS: CPT | Performed by: PHYSICAL THERAPIST

## 2025-07-08 PROCEDURE — 97110 THERAPEUTIC EXERCISES: CPT | Performed by: PHYSICAL THERAPIST

## 2025-07-08 NOTE — FLOWSHEET NOTE
30     Ppt with ball X 30    clams X 30    Reverse clams X 30     Standing knee flexion X 20     Standing up straight  X 2 min    Table hip ext X 2 min    Standing hip ext X 20 ea    Wall slide     Seated thoracic ext X 20 with towel roll                         Manual Intervention (92318)     Gentle hip distraction withi pillow under knee, hip rotation prom , hip flex stretch partially off table  11                        NMR re-education (88521) resistance CUES NEEDED                            Therapeutic Activity (68284)     Discussed mechanism of injury, anatomy, physiology, biomechanics, sleeping positions,  and strategies to accelerate the healing process. Answered all of patients questions regarding injury.  Gave necessary information to get any equipment needed.                             Modalities:    No modalities applied this session    Education/Home Exercise Program:   Access Code: [email or web address]  URL: [email or web address]  Date: 06/17/2025  Prepared by: Ramon Dunlap    Exercises  - Supine Bridge  - 1 x daily - 7 x weekly - 3 sets - 10 reps  - Clamshell  - 1 x daily - 7 x weekly - 3 sets - 10 reps  - hip extension  - 1 x daily - 7 x weekly - 3 sets - 10 reps  - Standing Hip Abduction with Counter Support  - 1 x daily - 7 x weekly - 3 sets - 10 reps  - Standing Knee Flexion with Counter Support  - 1 x daily - 7 x weekly - 3 sets - 10 reps    ASSESSMENT   Assessment:   Aide Tobias is a 75 y.o. female presenting today to Outpatient PT with signs and symptoms consistent with possible labral or hip joint deterioration. Does have some hip flex pain but resisted hip flexion is not painful.  Fabers, Fadirs, and hip clearing positive along with severe decrease in ir. .    Pt. presents with the functional impairments and activity limitations listed below and would benefit from Outpatient PT to address the below impairments as well as improve pain, and restore function.   7/1/25  Tolerated exs fair, she

## 2025-07-10 ENCOUNTER — HOSPITAL ENCOUNTER (OUTPATIENT)
Dept: PHYSICAL THERAPY | Age: 75
Setting detail: THERAPIES SERIES
Discharge: HOME | End: 2025-07-10
Attending: INTERNAL MEDICINE
Payer: MEDICARE

## 2025-07-10 PROCEDURE — 97140 MANUAL THERAPY 1/> REGIONS: CPT | Performed by: PHYSICAL THERAPIST

## 2025-07-10 PROCEDURE — 97110 THERAPEUTIC EXERCISES: CPT | Performed by: PHYSICAL THERAPIST

## 2025-07-10 NOTE — FLOWSHEET NOTE
Motion, Gr I-IV mobilizations, Soft Tissue Mobilization, Dry Needling/IASTM, Trigger Point Release, and Myofascial Release  Modalities as needed that may include: Cryotherapy  Patient education on joint protection, postural re-education, activity modification, and progression of HEP    Plan: POC initiated as per evaluationLE arom, prom  strength, proprioception, gait, balance, functional activities.  Mfr, joint mobs, mods as needed, hep. Progress as tolerated, Work on posture, gait, balance, hip flex release, hip jnt mobs   7/1/25  Work on hip flex stretches, standing hip ext, knee ext  7/3/25  Continue to try to get more hip/knee ext   7/8/25  Increase posterior chain exs  7/10/25  try tband row      Electronically Signed by Usman Dunlap, PT  Date: 07/10/2025   Note: Portions of this note have been templated and/or copied from initial evaluation, reassessments and prior notes for documentation efficiency.  Note: If patient does not return for scheduled/recommended follow up visits, this note will serve as a discharge from care along with the most recent update on progress.    Ortho Evaluation

## 2025-07-15 ENCOUNTER — HOSPITAL ENCOUNTER (OUTPATIENT)
Dept: PHYSICAL THERAPY | Age: 75
Setting detail: THERAPIES SERIES
Discharge: HOME | End: 2025-07-15
Attending: INTERNAL MEDICINE
Payer: MEDICARE

## 2025-07-15 PROCEDURE — 97140 MANUAL THERAPY 1/> REGIONS: CPT | Performed by: PHYSICAL THERAPIST

## 2025-07-15 PROCEDURE — 97110 THERAPEUTIC EXERCISES: CPT | Performed by: PHYSICAL THERAPIST

## 2025-07-15 NOTE — FLOWSHEET NOTE
[facility] - Outpatient Rehabilitation and Therapy: [address], [address], [place] [address] office: [phone] fax: [phone]         Physical Therapy: TREATMENT/PROGRESS NOTE   Patient: Aide Tobias (75 y.o. female)   Examination Date: 07/15/2025   :  1950 MRN: [id number]   Visit #:   Insurance Allowable Auth Needed   mn []Yes    [x]No    Insurance: Payor: MEDICARE / Plan: MEDICARE PART A AND B / Product Type: *No Product type* /   Insurance ID: [id number] - (Medicare)  Secondary Insurance (if applicable): [facility]   Treatment Diagnosis:     ICD-10-CM    1. Difficulty walking  R26.2       2. Decreased functional mobility  R26.89       3. Decreased functional activity tolerance  R68.89       4. Functional weakness  R53.1       5. Decreased ability to perform activities  R68.89       6. Need for home exercise program  Z78.9       7. Decreased exercise tolerance  R68.89          Medical Diagnosis:  Tendinitis of right hip flexor [M76.891]  Muscle spasm [M62.838]   Referring Physician: Henry Dailey DO  PCP: Henry Dailey DO     Plan of care signed (Y/N): rouoted    Date of Patient follow up with Physician:      Plan of Care Report: EVAL today  POC update due: (10 visits /OR AUTH LIMITS, whichever is less)  2025                                             Medical History:  Medical History    Diagnosis Date Comment Source   HTN (hypertension)         Other Medical History    Diagnosis Date Comment Source   Acquired lymphedema of lower extremity      Esophageal reflux      Flushing      Idiopathic chronic venous hypertension of left lower extremity with inflammation      Labyrinthitis, unspecified      Lumbar disc disease  traumatic fall    Migraine      Pain in limb  left leg    Screening mammogram 2008 (Both)Negative                                                Precautions/ Contra-indications:           Latex allergy:  NO  Pacemaker:

## 2025-07-29 ENCOUNTER — HOSPITAL ENCOUNTER (OUTPATIENT)
Dept: PHYSICAL THERAPY | Age: 75
Setting detail: THERAPIES SERIES
Discharge: HOME | End: 2025-07-29
Attending: INTERNAL MEDICINE
Payer: MEDICARE

## 2025-07-29 PROCEDURE — 97140 MANUAL THERAPY 1/> REGIONS: CPT | Performed by: PHYSICAL THERAPIST

## 2025-07-29 PROCEDURE — 97110 THERAPEUTIC EXERCISES: CPT | Performed by: PHYSICAL THERAPIST

## 2025-07-29 NOTE — FLOWSHEET NOTE
N/A      Exercises/Interventions     Therapeutic Ex (97162)  resistance Notes/Cues/Progressions   HEP Initiated 6/17/25    Nu step  L3 x 6 min    Sliding board - heel slide , X3min emphasis on knee/ hip  ext Assist for ext   Saq/laq          bridges    Ppt with ball X 30    clams    Reverse clams    Standing knee flexion X 20     Standing up straight     Table hip ext  Yellow X 2 min    Standing hip abd Yellow x 20     Standing hip ext  Yellow X 20 ea    Side step X 2 min    Monster walk           Wall slide     Seated thoracic ext X 20 with towel roll     Gait with cane in left hand     Heel raises X 20     Reverse lunge     Left step thru for right hip ext     CC rows 20# x 30               Manual Intervention (67437)     Gentle hip distraction withi pillow under knee, hip rotation prom , hip flex stretch partially off table  14                        NMR re-education (71427) resistance CUES NEEDED                            Therapeutic Activity (54579)     Discussed mechanism of injury, anatomy, physiology, biomechanics, sleeping positions,  and strategies to accelerate the healing process. Answered all of patients questions regarding injury.  Gave necessary information to get any equipment needed.                             Modalities:    No modalities applied this session    Education/Home Exercise Program:   Access Code: [email or web address]  URL: [email or web address]  Date: 06/17/2025  Prepared by: Ramon Dunlap    Exercises  - Supine Bridge  - 1 x daily - 7 x weekly - 3 sets - 10 reps  - Clamshell  - 1 x daily - 7 x weekly - 3 sets - 10 reps  - hip extension  - 1 x daily - 7 x weekly - 3 sets - 10 reps  - Standing Hip Abduction with Counter Support  - 1 x daily - 7 x weekly - 3 sets - 10 reps  - Standing Knee Flexion with Counter Support  - 1 x daily - 7 x weekly - 3 sets - 10 reps    ASSESSMENT   Assessment:   Aide Tobias is a 75 y.o. female presenting today to Outpatient PT with signs and symptoms

## 2025-07-31 ENCOUNTER — HOSPITAL ENCOUNTER (OUTPATIENT)
Dept: PHYSICAL THERAPY | Age: 75
Setting detail: THERAPIES SERIES
Discharge: HOME | End: 2025-07-31
Attending: INTERNAL MEDICINE
Payer: MEDICARE

## 2025-07-31 PROCEDURE — 97110 THERAPEUTIC EXERCISES: CPT | Performed by: PHYSICAL THERAPIST

## 2025-07-31 PROCEDURE — 97140 MANUAL THERAPY 1/> REGIONS: CPT | Performed by: PHYSICAL THERAPIST

## 2025-07-31 NOTE — FLOWSHEET NOTE
she is starting to show some progress.     TREATMENT PLAN     Frequency/Duration: 1-2x/week for 8 weeks for the following treatment interventions:    Interventions:  Therapeutic Exercise (17576) including: strength training, ROM, and functional mobility  Therapeutic Activities (44380) including: functional mobility training and education.  Neuromuscular Re-education (95837) activation and proprioception, including postural re-education.    Gait Training (85280) for normalization of ambulation patterns and AD training.   Manual Therapy (52803) as indicated to include: Passive Range of Motion, Gr I-IV mobilizations, Soft Tissue Mobilization, Dry Needling/IASTM, Trigger Point Release, and Myofascial Release  Modalities as needed that may include: Cryotherapy  Patient education on joint protection, postural re-education, activity modification, and progression of HEP    Plan: POC initiated as per evaluationLE arom, prom  strength, proprioception, gait, balance, functional activities.  Mfr, joint mobs, mods as needed, hep. Progress as tolerated, Work on posture, gait, balance, hip flex release, hip jnt mobs   7/1/25  Work on hip flex stretches, standing hip ext, knee ext  7/3/25  Continue to try to get more hip/knee ext   7/8/25  Increase posterior chain exs  7/10/25  try tband row  7/15/25  increase wb exs  7/29/25  She had a slight setback.  Will increase wb exs next rx.   7/31/25  Try gait in bars with no device sliding hands       Electronically Signed by Usman Dunlap, PT  Date: 07/31/2025   Note: Portions of this note have been templated and/or copied from initial evaluation, reassessments and prior notes for documentation efficiency.  Note: If patient does not return for scheduled/recommended follow up visits, this note will serve as a discharge from care along with the most recent update on progress.    Ortho Evaluation

## 2025-08-06 ENCOUNTER — HOSPITAL ENCOUNTER (OUTPATIENT)
Dept: PHYSICAL THERAPY | Age: 75
Setting detail: THERAPIES SERIES
Discharge: HOME | End: 2025-08-06
Attending: INTERNAL MEDICINE
Payer: MEDICARE

## 2025-08-06 PROCEDURE — 97140 MANUAL THERAPY 1/> REGIONS: CPT | Performed by: PHYSICAL THERAPIST

## 2025-08-06 PROCEDURE — 97110 THERAPEUTIC EXERCISES: CPT | Performed by: PHYSICAL THERAPIST

## 2025-08-12 ENCOUNTER — HOSPITAL ENCOUNTER (OUTPATIENT)
Dept: PHYSICAL THERAPY | Age: 75
Setting detail: THERAPIES SERIES
Discharge: HOME | End: 2025-08-12
Attending: INTERNAL MEDICINE
Payer: MEDICARE

## 2025-08-12 PROCEDURE — 97140 MANUAL THERAPY 1/> REGIONS: CPT | Performed by: PHYSICAL THERAPIST

## 2025-08-12 PROCEDURE — 97110 THERAPEUTIC EXERCISES: CPT | Performed by: PHYSICAL THERAPIST

## 2025-08-14 ENCOUNTER — HOSPITAL ENCOUNTER (OUTPATIENT)
Dept: PHYSICAL THERAPY | Age: 75
Setting detail: THERAPIES SERIES
Discharge: HOME | End: 2025-08-14
Attending: INTERNAL MEDICINE
Payer: MEDICARE

## 2025-08-14 PROCEDURE — 97110 THERAPEUTIC EXERCISES: CPT | Performed by: PHYSICAL THERAPIST

## 2025-08-14 PROCEDURE — 97140 MANUAL THERAPY 1/> REGIONS: CPT | Performed by: PHYSICAL THERAPIST

## 2025-08-19 ENCOUNTER — HOSPITAL ENCOUNTER (OUTPATIENT)
Dept: PHYSICAL THERAPY | Age: 75
Setting detail: THERAPIES SERIES
Discharge: HOME | End: 2025-08-19
Attending: INTERNAL MEDICINE
Payer: MEDICARE

## 2025-08-19 ENCOUNTER — OFFICE VISIT (OUTPATIENT)
Dept: ORTHOPEDIC SURGERY | Age: 75
End: 2025-08-19
Payer: MEDICARE

## 2025-08-19 VITALS — BODY MASS INDEX: 37.44 KG/M2 | WEIGHT: 247 LBS | HEIGHT: 68 IN

## 2025-08-19 DIAGNOSIS — M25.551 RIGHT HIP PAIN: Primary | ICD-10-CM

## 2025-08-19 DIAGNOSIS — M16.11 ARTHRITIS OF RIGHT HIP: ICD-10-CM

## 2025-08-19 PROCEDURE — 1125F AMNT PAIN NOTED PAIN PRSNT: CPT | Performed by: PHYSICIAN ASSISTANT

## 2025-08-19 PROCEDURE — 1123F ACP DISCUSS/DSCN MKR DOCD: CPT | Performed by: PHYSICIAN ASSISTANT

## 2025-08-19 PROCEDURE — G8427 DOCREV CUR MEDS BY ELIG CLIN: HCPCS | Performed by: PHYSICIAN ASSISTANT

## 2025-08-19 PROCEDURE — 97140 MANUAL THERAPY 1/> REGIONS: CPT | Performed by: PHYSICAL THERAPIST

## 2025-08-19 PROCEDURE — 99214 OFFICE O/P EST MOD 30 MIN: CPT | Performed by: PHYSICIAN ASSISTANT

## 2025-08-19 PROCEDURE — G8417 CALC BMI ABV UP PARAM F/U: HCPCS | Performed by: PHYSICIAN ASSISTANT

## 2025-08-19 PROCEDURE — 97110 THERAPEUTIC EXERCISES: CPT | Performed by: PHYSICAL THERAPIST

## 2025-08-19 PROCEDURE — 3017F COLORECTAL CA SCREEN DOC REV: CPT | Performed by: PHYSICIAN ASSISTANT

## 2025-08-19 PROCEDURE — 1159F MED LIST DOCD IN RCRD: CPT | Performed by: PHYSICIAN ASSISTANT

## 2025-08-19 PROCEDURE — 1036F TOBACCO NON-USER: CPT | Performed by: PHYSICIAN ASSISTANT

## 2025-08-19 PROCEDURE — 1090F PRES/ABSN URINE INCON ASSESS: CPT | Performed by: PHYSICIAN ASSISTANT

## 2025-08-19 PROCEDURE — G8400 PT W/DXA NO RESULTS DOC: HCPCS | Performed by: PHYSICIAN ASSISTANT

## 2025-08-21 ENCOUNTER — HOSPITAL ENCOUNTER (OUTPATIENT)
Dept: GENERAL RADIOLOGY | Age: 75
Discharge: HOME | End: 2025-08-21
Payer: MEDICARE

## 2025-08-21 ENCOUNTER — APPOINTMENT (OUTPATIENT)
Dept: PHYSICAL THERAPY | Age: 75
End: 2025-08-21
Attending: INTERNAL MEDICINE
Payer: MEDICARE

## 2025-08-21 ENCOUNTER — OFFICE VISIT (OUTPATIENT)
Dept: ORTHOPEDIC SURGERY | Age: 75
End: 2025-08-21

## 2025-08-21 DIAGNOSIS — M16.11 ARTHRITIS OF RIGHT HIP: Primary | ICD-10-CM

## 2025-08-21 DIAGNOSIS — M16.11 ARTHRITIS OF RIGHT HIP: ICD-10-CM

## 2025-08-21 DIAGNOSIS — R52 PAIN: ICD-10-CM

## 2025-08-21 PROCEDURE — 77002 NEEDLE LOCALIZATION BY XRAY: CPT

## 2025-08-21 PROCEDURE — 20610 DRAIN/INJ JOINT/BURSA W/O US: CPT

## 2025-08-21 PROCEDURE — 6360000002 HC RX W HCPCS

## 2025-08-27 ENCOUNTER — APPOINTMENT (OUTPATIENT)
Dept: PHYSICAL THERAPY | Age: 75
End: 2025-08-27
Attending: INTERNAL MEDICINE
Payer: MEDICARE

## 2025-08-29 ENCOUNTER — HOSPITAL ENCOUNTER (OUTPATIENT)
Dept: PHYSICAL THERAPY | Age: 75
Setting detail: THERAPIES SERIES
Discharge: HOME | End: 2025-08-29
Attending: INTERNAL MEDICINE
Payer: MEDICARE

## 2025-08-29 PROCEDURE — 97110 THERAPEUTIC EXERCISES: CPT | Performed by: PHYSICAL THERAPIST

## 2025-09-03 ENCOUNTER — HOSPITAL ENCOUNTER (OUTPATIENT)
Dept: PHYSICAL THERAPY | Age: 75
Setting detail: THERAPIES SERIES
Discharge: HOME | End: 2025-09-03
Attending: INTERNAL MEDICINE
Payer: MEDICARE

## 2025-09-03 PROBLEM — M17.11 DEGENERATIVE ARTHRITIS OF RIGHT KNEE: Status: RESOLVED | Noted: 2020-01-31 | Resolved: 2025-09-03

## 2025-09-03 PROCEDURE — 97140 MANUAL THERAPY 1/> REGIONS: CPT | Performed by: PHYSICAL THERAPIST

## 2025-09-03 PROCEDURE — 97110 THERAPEUTIC EXERCISES: CPT | Performed by: PHYSICAL THERAPIST
